# Patient Record
Sex: FEMALE | Race: BLACK OR AFRICAN AMERICAN | NOT HISPANIC OR LATINO | Employment: OTHER | URBAN - METROPOLITAN AREA
[De-identification: names, ages, dates, MRNs, and addresses within clinical notes are randomized per-mention and may not be internally consistent; named-entity substitution may affect disease eponyms.]

---

## 2017-03-25 ENCOUNTER — HOSPITAL ENCOUNTER (INPATIENT)
Facility: HOSPITAL | Age: 47
LOS: 3 days | Discharge: HOME | DRG: 191 | End: 2017-03-29
Attending: EMERGENCY MEDICINE | Admitting: INTERNAL MEDICINE
Payer: MEDICARE

## 2017-03-25 DIAGNOSIS — I10 ESSENTIAL (PRIMARY) HYPERTENSION: ICD-10-CM

## 2017-03-25 DIAGNOSIS — J45.901 ASTHMA EXACERBATION: ICD-10-CM

## 2017-03-25 DIAGNOSIS — R50.9 FEVER: Primary | ICD-10-CM

## 2017-03-25 DIAGNOSIS — J44.1 COPD EXACERBATION: ICD-10-CM

## 2017-03-25 DIAGNOSIS — J44.1 CHRONIC OBSTRUCTIVE PULMONARY DISEASE WITH ACUTE EXACERBATION: ICD-10-CM

## 2017-03-25 DIAGNOSIS — D86.9 SARCOIDOSIS: ICD-10-CM

## 2017-03-25 PROCEDURE — 96361 HYDRATE IV INFUSION ADD-ON: CPT

## 2017-03-25 PROCEDURE — 96374 THER/PROPH/DIAG INJ IV PUSH: CPT

## 2017-03-25 PROCEDURE — 99285 EMERGENCY DEPT VISIT HI MDM: CPT | Mod: 25

## 2017-03-25 NOTE — IP AVS SNAPSHOT
[facility]  [id number] [place] [address] Dr Deepak [place] [address]           Patient Discharge Instructions   Our goal is to set you up for success. This packet includes information on your condition, medications, and your home care.  It will help you care for yourself to prevent having to return to the hospital.     Please ask your nurse if you have any questions.      There are many details to remember when preparing to leave the hospital. Here is what you will need to do:    1. Take your medicine. If you are prescribed medications, review your Medication List on the following pages. You may have new medications to  at the pharmacy and others that you'll need to stop taking. Review the instructions for how and when to take your medications. Talk with your doctor or nurses if you are unsure of what to do.     2. Go to your follow-up appointments. Specific follow-up information is listed in the following pages. Your may be contacted by a nurse or clinical provider about future appointments. Be sure we have all of the phone numbers to reach you. Please contact your provider's office if you are unable to make an appointment.     3. Watch for warning signs. Your doctor or nurse will give you detailed warning signs to watch for and when to call for assistance. These instructions may also include educational information about your condition. If you experience any of warning signs to your health, call your doctor.               ** Verify the list of medication(s) below is accurate and up to date. Carry this with you in case of emergency. If your medications have changed, please notify your healthcare provider.             Medication List      START taking these medications        Additional Info                      albuterol-ipratropium 2.5mg-0.5mg/3mL 0.5 mg-3 mg(2.5 mg base)/3 mL nebulizer solution   Commonly known as:  DUO-NEB   Quantity:  24 vial   Refills:  1   Dose:  3 mL    Last time this was  given:  3 mLs on 3/29/2017  7:15 AM   Instructions:  Take 3 mLs by nebulization every 4 (four) hours as needed for Wheezing. Rescue     Begin Date    AM    Noon    PM    Bedtime       alprazolam 0.25 MG tablet   Commonly known as:  XANAX   Quantity:  90 tablet   Refills:  0   Dose:  0.25 mg    Last time this was given:  0.25 mg on 3/29/2017 10:46 AM   Instructions:  Take 1 tablet (0.25 mg total) by mouth daily as needed for Anxiety.     Begin Date    AM    Noon    PM    Bedtime       cetirizine 10 MG tablet   Commonly known as:  ZYRTEC   Quantity:  30 tablet   Refills:  1   Dose:  10 mg    Last time this was given:  10 mg on 3/29/2017 10:46 AM   Instructions:  Take 1 tablet (10 mg total) by mouth once daily.     Begin Date    AM    Noon    PM    Bedtime       predniSONE 10 MG tablet   Commonly known as:  DELTASONE   Quantity:  30 tablet   Refills:  0    Last time this was given:  40 mg on 3/29/2017  8:10 AM   Instructions:  40mg po daily x 4 days, then 30mg PO daily x 3 days, 20mg po daily x 2 daily, 10mg po daily x 1 days     Begin Date    AM    Noon    PM    Bedtime         CONTINUE taking these medications        Additional Info                      prednisoLONE acetate 1 % Drps   Commonly known as:  PRED FORTE   Refills:  0   Dose:  1 drop    Instructions:  1 drop 4 (four) times daily.     Begin Date    AM    Noon    PM    Bedtime            Where to Get Your Medications      You can get these medications from any pharmacy     Bring a paper prescription for each of these medications     albuterol-ipratropium 2.5mg-0.5mg/3mL 0.5 mg-3 mg(2.5 mg base)/3 mL nebulizer solution    alprazolam 0.25 MG tablet    cetirizine 10 MG tablet    predniSONE 10 MG tablet                  Please bring to all follow up appointments:    1. A copy of your discharge instructions.  2. All medicines you are currently taking in their original bottles.  3. Identification and insurance card.    Please arrive 15 minutes ahead of scheduled  "appointment time.    Please call 24 hours in advance if you must reschedule your appointment and/or time.        Your Scheduled Appointments     Apr 04, 2017  8:00 AM CDT   Consult with Roldan Kaplan MD   [facility]    [address] [facility]  [place] [address] [phone]              Follow-up Information     Follow up with Roldan Kaplan MD On 4/4/2017.    Specialty:  Pulmonary Disease    Contact information:    [address]  [facility] [address] [phone]          Discharge Instructions     Future Orders    Activity as tolerated     Diet general     Questions:    Total calories:      Fat restriction, if any:      Protein restriction, if any:      Na restriction, if any:      Fluid restriction:      Additional restrictions:      NEBULIZER FOR HOME USE     Questions:    Height:  5' 3" (1.6 m)    Weight:  114.8 kg (253 lb)    Does patient have medical equipment at home?:  cane, straight Comment - Cane designed for assistance of the visually impaired.     Length of need (1-99 months):  99      Discharge References/Attachments     ALPRAZOLAM TABLETS (ENGLISH)    PREDNISONE TABLETS (ENGLISH)    CETIRIZINE TABLETS (ENGLISH)        Primary Diagnosis     Your primary diagnosis was:  Asthma Flare      Admission Information     Date & Time Provider Department [facility]    3/25/2017 11:47 PM Curry Lewis MD [facility] [id number]      Care Providers     Provider Role Specialty Primary office phone    Curry Lewis MD Attending Provider Internal Medicine [phone]    Curry Lewis MD Team Attending  Internal Medicine [phone]      Your Vitals Were     BP Pulse Temp Resp Height Weight    152/85 (BP Location: Left arm, Patient Position: Lying, BP Method: Automatic) 75 98.7 °F (37.1 °C) (Oral) 18 5' 3" (1.6 m) 114.8 kg (253 lb)    SpO2 BMI             94% 44.82 kg/m2         Recent Lab Values     No lab values to display.      Allergies as of " 3/29/2017     No Known Allergies      [facility]     [facility] Nurse Care Line - 24/7 Assistance  Unless otherwise directed by your provider, please contact [facility], our nurse care line that is available for 24/7 assistance.     Registered nurses in the [facility] provide clinical advisement, health education, appointment booking, and other advisory services.  Call for this free service at [phone].        Advance Directives     An advance directive is a document which, in the event you are no longer able to make decisions for yourself, tells your healthcare team what kind of treatment you do or do not want to receive, or who you would like to make those decisions for you.  If you do not currently have an advance directive, [facility] encourages you to create one.  For more information call:  [phone]), [phone] ([phone]),  or log on to [email or web address].        Language Assistance Services     ATTENTION: Language assistance services are available, free of charge. Please call [phone].      ATENCIÓN: Si habla español, tiene a smith disposición servicios gratuitos de asistencia lingüística. Llame al [phone].     CHÚ Ý: N?u b?n nói Ti?ng Vi?t, có các d?ch v? h? tr? ngôn ng? mi?n phí dành cho b?n. G?i s? [phone].        [facility] Sign-Up     Activating your [facility] account is as easy as 1-2-3!     1) Visit [email or web address], select Sign Up Now, enter this activation code and your date of birth, then select Next.  [email or web address]  Expires: 5/13/2017  1:13 PM      2) Create a username and password to use when you visit [facility] in the future and select a security question in case you lose your password and select Next.    3) Enter your e-mail address and click Sign Up!    Additional Information  If you have questions, please e-mail [email or web address] or call [phone] to talk to our [facility] staff. Remember, [facility] is NOT to  be used for urgent needs. For medical emergencies, dial 911.          [facility] complies with applicable Federal civil rights laws and does not discriminate on the basis of race, color, national origin, age, disability, or sex.

## 2017-03-26 PROBLEM — J45.901 ASTHMA EXACERBATION: Status: ACTIVE | Noted: 2017-03-26

## 2017-03-26 PROBLEM — E66.01 MORBID OBESITY: Status: ACTIVE | Noted: 2017-03-26

## 2017-03-26 PROBLEM — J44.1 COPD EXACERBATION: Status: ACTIVE | Noted: 2017-03-26

## 2017-03-26 PROBLEM — D86.9 SARCOIDOSIS: Status: ACTIVE | Noted: 2017-03-26

## 2017-03-26 LAB
ALBUMIN SERPL BCP-MCNC: 3.6 G/DL
ALLENS TEST: ABNORMAL
ALP SERPL-CCNC: 64 U/L
ALT SERPL W/O P-5'-P-CCNC: 22 U/L
ANION GAP SERPL CALC-SCNC: 10 MMOL/L
AST SERPL-CCNC: 26 U/L
BASOPHILS # BLD AUTO: 0.02 K/UL
BASOPHILS NFR BLD: 0.4 %
BILIRUB SERPL-MCNC: 0.5 MG/DL
BUN SERPL-MCNC: 5 MG/DL
CALCIUM SERPL-MCNC: 9.3 MG/DL
CHLORIDE SERPL-SCNC: 105 MMOL/L
CO2 SERPL-SCNC: 26 MMOL/L
CREAT SERPL-MCNC: 0.9 MG/DL
DELSYS: ABNORMAL
DIASTOLIC DYSFUNCTION: YES
DIFFERENTIAL METHOD: ABNORMAL
EOSINOPHIL # BLD AUTO: 0.4 K/UL
EOSINOPHIL NFR BLD: 6.4 %
ERYTHROCYTE [DISTWIDTH] IN BLOOD BY AUTOMATED COUNT: 15.3 %
EST. GFR  (AFRICAN AMERICAN): >60 ML/MIN/1.73 M^2
EST. GFR  (NON AFRICAN AMERICAN): >60 ML/MIN/1.73 M^2
ESTIMATED PA SYSTOLIC PRESSURE: 24.34
FIO2: 21
FLUAV AG SPEC QL IA: NEGATIVE
FLUBV AG SPEC QL IA: NEGATIVE
GLUCOSE SERPL-MCNC: 99 MG/DL
HCO3 UR-SCNC: 24.5 MMOL/L (ref 24–28)
HCT VFR BLD AUTO: 33.9 %
HGB BLD-MCNC: 11.3 G/DL
LYMPHOCYTES # BLD AUTO: 1.1 K/UL
LYMPHOCYTES NFR BLD: 19.9 %
MCH RBC QN AUTO: 28.4 PG
MCHC RBC AUTO-ENTMCNC: 33.3 %
MCV RBC AUTO: 85 FL
MODE: ABNORMAL
MONOCYTES # BLD AUTO: 0.4 K/UL
MONOCYTES NFR BLD: 8.1 %
NEUTROPHILS # BLD AUTO: 3.6 K/UL
NEUTROPHILS NFR BLD: 65.4 %
PCO2 BLDA: 37.3 MMHG (ref 35–45)
PH SMN: 7.42 [PH] (ref 7.35–7.45)
PLATELET # BLD AUTO: 108 K/UL
PLATELET BLD QL SMEAR: ABNORMAL
PMV BLD AUTO: ABNORMAL FL
PO2 BLDA: 78 MMHG (ref 80–100)
POC BE: 0 MMOL/L
POC SATURATED O2: 96 % (ref 95–100)
POTASSIUM SERPL-SCNC: 4 MMOL/L
PROT SERPL-MCNC: 7.3 G/DL
RBC # BLD AUTO: 3.98 M/UL
RETIRED EF AND QEF - SEE NOTES: 65 (ref 55–65)
SAMPLE: ABNORMAL
SITE: ABNORMAL
SODIUM SERPL-SCNC: 141 MMOL/L
SPECIMEN SOURCE: NORMAL
WBC # BLD AUTO: 5.44 K/UL

## 2017-03-26 PROCEDURE — 25000242 PHARM REV CODE 250 ALT 637 W/ HCPCS: Performed by: INTERNAL MEDICINE

## 2017-03-26 PROCEDURE — 63600175 PHARM REV CODE 636 W HCPCS: Performed by: EMERGENCY MEDICINE

## 2017-03-26 PROCEDURE — 93306 TTE W/DOPPLER COMPLETE: CPT

## 2017-03-26 PROCEDURE — 25000003 PHARM REV CODE 250: Performed by: INTERNAL MEDICINE

## 2017-03-26 PROCEDURE — 80053 COMPREHEN METABOLIC PANEL: CPT

## 2017-03-26 PROCEDURE — 94640 AIRWAY INHALATION TREATMENT: CPT

## 2017-03-26 PROCEDURE — 85025 COMPLETE CBC W/AUTO DIFF WBC: CPT

## 2017-03-26 PROCEDURE — 93306 TTE W/DOPPLER COMPLETE: CPT | Mod: 26,,, | Performed by: INTERNAL MEDICINE

## 2017-03-26 PROCEDURE — 82803 BLOOD GASES ANY COMBINATION: CPT

## 2017-03-26 PROCEDURE — 99900035 HC TECH TIME PER 15 MIN (STAT)

## 2017-03-26 PROCEDURE — 36600 WITHDRAWAL OF ARTERIAL BLOOD: CPT

## 2017-03-26 PROCEDURE — 25000242 PHARM REV CODE 250 ALT 637 W/ HCPCS: Performed by: EMERGENCY MEDICINE

## 2017-03-26 PROCEDURE — 96372 THER/PROPH/DIAG INJ SC/IM: CPT

## 2017-03-26 PROCEDURE — 63600175 PHARM REV CODE 636 W HCPCS: Performed by: INTERNAL MEDICINE

## 2017-03-26 PROCEDURE — 21400001 HC TELEMETRY ROOM

## 2017-03-26 PROCEDURE — 87400 INFLUENZA A/B EACH AG IA: CPT

## 2017-03-26 PROCEDURE — 25000003 PHARM REV CODE 250: Performed by: EMERGENCY MEDICINE

## 2017-03-26 RX ORDER — IPRATROPIUM BROMIDE AND ALBUTEROL SULFATE 2.5; .5 MG/3ML; MG/3ML
3 SOLUTION RESPIRATORY (INHALATION) EVERY 4 HOURS PRN
Status: DISCONTINUED | OUTPATIENT
Start: 2017-03-26 | End: 2017-03-26

## 2017-03-26 RX ORDER — ENOXAPARIN SODIUM 100 MG/ML
40 INJECTION SUBCUTANEOUS EVERY 24 HOURS
Status: DISCONTINUED | OUTPATIENT
Start: 2017-03-26 | End: 2017-03-29 | Stop reason: HOSPADM

## 2017-03-26 RX ORDER — FAMOTIDINE 20 MG/1
20 TABLET, FILM COATED ORAL 2 TIMES DAILY
Status: DISCONTINUED | OUTPATIENT
Start: 2017-03-26 | End: 2017-03-29 | Stop reason: HOSPADM

## 2017-03-26 RX ORDER — OXYCODONE AND ACETAMINOPHEN 7.5; 325 MG/1; MG/1
1 TABLET ORAL EVERY 4 HOURS PRN
Status: DISCONTINUED | OUTPATIENT
Start: 2017-03-26 | End: 2017-03-29 | Stop reason: HOSPADM

## 2017-03-26 RX ORDER — IPRATROPIUM BROMIDE AND ALBUTEROL SULFATE 2.5; .5 MG/3ML; MG/3ML
3 SOLUTION RESPIRATORY (INHALATION) EVERY 6 HOURS
Status: COMPLETED | OUTPATIENT
Start: 2017-03-26 | End: 2017-03-28

## 2017-03-26 RX ORDER — DIPHENHYDRAMINE HCL 25 MG
25 CAPSULE ORAL EVERY 6 HOURS PRN
Status: DISCONTINUED | OUTPATIENT
Start: 2017-03-26 | End: 2017-03-29 | Stop reason: HOSPADM

## 2017-03-26 RX ORDER — HYDRALAZINE HYDROCHLORIDE 20 MG/ML
10 INJECTION INTRAMUSCULAR; INTRAVENOUS EVERY 6 HOURS PRN
Status: DISCONTINUED | OUTPATIENT
Start: 2017-03-26 | End: 2017-03-29 | Stop reason: HOSPADM

## 2017-03-26 RX ORDER — ACETAMINOPHEN 325 MG/1
650 TABLET ORAL EVERY 6 HOURS PRN
Status: DISCONTINUED | OUTPATIENT
Start: 2017-03-26 | End: 2017-03-29 | Stop reason: HOSPADM

## 2017-03-26 RX ORDER — GUAIFENESIN 100 MG/5ML
200 SOLUTION ORAL EVERY 4 HOURS PRN
Status: DISCONTINUED | OUTPATIENT
Start: 2017-03-26 | End: 2017-03-29 | Stop reason: HOSPADM

## 2017-03-26 RX ORDER — MOXIFLOXACIN HYDROCHLORIDE 400 MG/1
400 TABLET ORAL DAILY
Status: DISCONTINUED | OUTPATIENT
Start: 2017-03-26 | End: 2017-03-29 | Stop reason: HOSPADM

## 2017-03-26 RX ORDER — ONDANSETRON 2 MG/ML
4 INJECTION INTRAMUSCULAR; INTRAVENOUS EVERY 8 HOURS PRN
Status: DISCONTINUED | OUTPATIENT
Start: 2017-03-26 | End: 2017-03-29 | Stop reason: HOSPADM

## 2017-03-26 RX ORDER — PREDNISONE 20 MG/1
40 TABLET ORAL DAILY
Status: DISCONTINUED | OUTPATIENT
Start: 2017-03-26 | End: 2017-03-27

## 2017-03-26 RX ORDER — METHYLPREDNISOLONE SOD SUCC 125 MG
VIAL (EA) INJECTION
Status: DISCONTINUED
Start: 2017-03-26 | End: 2017-03-26 | Stop reason: WASHOUT

## 2017-03-26 RX ORDER — FUROSEMIDE 40 MG/1
40 TABLET ORAL ONCE
Status: COMPLETED | OUTPATIENT
Start: 2017-03-26 | End: 2017-03-26

## 2017-03-26 RX ORDER — IPRATROPIUM BROMIDE AND ALBUTEROL SULFATE 2.5; .5 MG/3ML; MG/3ML
3 SOLUTION RESPIRATORY (INHALATION)
Status: COMPLETED | OUTPATIENT
Start: 2017-03-26 | End: 2017-03-26

## 2017-03-26 RX ORDER — PREDNISOLONE ACETATE 10 MG/ML
1 SUSPENSION/ DROPS OPHTHALMIC 4 TIMES DAILY
COMMUNITY
End: 2017-07-03 | Stop reason: SDUPTHER

## 2017-03-26 RX ORDER — IPRATROPIUM BROMIDE AND ALBUTEROL SULFATE 2.5; .5 MG/3ML; MG/3ML
3 SOLUTION RESPIRATORY (INHALATION) EVERY 6 HOURS
Status: DISCONTINUED | OUTPATIENT
Start: 2017-03-26 | End: 2017-03-26

## 2017-03-26 RX ADMIN — IPRATROPIUM BROMIDE AND ALBUTEROL SULFATE 3 ML: .5; 3 SOLUTION RESPIRATORY (INHALATION) at 12:03

## 2017-03-26 RX ADMIN — FUROSEMIDE 40 MG: 40 TABLET ORAL at 05:03

## 2017-03-26 RX ADMIN — FAMOTIDINE 20 MG: 20 TABLET ORAL at 09:03

## 2017-03-26 RX ADMIN — SODIUM CHLORIDE 1000 ML: 0.9 INJECTION, SOLUTION INTRAVENOUS at 01:03

## 2017-03-26 RX ADMIN — METHYLPREDNISOLONE SODIUM SUCCINATE 125 MG: 125 INJECTION, POWDER, FOR SOLUTION INTRAMUSCULAR; INTRAVENOUS at 04:03

## 2017-03-26 RX ADMIN — IPRATROPIUM BROMIDE AND ALBUTEROL SULFATE 3 ML: .5; 3 SOLUTION RESPIRATORY (INHALATION) at 07:03

## 2017-03-26 RX ADMIN — PREDNISONE 40 MG: 20 TABLET ORAL at 09:03

## 2017-03-26 RX ADMIN — MOXIFLOXACIN HYDROCHLORIDE 400 MG: 400 TABLET, FILM COATED ORAL at 05:03

## 2017-03-26 RX ADMIN — IPRATROPIUM BROMIDE AND ALBUTEROL SULFATE 3 ML: .5; 3 SOLUTION RESPIRATORY (INHALATION) at 01:03

## 2017-03-26 RX ADMIN — ENOXAPARIN SODIUM 40 MG: 100 INJECTION SUBCUTANEOUS at 05:03

## 2017-03-26 RX ADMIN — IPRATROPIUM BROMIDE AND ALBUTEROL SULFATE 3 ML: .5; 3 SOLUTION RESPIRATORY (INHALATION) at 06:03

## 2017-03-26 NOTE — ED NOTES
Pt resting in bed. No acute distress, RR equal and non labored, VSS.  Breathing has improved. Bed in low, locked, position and call light is within reach. Side rails up X 2. Will continue to monitor.

## 2017-03-26 NOTE — PLAN OF CARE
Problem: Patient Care Overview  Goal: Plan of Care Review  Outcome: Ongoing (interventions implemented as appropriate)  Patient arrived early am from er.  Plan of care reviewed with patient, reviewed rounding and fall precautions with patient.  Patient verbalized understanding. Bed alarm activiated, call light given to patient. No further needs at this time

## 2017-03-26 NOTE — ED PROVIDER NOTES
SCRIBE #1 NOTE: I, Kim Rouse, am scribing for, and in the presence of, Otf Nguyen MD. I have scribed the entire note.      History      Chief Complaint   Patient presents with    Cough     URI ssx x 3 days    Fever       Review of patient's allergies indicates:  No Known Allergies     HPI   HPI    3/26/2017, 12:50 AM   History obtained from the patient      History of Present Illness: Jennifer Mcclain is a 46 y.o. female patient who presents to the Emergency Department for cough which onset gradually 3 days ago. Pt also c/o SOB noting a history of COPD and sarcoidosis.  Symptoms are constant and moderate in severity. No mitigating or exacerbating factors reported. Associated sxs include myalgias, chills, fever, and sinus prerssure. Patient denies any ear pressure, ear pain, CP, diaphoresis, weakness, leg swelling, palpitations, HA, jaw pain, recent travel, long car rides, nausea, vomiting, abdominal pain, and all other sxs at this time. No further complaints or concerns at this time.         Arrival mode: Personal vehicle    PCP: Primary Doctor No       Past Medical History:  Past medical history reviewed not relevant      Past Surgical History:  Past surgical history reviewed not relevant      Family History:  Family history reviewed not relevant      Social History:  Social History    Social History Main Topics    Social History Main Topics    Smoking status: Unknown if ever smoked    Smokeless tobacco: Unknown if ever used    Alcohol Use: Unknown drinking history    Drug Use: Unknown if ever used    Sexual Activity: Unknown       ROS   Review of Systems   Constitutional: Positive for chills and fever. Negative for diaphoresis.   HENT: Positive for congestion and sinus pressure. Negative for ear discharge, ear pain and sore throat.    Respiratory: Positive for cough, shortness of breath and wheezing. Negative for chest tightness.    Cardiovascular: Negative for chest pain, palpitations and leg  "swelling.   Gastrointestinal: Negative for abdominal pain, diarrhea, nausea and vomiting.   Genitourinary: Negative for dysuria.   Musculoskeletal: Positive for myalgias. Negative for back pain.   Skin: Negative for rash.   Neurological: Negative for dizziness, weakness, light-headedness and headaches.   Hematological: Does not bruise/bleed easily.       Physical Exam    Initial Vitals   BP Pulse Resp Temp SpO2   03/25/17 2346 03/25/17 2346 03/25/17 2346 03/25/17 2346 03/25/17 2346   153/67 111 20 100.3 °F (37.9 °C) 95 %      Physical Exam  Nursing Notes and Vital Signs Reviewed.  Constitutional: Patient is in mild distress. Awake and alert. Well-developed and well-nourished.  Head: Atraumatic. Normocephalic.  Eyes: PERRL. EOM intact. Conjunctivae are not pale. No scleral icterus.  ENT: Mucous membranes are moist. Oropharynx is clear and symmetric.    Neck: Supple. Full ROM. No lymphadenopathy.  Cardiovascular: Regular rate. Regular rhythm. No murmurs, rubs, or gallops. Distal pulses are 2+ and symmetric.  Pulmonary/Chest: Mild-moderate dyspnea. Tachypnea. Accessory muscle use noted. Diffuse wheezing bilaterally.  Abdominal: Soft and non-distended.  There is no tenderness.  No rebound, guarding, or rigidity. Good bowel sounds.  Genitourinary: No CVA tenderness  Musculoskeletal: Moves all extremities. No obvious deformities. No edema. No calf tenderness.  Skin: Warm and dry.  Neurological:  Alert, awake, and appropriate.  Normal speech.  No acute focal neurological deficits are appreciated.  Psychiatric: Normal affect. Good eye contact. Appropriate in content.    ED Course    Procedures  ED Vital Signs:  Vitals:    03/25/17 2346 03/26/17 0044 03/26/17 0049 03/26/17 0110   BP: (!) 153/67      Pulse: (!) 111 101 102 106   Resp: 20 (!) 22 20 20   Temp: 100.3 °F (37.9 °C)      TempSrc: Oral      SpO2: 95% 95% 96% 96%   Weight: 116.1 kg (256 lb)      Height: 5' 3" (1.6 m)       03/26/17 0202 03/26/17 0206   BP: (!) 118/50 "    Pulse: 107 107   Resp: (!) 21 19   Temp: 99.5 °F (37.5 °C)    TempSrc: Oral    SpO2: 100% 100%   Weight:     Height:         Abnormal Lab Results:  Labs Reviewed   CBC W/ AUTO DIFFERENTIAL - Abnormal; Notable for the following:        Result Value    RBC 3.98 (*)     Hemoglobin 11.3 (*)     Hematocrit 33.9 (*)     RDW 15.3 (*)     Platelets 108 (*)     Platelet Estimate Clumped (*)     All other components within normal limits   COMPREHENSIVE METABOLIC PANEL - Abnormal; Notable for the following:     BUN, Bld 5 (*)     All other components within normal limits   INFLUENZA A AND B ANTIGEN        All Lab Results:  Results for orders placed or performed during the hospital encounter of 03/25/17   CBC auto differential   Result Value Ref Range    WBC 5.44 3.90 - 12.70 K/uL    RBC 3.98 (L) 4.00 - 5.40 M/uL    Hemoglobin 11.3 (L) 12.0 - 16.0 g/dL    Hematocrit 33.9 (L) 37.0 - 48.5 %    MCV 85 82 - 98 fL    MCH 28.4 27.0 - 31.0 pg    MCHC 33.3 32.0 - 36.0 %    RDW 15.3 (H) 11.5 - 14.5 %    Platelets 108 (L) 150 - 350 K/uL    MPV SEE COMMENT 9.2 - 12.9 fL    Gran # 3.6 1.8 - 7.7 K/uL    Lymph # 1.1 1.0 - 4.8 K/uL    Mono # 0.4 0.3 - 1.0 K/uL    Eos # 0.4 0.0 - 0.5 K/uL    Baso # 0.02 0.00 - 0.20 K/uL    Gran% 65.4 38.0 - 73.0 %    Lymph% 19.9 18.0 - 48.0 %    Mono% 8.1 4.0 - 15.0 %    Eosinophil% 6.4 0.0 - 8.0 %    Basophil% 0.4 0.0 - 1.9 %    Platelet Estimate Clumped (A)     Differential Method Automated    Comprehensive metabolic panel   Result Value Ref Range    Sodium 141 136 - 145 mmol/L    Potassium 4.0 3.5 - 5.1 mmol/L    Chloride 105 95 - 110 mmol/L    CO2 26 23 - 29 mmol/L    Glucose 99 70 - 110 mg/dL    BUN, Bld 5 (L) 6 - 20 mg/dL    Creatinine 0.9 0.5 - 1.4 mg/dL    Calcium 9.3 8.7 - 10.5 mg/dL    Total Protein 7.3 6.0 - 8.4 g/dL    Albumin 3.6 3.5 - 5.2 g/dL    Total Bilirubin 0.5 0.1 - 1.0 mg/dL    Alkaline Phosphatase 64 55 - 135 U/L    AST 26 10 - 40 U/L    ALT 22 10 - 44 U/L    Anion Gap 10 8 - 16 mmol/L     eGFR if African American >60 >60 mL/min/1.73 m^2    eGFR if non African American >60 >60 mL/min/1.73 m^2   Influenza antigen Nasopharyngeal Swab   Result Value Ref Range    Influenza A Ag, EIA Negative Negative    Influenza B Ag, EIA Negative Negative    Flu A & B Source Nasopharyngeal Swab          Imaging Results:  Imaging Results         X-Ray Chest PA And Lateral (In process)                   The Emergency Provider reviewed the vital signs and test results, which are outlined above.    ED Discussion   All historical, clinical, radiographic, and laboratory findings were reviewed with the patient/family in detail along with the indications for transport to the facility in [place] in order to receive further bronchodilator treatments and steroids pending improvement.  All remaining questions and concerns were addressed at this time and the patient/family agrees to proceed accordingly.  Similarly, all pertinent details of the encounter were discussed with Dr. Vanegas who agrees to accept the patient at [facility] for further care as outlined above.    Otf Nguyen MD      3:43 AM: Discussed case with Dr. Vanegas ([facility]) who agrees with current care and management of pt and accepts admission.   Admitting Service: [facility] medicine   Admitting Physician: Dr. Vanegas   Admit to: OBS-Tele    3:43 AM: Re-evaluated pt. I have discussed test results, shared treatment plan, and the need for admission with patient and family at bedside. Pt and family express understanding at this time and agree with all information. All questions answered. Pt and family have no further questions or concerns at this time. Pt is ready for admit.      ED Medication(s):  Medications   methylPREDNISolone sod suc(PF) 125 mg/2 mL injection 125 mg (not administered)   albuterol-ipratropium 2.5mg-0.5mg/3mL nebulizer solution 3 mL (3 mLs Nebulization Given 3/26/17 0110)   albuterol-ipratropium 2.5mg-0.5mg/3mL nebulizer  solution 3 mL (3 mLs Nebulization Given 3/26/17 0049)   albuterol-ipratropium 2.5mg-0.5mg/3mL nebulizer solution 3 mL (3 mLs Nebulization Given 3/26/17 0044)   sodium chloride 0.9% bolus 1,000 mL (0 mLs Intravenous Stopped 3/26/17 0211)       New Prescriptions    No medications on file             Medical Decision Making    Medical Decision Making:   Clinical Tests:   Lab Tests: Ordered and Reviewed  Radiological Study: Ordered and Reviewed  Medical Tests: Ordered and Reviewed           Scribe Attestation:   Scribe #1: I performed the above scribed service and the documentation accurately describes the services I performed. I attest to the accuracy of the note.    Attending:   Physician Attestation Statement for Scribe #1: I, Otf Nguyen MD, personally performed the services described in this documentation, as scribed by Kim Rouse, in my presence, and it is both accurate and complete.          Clinical Impression       ICD-10-CM ICD-9-CM   1. Fever R50.9 780.60   2. Sarcoidosis D86.9 135   3. Chronic obstructive pulmonary disease with acute exacerbation J44.1 491.21   4. COPD exacerbation J44.1 491.21       Disposition:   Disposition: Placed in Observation  Condition: Fair         Otf Nguyen MD  03/26/17 5034

## 2017-03-26 NOTE — ED NOTES
Two unsuccessful attempts were made to place an IV.  JEREMIAH Rollins at bedside to attempt IV access at this time.

## 2017-03-26 NOTE — H&P
[facility] Hospital Medicine  History & Physical    Patient Name: Jennifer Mcclain  MRN: [id number]  Admission Date: 3/25/2017  Attending Physician: Mitchell Vanegas MD  Primary Care Provider: Primary Doctor No         Patient information was obtained from patient and ER records.     Subjective:     Principal Problem:COPD exacerbation    Chief Complaint:   Chief Complaint   Patient presents with    Cough     URI ssx x 3 days    Fever        HPI: Ms. Mcclain is a 47 y/o AAF with h/o sarcoidosis followed by  (pulmonalogist), was taken off steroids one year ago, was in her usual state of health until 2 days, presented to the ED c/o worsening cough, and DENNSI without fever/chills. Reports mild wheezing. In the ED, oxygen saturations are high 90's, she received 3 rounds of nebulized treatments, with still being dyspneic and tachypneic. Se is admitted for mild acute COPD exacerbation. Patient has been a non-smoker all her life. CXR shows no evidence of infiltrates, but shows interstitial scarring.    Past Medical History:   Diagnosis Date    Asthma     Blindness     Sarcoidosis        History reviewed. No pertinent surgical history.    Review of patient's allergies indicates:  No Known Allergies    No current facility-administered medications on file prior to encounter.      No current outpatient prescriptions on file prior to encounter.     Family History     Problem Relation (Age of Onset)    Heart disease Father        Social History Main Topics    Smoking status: Never Smoker    Smokeless tobacco: Not on file    Alcohol use No    Drug use: No    Sexual activity: Not on file     Review of Systems   Constitutional: Negative.  Negative for chills, diaphoresis, fatigue and fever.   HENT: Negative.  Negative for congestion, nosebleeds and sinus pressure.    Eyes: Negative.  Negative for photophobia, redness and visual disturbance.   Respiratory: Positive for cough, shortness of breath and  wheezing. Negative for chest tightness.    Cardiovascular: Positive for leg swelling (chronic). Negative for chest pain and palpitations.   Gastrointestinal: Negative.  Negative for abdominal pain, diarrhea, nausea and vomiting.   Endocrine: Negative.  Negative for polydipsia, polyphagia and polyuria.   Genitourinary: Negative.  Negative for dysuria, flank pain, frequency and urgency.   Musculoskeletal: Negative.  Negative for back pain, joint swelling and neck stiffness.   Skin: Negative.  Negative for color change, pallor and rash.   Allergic/Immunologic: Negative.  Negative for environmental allergies, food allergies and immunocompromised state.   Neurological: Negative.  Negative for dizziness, syncope, speech difficulty, numbness and headaches.   Hematological: Negative.  Negative for adenopathy. Does not bruise/bleed easily.   Psychiatric/Behavioral: Negative.  Negative for confusion, decreased concentration and hallucinations. The patient is not nervous/anxious.    All other systems reviewed and are negative.    Objective:     Vital Signs (Most Recent):  Temp: 99.6 °F (37.6 °C) (03/26/17 0434)  Pulse: 102 (03/26/17 0434)  Resp: (!) 22 (03/26/17 0434)  BP: (!) 117/54 (03/26/17 0434)  SpO2: 98 % (03/26/17 0434) Vital Signs (24h Range):  Temp:  [99.5 °F (37.5 °C)-100.3 °F (37.9 °C)] 99.6 °F (37.6 °C)  Pulse:  [101-111] 102  Resp:  [19-22] 22  SpO2:  [95 %-100 %] 98 %  BP: (117-153)/(50-67) 117/54     Weight: 116.1 kg (256 lb)  Body mass index is 45.35 kg/(m^2).    Physical Exam   Constitutional: She is oriented to person, place, and time. She appears well-developed and well-nourished. No distress.   HENT:   Head: Normocephalic and atraumatic.   Blind in both eyes   Eyes: Conjunctivae and EOM are normal. No scleral icterus.   Neck: Normal range of motion. Neck supple. No JVD present. No tracheal deviation present. No thyromegaly present.   Cardiovascular: Normal rate, regular rhythm, normal heart sounds and  intact distal pulses.    No murmur heard.  Pulmonary/Chest: Effort normal. No respiratory distress. She has wheezes (mild). She has no rales. She exhibits no tenderness.   Abdominal: Soft. Bowel sounds are normal. She exhibits no distension. There is no tenderness.   Musculoskeletal: Normal range of motion. She exhibits edema. She exhibits no tenderness.   Lymphadenopathy:     She has no cervical adenopathy.   Neurological: She is alert and oriented to person, place, and time. No cranial nerve deficit. She exhibits normal muscle tone. Coordination normal.   Skin: Skin is warm and dry. She is not diaphoretic. No erythema.   Psychiatric: She has a normal mood and affect. Her behavior is normal. Thought content normal.   Nursing note and vitals reviewed.       Significant Labs:   ABGs:   Recent Labs  Lab 03/26/17  0357   PH 7.425   PCO2 37.3   HCO3 24.5   POCSATURATED 96   BE 0     BMP:   Recent Labs  Lab 03/26/17  0102   GLU 99      K 4.0      CO2 26   BUN 5*   CREATININE 0.9   CALCIUM 9.3     CBC:   Recent Labs  Lab 03/26/17 0102   WBC 5.44   HGB 11.3*   HCT 33.9*   *     CMP:   Recent Labs  Lab 03/26/17  0102      K 4.0      CO2 26   GLU 99   BUN 5*   CREATININE 0.9   CALCIUM 9.3   PROT 7.3   ALBUMIN 3.6   BILITOT 0.5   ALKPHOS 64   AST 26   ALT 22   ANIONGAP 10   EGFRNONAA >60     Cardiac Markers: No results for input(s): CKMB, MYOGLOBIN, BNP, TROPISTAT in the last 48 hours.  Respiratory Culture: No results for input(s): GSRESP, RESPIRATORYC in the last 48 hours.  Troponin: No results for input(s): TROPONINI in the last 48 hours.  Urine Studies: No results for input(s): COLORU, APPEARANCEUA, PHUR, SPECGRAV, PROTEINUA, GLUCUA, KETONESU, BILIRUBINUA, OCCULTUA, NITRITE, UROBILINOGEN, LEUKOCYTESUR, RBCUA, WBCUA, BACTERIA, SQUAMEPITHEL, HYALINECASTS in the last 48 hours.    Invalid input(s): WRIGHTSUR  All pertinent labs within the past 24 hours have been reviewed.    Significant Imaging:    Imaging Results         X-Ray Chest PA And Lateral (In process)             Assessment/Plan:     * COPD exacerbation  - Improved with one dose of IV solumedrol  - Contiue oral prednisone  - Duonebs q6 scheduled  - Empirically avelox      Sarcoidosis  - Defer to her pulmonologist as out-patient      Morbid obesity  - BMI 45.3      VTE Risk Mitigation     Lovenox        Mitchell Vanegas MD  Department of Hospital Medicine   [facility]

## 2017-03-26 NOTE — ED NOTES
Charge nurse on observation states she will call when they are ready to receive the patient due to high patient acuity on the floor at this time.    Pt is asleep in bed. No acute distress is noted, RR equal and non labored, VSS. Bed in low, locked, position and call light is within reach. Side rails up X 2.  Patient denies any further needs. Will continue to monitor.

## 2017-03-26 NOTE — PROGRESS NOTES
Patient transferred via w/c per Elaine DANIELS to inpatient medsur room 571, report called to Sugey DANIELS

## 2017-03-26 NOTE — PLAN OF CARE
Problem: Patient Care Overview  Goal: Plan of Care Review  Outcome: Ongoing (interventions implemented as appropriate)  Plan of care reviewed, updated and ongoing

## 2017-03-26 NOTE — ASSESSMENT & PLAN NOTE
- Improved with one dose of IV solumedrol  - Contiue oral prednisone  - Duonebs q6 scheduled  - Empirically avelox

## 2017-03-26 NOTE — PROGRESS NOTES
"Jennifer Mcclain is a 46 year old female with history of Asthma and Sarcoidosis admitted with URI and Asthma Exacerbation. Patient seen and examined today. She admits to 3 days history of sinus/chest congestion, cough, and body aches. Unsure or fever. She has not used her "rescue inhaler" in 4 months. Expiratory Wheezing noted to ausculation. CT Chest negative. Asthma Exacerbation likely triggered from URI. Low grade temp, tachycardia, and tachypnea noted in ED. Given above, will transition to Inpatient. Continue bronchodilators and systemic glucocorticoids. Patient will need to establish PCP and Pulmonology for further staging of Asthma and maintenance. Patient was counseled on controlling and identifying comorbidities that may contribute to flare up such as GEN, Obesity, URI, and sinusitis. Patient verbalized understanding.   "

## 2017-03-26 NOTE — PLAN OF CARE
Problem: Patient Care Overview  Goal: Plan of Care Review  Outcome: Ongoing (interventions implemented as appropriate)  Pt remains free from fall this shift bed in lowest position call light in easy reach bed lock and bed alarm on. Pt instructed to call for assistance while ambulating due to pt being blind pt verbalized understanding. Denies pain this shift. Family at side. 12 hr chart check completed.

## 2017-03-26 NOTE — SUBJECTIVE & OBJECTIVE
Past Medical History:   Diagnosis Date    Asthma     Blindness     Sarcoidosis        History reviewed. No pertinent surgical history.    Review of patient's allergies indicates:  No Known Allergies    No current facility-administered medications on file prior to encounter.      No current outpatient prescriptions on file prior to encounter.     Family History     Problem Relation (Age of Onset)    Heart disease Father        Social History Main Topics    Smoking status: Never Smoker    Smokeless tobacco: Not on file    Alcohol use No    Drug use: No    Sexual activity: Not on file     Review of Systems   Constitutional: Negative.  Negative for chills, diaphoresis, fatigue and fever.   HENT: Negative.  Negative for congestion, nosebleeds and sinus pressure.    Eyes: Negative.  Negative for photophobia, redness and visual disturbance.   Respiratory: Positive for cough, shortness of breath and wheezing. Negative for chest tightness.    Cardiovascular: Positive for leg swelling (chronic). Negative for chest pain and palpitations.   Gastrointestinal: Negative.  Negative for abdominal pain, diarrhea, nausea and vomiting.   Endocrine: Negative.  Negative for polydipsia, polyphagia and polyuria.   Genitourinary: Negative.  Negative for dysuria, flank pain, frequency and urgency.   Musculoskeletal: Negative.  Negative for back pain, joint swelling and neck stiffness.   Skin: Negative.  Negative for color change, pallor and rash.   Allergic/Immunologic: Negative.  Negative for environmental allergies, food allergies and immunocompromised state.   Neurological: Negative.  Negative for dizziness, syncope, speech difficulty, numbness and headaches.   Hematological: Negative.  Negative for adenopathy. Does not bruise/bleed easily.   Psychiatric/Behavioral: Negative.  Negative for confusion, decreased concentration and hallucinations. The patient is not nervous/anxious.    All other systems reviewed and are  negative.    Objective:     Vital Signs (Most Recent):  Temp: 99.6 °F (37.6 °C) (03/26/17 0434)  Pulse: 102 (03/26/17 0434)  Resp: (!) 22 (03/26/17 0434)  BP: (!) 117/54 (03/26/17 0434)  SpO2: 98 % (03/26/17 0434) Vital Signs (24h Range):  Temp:  [99.5 °F (37.5 °C)-100.3 °F (37.9 °C)] 99.6 °F (37.6 °C)  Pulse:  [101-111] 102  Resp:  [19-22] 22  SpO2:  [95 %-100 %] 98 %  BP: (117-153)/(50-67) 117/54     Weight: 116.1 kg (256 lb)  Body mass index is 45.35 kg/(m^2).    Physical Exam   Constitutional: She is oriented to person, place, and time. She appears well-developed and well-nourished. No distress.   HENT:   Head: Normocephalic and atraumatic.   Blind in both eyes   Eyes: Conjunctivae and EOM are normal. No scleral icterus.   Neck: Normal range of motion. Neck supple. No JVD present. No tracheal deviation present. No thyromegaly present.   Cardiovascular: Normal rate, regular rhythm, normal heart sounds and intact distal pulses.    No murmur heard.  Pulmonary/Chest: Effort normal. No respiratory distress. She has wheezes (mild). She has no rales. She exhibits no tenderness.   Abdominal: Soft. Bowel sounds are normal. She exhibits no distension. There is no tenderness.   Musculoskeletal: Normal range of motion. She exhibits edema. She exhibits no tenderness.   Lymphadenopathy:     She has no cervical adenopathy.   Neurological: She is alert and oriented to person, place, and time. No cranial nerve deficit. She exhibits normal muscle tone. Coordination normal.   Skin: Skin is warm and dry. She is not diaphoretic. No erythema.   Psychiatric: She has a normal mood and affect. Her behavior is normal. Thought content normal.   Nursing note and vitals reviewed.       Significant Labs:   ABGs:   Recent Labs  Lab 03/26/17  0357   PH 7.425   PCO2 37.3   HCO3 24.5   POCSATURATED 96   BE 0     BMP:   Recent Labs  Lab 03/26/17  0102   GLU 99      K 4.0      CO2 26   BUN 5*   CREATININE 0.9   CALCIUM 9.3     CBC:    Recent Labs  Lab 03/26/17  0102   WBC 5.44   HGB 11.3*   HCT 33.9*   *     CMP:   Recent Labs  Lab 03/26/17  0102      K 4.0      CO2 26   GLU 99   BUN 5*   CREATININE 0.9   CALCIUM 9.3   PROT 7.3   ALBUMIN 3.6   BILITOT 0.5   ALKPHOS 64   AST 26   ALT 22   ANIONGAP 10   EGFRNONAA >60     Cardiac Markers: No results for input(s): CKMB, MYOGLOBIN, BNP, TROPISTAT in the last 48 hours.  Respiratory Culture: No results for input(s): GSRESP, RESPIRATORYC in the last 48 hours.  Troponin: No results for input(s): TROPONINI in the last 48 hours.  Urine Studies: No results for input(s): COLORU, APPEARANCEUA, PHUR, SPECGRAV, PROTEINUA, GLUCUA, KETONESU, BILIRUBINUA, OCCULTUA, NITRITE, UROBILINOGEN, LEUKOCYTESUR, RBCUA, WBCUA, BACTERIA, SQUAMEPITHEL, HYALINECASTS in the last 48 hours.    Invalid input(s): WRIGHTSUR  All pertinent labs within the past 24 hours have been reviewed.    Significant Imaging:   Imaging Results         X-Ray Chest PA And Lateral (In process)

## 2017-03-27 LAB
ANION GAP SERPL CALC-SCNC: 9 MMOL/L
BASOPHILS # BLD AUTO: 0 K/UL
BASOPHILS NFR BLD: 0 %
BUN SERPL-MCNC: 7 MG/DL
CALCIUM SERPL-MCNC: 9.2 MG/DL
CHLORIDE SERPL-SCNC: 109 MMOL/L
CO2 SERPL-SCNC: 23 MMOL/L
CREAT SERPL-MCNC: 0.8 MG/DL
DIFFERENTIAL METHOD: ABNORMAL
EOSINOPHIL # BLD AUTO: 0 K/UL
EOSINOPHIL NFR BLD: 0 %
ERYTHROCYTE [DISTWIDTH] IN BLOOD BY AUTOMATED COUNT: 16.3 %
EST. GFR  (AFRICAN AMERICAN): >60 ML/MIN/1.73 M^2
EST. GFR  (NON AFRICAN AMERICAN): >60 ML/MIN/1.73 M^2
GLUCOSE SERPL-MCNC: 108 MG/DL
HCT VFR BLD AUTO: 33 %
HGB BLD-MCNC: 10.7 G/DL
LYMPHOCYTES # BLD AUTO: 0.9 K/UL
LYMPHOCYTES NFR BLD: 12.9 %
MAGNESIUM SERPL-MCNC: 2 MG/DL
MCH RBC QN AUTO: 27.6 PG
MCHC RBC AUTO-ENTMCNC: 32.4 %
MCV RBC AUTO: 85 FL
MONOCYTES # BLD AUTO: 0.9 K/UL
MONOCYTES NFR BLD: 13.3 %
NEUTROPHILS # BLD AUTO: 4.9 K/UL
NEUTROPHILS NFR BLD: 74 %
PHOSPHATE SERPL-MCNC: 3.9 MG/DL
PLATELET # BLD AUTO: 166 K/UL
PLATELET BLD QL SMEAR: ABNORMAL
PMV BLD AUTO: ABNORMAL FL
POTASSIUM SERPL-SCNC: 4 MMOL/L
RBC # BLD AUTO: 3.88 M/UL
SODIUM SERPL-SCNC: 141 MMOL/L
WBC # BLD AUTO: 6.61 K/UL

## 2017-03-27 PROCEDURE — 83735 ASSAY OF MAGNESIUM: CPT

## 2017-03-27 PROCEDURE — 63600175 PHARM REV CODE 636 W HCPCS: Performed by: INTERNAL MEDICINE

## 2017-03-27 PROCEDURE — 80048 BASIC METABOLIC PNL TOTAL CA: CPT

## 2017-03-27 PROCEDURE — 21400001 HC TELEMETRY ROOM

## 2017-03-27 PROCEDURE — 25000003 PHARM REV CODE 250: Performed by: INTERNAL MEDICINE

## 2017-03-27 PROCEDURE — 99900035 HC TECH TIME PER 15 MIN (STAT)

## 2017-03-27 PROCEDURE — 25000242 PHARM REV CODE 250 ALT 637 W/ HCPCS: Performed by: INTERNAL MEDICINE

## 2017-03-27 PROCEDURE — 36415 COLL VENOUS BLD VENIPUNCTURE: CPT

## 2017-03-27 PROCEDURE — 84100 ASSAY OF PHOSPHORUS: CPT

## 2017-03-27 PROCEDURE — 63600175 PHARM REV CODE 636 W HCPCS: Performed by: NURSE PRACTITIONER

## 2017-03-27 PROCEDURE — 94640 AIRWAY INHALATION TREATMENT: CPT

## 2017-03-27 PROCEDURE — 96372 THER/PROPH/DIAG INJ SC/IM: CPT

## 2017-03-27 PROCEDURE — 85025 COMPLETE CBC W/AUTO DIFF WBC: CPT

## 2017-03-27 PROCEDURE — 63600175 PHARM REV CODE 636 W HCPCS: Performed by: EMERGENCY MEDICINE

## 2017-03-27 RX ORDER — PREDNISONE 20 MG/1
40 TABLET ORAL 2 TIMES DAILY
Status: DISCONTINUED | OUTPATIENT
Start: 2017-03-27 | End: 2017-03-27

## 2017-03-27 RX ORDER — PREDNISONE 20 MG/1
40 TABLET ORAL DAILY
Status: DISCONTINUED | OUTPATIENT
Start: 2017-03-27 | End: 2017-03-29 | Stop reason: HOSPADM

## 2017-03-27 RX ORDER — ARFORMOTEROL TARTRATE 15 UG/2ML
15 SOLUTION RESPIRATORY (INHALATION) EVERY 12 HOURS
Status: DISCONTINUED | OUTPATIENT
Start: 2017-03-27 | End: 2017-03-29 | Stop reason: HOSPADM

## 2017-03-27 RX ADMIN — PREDNISONE 40 MG: 20 TABLET ORAL at 03:03

## 2017-03-27 RX ADMIN — PREDNISONE 40 MG: 20 TABLET ORAL at 09:03

## 2017-03-27 RX ADMIN — IPRATROPIUM BROMIDE AND ALBUTEROL SULFATE 3 ML: .5; 3 SOLUTION RESPIRATORY (INHALATION) at 07:03

## 2017-03-27 RX ADMIN — IPRATROPIUM BROMIDE AND ALBUTEROL SULFATE 3 ML: .5; 3 SOLUTION RESPIRATORY (INHALATION) at 08:03

## 2017-03-27 RX ADMIN — IPRATROPIUM BROMIDE AND ALBUTEROL SULFATE 3 ML: .5; 3 SOLUTION RESPIRATORY (INHALATION) at 01:03

## 2017-03-27 RX ADMIN — FAMOTIDINE 20 MG: 20 TABLET ORAL at 08:03

## 2017-03-27 RX ADMIN — ARFORMOTEROL TARTRATE 15 MCG: 15 SOLUTION RESPIRATORY (INHALATION) at 08:03

## 2017-03-27 RX ADMIN — FAMOTIDINE 20 MG: 20 TABLET ORAL at 09:03

## 2017-03-27 RX ADMIN — ENOXAPARIN SODIUM 40 MG: 100 INJECTION SUBCUTANEOUS at 04:03

## 2017-03-27 RX ADMIN — MOXIFLOXACIN HYDROCHLORIDE 400 MG: 400 TABLET, FILM COATED ORAL at 09:03

## 2017-03-27 RX ADMIN — IPRATROPIUM BROMIDE AND ALBUTEROL SULFATE 3 ML: .5; 3 SOLUTION RESPIRATORY (INHALATION) at 12:03

## 2017-03-27 NOTE — ASSESSMENT & PLAN NOTE
-Likely triggered from recent URI  -duoneb, Steroids-will increase frequency. Add LABA  -continue empiric Avelox  -counseled on identifying and controlling comorbodities that may contribute to flare up such as GEN, obesity, and URI.

## 2017-03-27 NOTE — PLAN OF CARE
Problem: Patient Care Overview  Goal: Plan of Care Review  Outcome: Ongoing (interventions implemented as appropriate)  Room air, respirations even and unlabored, expiratory wheezing, no distress noted on assessment, no pain nausea, shortness of breath on exertion, bed alarm

## 2017-03-27 NOTE — SUBJECTIVE & OBJECTIVE
Interval History:Continues to have shortness of breath and increasing wheezing. Low grade temp noted.     Review of Systems   Constitutional: Negative for activity change, diaphoresis, fatigue and unexpected weight change.   HENT: Negative for congestion, ear pain and sore throat.    Eyes: Visual disturbance: blind.   Respiratory: Positive for cough, shortness of breath and wheezing.    Cardiovascular: Negative for chest pain and palpitations.   Gastrointestinal: Negative for abdominal pain, constipation, diarrhea and vomiting.   Endocrine: Negative.    Genitourinary: Negative for flank pain, hematuria and urgency.   Musculoskeletal: Negative for joint swelling and neck pain.   Skin: Negative for pallor.   Neurological: Negative for seizures, syncope and light-headedness.   Hematological: Negative.    Psychiatric/Behavioral: Negative.      Objective:     Vital Signs (Most Recent):  Temp: 99 °F (37.2 °C) (03/27/17 0751)  Pulse: 79 (03/27/17 0751)  Resp: 20 (03/27/17 0751)  BP: (!) 144/88 (03/27/17 0751)  SpO2: 95 % (03/27/17 0751) Vital Signs (24h Range):  Temp:  [98.2 °F (36.8 °C)-99.1 °F (37.3 °C)] 99 °F (37.2 °C)  Pulse:  [] 79  Resp:  [16-20] 20  SpO2:  [94 %-99 %] 95 %  BP: (118-144)/(56-88) 144/88     Weight: 114.8 kg (253 lb)  Body mass index is 44.82 kg/(m^2).    Intake/Output Summary (Last 24 hours) at 03/27/17 1024  Last data filed at 03/27/17 0449   Gross per 24 hour   Intake               60 ml   Output                0 ml   Net               60 ml      Physical Exam   Constitutional: She is oriented to person, place, and time. She appears well-developed and well-nourished. No distress.   HENT:   Head: Normocephalic and atraumatic.   Blind in both eyes   Eyes: Conjunctivae are normal. No scleral icterus.   Neck: Normal range of motion. Neck supple. No JVD present. No tracheal deviation present. No thyromegaly present.   Cardiovascular: Normal rate, regular rhythm, normal heart sounds and intact  distal pulses.    No murmur heard.  Pulmonary/Chest: Effort normal. No respiratory distress. She has wheezes (inspiratory/expiratory). She has no rales. She exhibits no tenderness.   Abdominal: Soft. Bowel sounds are normal. She exhibits no distension. There is no tenderness.   Musculoskeletal: Normal range of motion. She exhibits no edema or tenderness.   Lymphadenopathy:     She has no cervical adenopathy.   Neurological: She is alert and oriented to person, place, and time. No cranial nerve deficit. She exhibits normal muscle tone. Coordination normal.   Skin: Skin is warm and dry. She is not diaphoretic. No erythema.   Psychiatric: She has a normal mood and affect. Her behavior is normal. Thought content normal.   Nursing note and vitals reviewed.      Significant Labs:   CBC:   Recent Labs  Lab 03/26/17 0102 03/27/17  0450   WBC 5.44 6.61   HGB 11.3* 10.7*   HCT 33.9* 33.0*   * 166     CMP:   Recent Labs  Lab 03/26/17 0102 03/27/17  0450    141   K 4.0 4.0    109   CO2 26 23   GLU 99 108   BUN 5* 7   CREATININE 0.9 0.8   CALCIUM 9.3 9.2   PROT 7.3  --    ALBUMIN 3.6  --    BILITOT 0.5  --    ALKPHOS 64  --    AST 26  --    ALT 22  --    ANIONGAP 10 9   EGFRNONAA >60 >60       Significant Imaging:   Imaging Results         CT Chest Without Contrast (Final result) Result time:  03/26/17 12:05:17    Final result by Tree Diehl MD (03/26/17 12:05:17)    Impression:         No acute findings.    All CT scans at this facility use dose modulation, iterative reconstruction, and/or weight based dosing when appropriate to reduce radiation dose to as low as reasonably achievable.       Electronically signed by: TREE DIEHL MD  Date:     03/26/17  Time:    12:05     Narrative:    CT scan of the chest without contrast, 03/26/17 12:00:12    History:    SOB       Findings:     Lung fields are clear.    No pleural fluid or pneumothorax.    No adenopathy is identified.    No significant osseous  abnormality.            X-Ray Chest PA And Lateral (Edited Result - FINAL) Result time:  03/26/17 09:14:23    Addendum 1 of 1 by Poncho Blackmon MD (03/26/17 09:14:23)    These findings were discussed with Dr. Leodan Miller in the emergency room on 03/26/2017 at 9:01 AM.      Electronically signed by: PONCHO BLACKMON MD  Date:     03/26/17  Time:    09:14           Final result by Poncho Blackmon MD (03/26/17 08:32:30)    Impression:         Recommend repeat chest x-ray with the patient's hair no out of the field of view to assess the bilateral upper lobes.            Electronically signed by: PONCHO BLACKMON MD  Date:     03/26/17  Time:    08:32     Narrative:    Exam: Two-view chest radiograph    Clinical History: .  Fever     Comparison: None.    Findings:   The patient's hair no overlie the bilateral upper lobes giving a reticular nodular infiltrative appearance of the bilateral upper lobes. The cardiac silhouette is within normal limits. No pleural effusion or pneumothorax. The bones are intact.

## 2017-03-27 NOTE — PROGRESS NOTES
[facility] Hospital Medicine  Progress Note    Patient Name: Jennifer Mcclain  MRN: [id number]  Patient Class: IP- Inpatient   Admission Date: 3/25/2017  Length of Stay: 1 days  Attending Physician: Curry Lewis MD  Primary Care Provider: Primary Doctor No        Subjective:     Principal Problem:Asthma exacerbation    HPI:  Ms. Mcclain is a 47 y/o AAF with h/o sarcoidosis followed by  (pulmonalogist), was taken off steroids one year ago, was in her usual state of health until 2 days, presented to the ED c/o worsening cough, and DENNIS without fever/chills. Reports mild wheezing. In the ED, oxygen saturations are high 90's, she received 3 rounds of nebulized treatments, with still being dyspneic and tachypneic. Se is admitted for mild acute COPD exacerbation. Patient has been a non-smoker all her life. CXR shows no evidence of infiltrates, but shows interstitial scarring.    Hospital Course:  Admitted for Asthma Exacerbation. Admits to URI symptoms x 3 days. Low grade temperature noted. CT Chest was negative. Asthma Exacerbation likely triggered from URI. She was treated with duoneb, LABA, systemic steroids.     Interval History:Continues to have shortness of breath and increasing wheezing. Low grade temp noted.     Review of Systems   Constitutional: Negative for activity change, diaphoresis, fatigue and unexpected weight change.   HENT: Negative for congestion, ear pain and sore throat.    Eyes: Visual disturbance: blind.   Respiratory: Positive for cough, shortness of breath and wheezing.    Cardiovascular: Negative for chest pain and palpitations.   Gastrointestinal: Negative for abdominal pain, constipation, diarrhea and vomiting.   Endocrine: Negative.    Genitourinary: Negative for flank pain, hematuria and urgency.   Musculoskeletal: Negative for joint swelling and neck pain.   Skin: Negative for pallor.   Neurological: Negative for seizures, syncope and light-headedness.    Hematological: Negative.    Psychiatric/Behavioral: Negative.      Objective:     Vital Signs (Most Recent):  Temp: 99 °F (37.2 °C) (03/27/17 0751)  Pulse: 79 (03/27/17 0751)  Resp: 20 (03/27/17 0751)  BP: (!) 144/88 (03/27/17 0751)  SpO2: 95 % (03/27/17 0751) Vital Signs (24h Range):  Temp:  [98.2 °F (36.8 °C)-99.1 °F (37.3 °C)] 99 °F (37.2 °C)  Pulse:  [] 79  Resp:  [16-20] 20  SpO2:  [94 %-99 %] 95 %  BP: (118-144)/(56-88) 144/88     Weight: 114.8 kg (253 lb)  Body mass index is 44.82 kg/(m^2).    Intake/Output Summary (Last 24 hours) at 03/27/17 1024  Last data filed at 03/27/17 0449   Gross per 24 hour   Intake               60 ml   Output                0 ml   Net               60 ml      Physical Exam   Constitutional: She is oriented to person, place, and time. She appears well-developed and well-nourished. No distress.   HENT:   Head: Normocephalic and atraumatic.   Blind in both eyes   Eyes: Conjunctivae are normal. No scleral icterus.   Neck: Normal range of motion. Neck supple. No JVD present. No tracheal deviation present. No thyromegaly present.   Cardiovascular: Normal rate, regular rhythm, normal heart sounds and intact distal pulses.    No murmur heard.  Pulmonary/Chest: Effort normal. No respiratory distress. She has wheezes (inspiratory/expiratory). She has no rales. She exhibits no tenderness.   Abdominal: Soft. Bowel sounds are normal. She exhibits no distension. There is no tenderness.   Musculoskeletal: Normal range of motion. She exhibits no edema or tenderness.   Lymphadenopathy:     She has no cervical adenopathy.   Neurological: She is alert and oriented to person, place, and time. No cranial nerve deficit. She exhibits normal muscle tone. Coordination normal.   Skin: Skin is warm and dry. She is not diaphoretic. No erythema.   Psychiatric: She has a normal mood and affect. Her behavior is normal. Thought content normal.   Nursing note and vitals reviewed.      Significant Labs:    CBC:   Recent Labs  Lab 03/26/17  0102 03/27/17  0450   WBC 5.44 6.61   HGB 11.3* 10.7*   HCT 33.9* 33.0*   * 166     CMP:   Recent Labs  Lab 03/26/17  0102 03/27/17  0450    141   K 4.0 4.0    109   CO2 26 23   GLU 99 108   BUN 5* 7   CREATININE 0.9 0.8   CALCIUM 9.3 9.2   PROT 7.3  --    ALBUMIN 3.6  --    BILITOT 0.5  --    ALKPHOS 64  --    AST 26  --    ALT 22  --    ANIONGAP 10 9   EGFRNONAA >60 >60       Significant Imaging:   Imaging Results         CT Chest Without Contrast (Final result) Result time:  03/26/17 12:05:17    Final result by Jorge Luis Rose MD (03/26/17 12:05:17)    Impression:         No acute findings.    All CT scans at this facility use dose modulation, iterative reconstruction, and/or weight based dosing when appropriate to reduce radiation dose to as low as reasonably achievable.       Electronically signed by: JORGE LUIS ROSE MD  Date:     03/26/17  Time:    12:05     Narrative:    CT scan of the chest without contrast, 03/26/17 12:00:12    History:    SOB       Findings:     Lung fields are clear.    No pleural fluid or pneumothorax.    No adenopathy is identified.    No significant osseous abnormality.            X-Ray Chest PA And Lateral (Edited Result - FINAL) Result time:  03/26/17 09:14:23    Addendum 1 of 1 by Poncho Blackmon MD (03/26/17 09:14:23)    These findings were discussed with Dr. Leodan Miller in the emergency room on 03/26/2017 at 9:01 AM.      Electronically signed by: PONCHO BLACKMON MD  Date:     03/26/17  Time:    09:14           Final result by Poncho Blackmon MD (03/26/17 08:32:30)    Impression:         Recommend repeat chest x-ray with the patient's hair no out of the field of view to assess the bilateral upper lobes.            Electronically signed by: PONCHO BLACKMON MD  Date:     03/26/17  Time:    08:32     Narrative:    Exam: Two-view chest radiograph    Clinical History: .  Fever     Comparison: None.    Findings:   The patient's hair no  overlie the bilateral upper lobes giving a reticular nodular infiltrative appearance of the bilateral upper lobes. The cardiac silhouette is within normal limits. No pleural effusion or pneumothorax. The bones are intact.                Assessment/Plan:      * Asthma exacerbation  -Likely triggered from recent URI  -duoneb, Steroids-will increase frequency. Add LABA  -continue empiric Avelox  -counseled on identifying and controlling comorbodities that may contribute to flare up such as GEN, obesity, and URI.        Sarcoidosis  -Follows Dr. Deleon outpatient  -Echocardiogram does not show elevated PA pressures    Morbid obesity  - BMI 45.3      VTE Risk Mitigation         Ordered     enoxaparin injection 40 mg  Daily     Route:  Subcutaneous        03/26/17 0620     Medium Risk of VTE  Once      03/26/17 0620          Benito Vázquez NP  Department of Hospital Medicine   [facility]

## 2017-03-27 NOTE — PLAN OF CARE
Met with patient. Patient currently lives in [place]. She is here visiting her son. She has no PCP but sees a pulmonology in [place], [place].      03/27/17 1139   Discharge Assessment   Assessment Type Discharge Planning Assessment   Confirmed/corrected address and phone number on facesheet? Yes   Assessment information obtained from? Patient;Medical Record   Prior to hospitilization cognitive status: Alert/Oriented   Prior to hospitalization functional status: Assistive Equipment   Current cognitive status: Alert/Oriented   Current Functional Status: Assistive Equipment   Arrived From home or self-care   Lives With significant other   Able to Return to Prior Arrangements yes   Is patient able to care for self after discharge? Unable to determine at this time (comments)  (Patient lives in [place], her son would like for her to move to [place]. She stated that she might move in a month or so. Her fiance currently assists her. Her son in  is her oldest son of 3, age 30. )   Patient's perception of discharge disposition home or selfcare   Readmission Within The Last 30 Days no previous admission in last 30 days   Patient currently being followed by outpatient case management? No   Patient currently receives home health services? No   Does the patient currently use HME? Yes   Equipment Currently Used at Home cane, straight  (Cane designed for assistance of the visually impaired. )   Do you have any problems affording any of your prescribed medications? TBD  (She does not currently have a Part D supplement. She buys her prescriptions out of pocket. )   Do you have any financial concerns preventing you from receiving the healthcare you need? No   Does the patient have transportation to healthcare appointments? Yes   Transportation Available family or friend will provide   On Dialysis? No   Discharge Plan A Home   Discharge Plan B Home with family   Patient/Family In Agreement With Plan yes

## 2017-03-28 LAB
ANION GAP SERPL CALC-SCNC: 11 MMOL/L
BASOPHILS # BLD AUTO: 0.01 K/UL
BASOPHILS NFR BLD: 0.1 %
BUN SERPL-MCNC: 12 MG/DL
CALCIUM SERPL-MCNC: 9.3 MG/DL
CHLORIDE SERPL-SCNC: 108 MMOL/L
CO2 SERPL-SCNC: 21 MMOL/L
CREAT SERPL-MCNC: 0.8 MG/DL
DIFFERENTIAL METHOD: ABNORMAL
EOSINOPHIL # BLD AUTO: 0 K/UL
EOSINOPHIL NFR BLD: 0 %
ERYTHROCYTE [DISTWIDTH] IN BLOOD BY AUTOMATED COUNT: 15.8 %
EST. GFR  (AFRICAN AMERICAN): >60 ML/MIN/1.73 M^2
EST. GFR  (NON AFRICAN AMERICAN): >60 ML/MIN/1.73 M^2
GLUCOSE SERPL-MCNC: 102 MG/DL
HCT VFR BLD AUTO: 36.5 %
HGB BLD-MCNC: 11.8 G/DL
LYMPHOCYTES # BLD AUTO: 0.7 K/UL
LYMPHOCYTES NFR BLD: 9.6 %
MCH RBC QN AUTO: 27.1 PG
MCHC RBC AUTO-ENTMCNC: 32.3 %
MCV RBC AUTO: 84 FL
MONOCYTES # BLD AUTO: 0.6 K/UL
MONOCYTES NFR BLD: 8.2 %
NEUTROPHILS # BLD AUTO: 6.3 K/UL
NEUTROPHILS NFR BLD: 82.6 %
PLATELET # BLD AUTO: 173 K/UL
PLATELET BLD QL SMEAR: ABNORMAL
PMV BLD AUTO: 12.1 FL
POTASSIUM SERPL-SCNC: 5 MMOL/L
RBC # BLD AUTO: 4.36 M/UL
SODIUM SERPL-SCNC: 140 MMOL/L
WBC # BLD AUTO: 7.64 K/UL

## 2017-03-28 PROCEDURE — 96372 THER/PROPH/DIAG INJ SC/IM: CPT

## 2017-03-28 PROCEDURE — 94640 AIRWAY INHALATION TREATMENT: CPT

## 2017-03-28 PROCEDURE — 25000242 PHARM REV CODE 250 ALT 637 W/ HCPCS: Performed by: INTERNAL MEDICINE

## 2017-03-28 PROCEDURE — 25000242 PHARM REV CODE 250 ALT 637 W/ HCPCS: Performed by: NURSE PRACTITIONER

## 2017-03-28 PROCEDURE — 85025 COMPLETE CBC W/AUTO DIFF WBC: CPT

## 2017-03-28 PROCEDURE — 21400001 HC TELEMETRY ROOM

## 2017-03-28 PROCEDURE — 80048 BASIC METABOLIC PNL TOTAL CA: CPT

## 2017-03-28 PROCEDURE — 63600175 PHARM REV CODE 636 W HCPCS: Performed by: EMERGENCY MEDICINE

## 2017-03-28 PROCEDURE — 25000003 PHARM REV CODE 250: Performed by: INTERNAL MEDICINE

## 2017-03-28 PROCEDURE — 63600175 PHARM REV CODE 636 W HCPCS: Performed by: NURSE PRACTITIONER

## 2017-03-28 PROCEDURE — 36415 COLL VENOUS BLD VENIPUNCTURE: CPT

## 2017-03-28 RX ORDER — IPRATROPIUM BROMIDE AND ALBUTEROL SULFATE 2.5; .5 MG/3ML; MG/3ML
3 SOLUTION RESPIRATORY (INHALATION)
Status: DISCONTINUED | OUTPATIENT
Start: 2017-03-28 | End: 2017-03-29 | Stop reason: HOSPADM

## 2017-03-28 RX ORDER — IPRATROPIUM BROMIDE AND ALBUTEROL SULFATE 2.5; .5 MG/3ML; MG/3ML
3 SOLUTION RESPIRATORY (INHALATION) EVERY 4 HOURS PRN
Status: DISCONTINUED | OUTPATIENT
Start: 2017-03-28 | End: 2017-03-29 | Stop reason: HOSPADM

## 2017-03-28 RX ADMIN — IPRATROPIUM BROMIDE AND ALBUTEROL SULFATE 3 ML: .5; 3 SOLUTION RESPIRATORY (INHALATION) at 08:03

## 2017-03-28 RX ADMIN — MOXIFLOXACIN HYDROCHLORIDE 400 MG: 400 TABLET, FILM COATED ORAL at 09:03

## 2017-03-28 RX ADMIN — FAMOTIDINE 20 MG: 20 TABLET ORAL at 09:03

## 2017-03-28 RX ADMIN — ARFORMOTEROL TARTRATE 15 MCG: 15 SOLUTION RESPIRATORY (INHALATION) at 08:03

## 2017-03-28 RX ADMIN — FAMOTIDINE 20 MG: 20 TABLET ORAL at 08:03

## 2017-03-28 RX ADMIN — PREDNISONE 40 MG: 20 TABLET ORAL at 09:03

## 2017-03-28 RX ADMIN — IPRATROPIUM BROMIDE AND ALBUTEROL SULFATE 3 ML: .5; 3 SOLUTION RESPIRATORY (INHALATION) at 12:03

## 2017-03-28 RX ADMIN — ENOXAPARIN SODIUM 40 MG: 100 INJECTION SUBCUTANEOUS at 04:03

## 2017-03-28 NOTE — PROGRESS NOTES
[facility] Hospital Medicine  Progress Note    Patient Name: Jennifer Mcclain  MRN: [id number]  Patient Class: IP- Inpatient   Admission Date: 3/25/2017  Length of Stay: 2 days  Attending Physician: Curry Lewis MD  Primary Care Provider: Primary Doctor No        Subjective:     Principal Problem:Asthma exacerbation    HPI:  Ms. Mcclain is a 47 y/o AAF with h/o sarcoidosis followed by  (pulmonalogist), was taken off steroids one year ago, was in her usual state of health until 2 days, presented to the ED c/o worsening cough, and DENNIS without fever/chills. Reports mild wheezing. In the ED, oxygen saturations are high 90's, she received 3 rounds of nebulized treatments, with still being dyspneic and tachypneic. Se is admitted for mild acute COPD exacerbation. Patient has been a non-smoker all her life. CXR shows no evidence of infiltrates, but shows interstitial scarring.    Hospital Course:  Admitted for Asthma Exacerbation. Admits to URI symptoms x 3 days. Low grade temperature noted. CT Chest was negative. Asthma Exacerbation likely triggered from URI. She was treated with duoneb, LABA, systemic steroids.     Interval History: Still has profuse wheezing and dyspnea. Is requesting local pulmonologist.     Review of Systems   Constitutional: Negative for activity change, diaphoresis, fatigue and unexpected weight change.   HENT: Negative for congestion, ear pain and sore throat.    Eyes: Visual disturbance: blind.   Respiratory: Positive for cough, shortness of breath and wheezing.    Cardiovascular: Negative for chest pain and palpitations.   Gastrointestinal: Negative for abdominal pain, constipation, diarrhea and vomiting.   Endocrine: Negative.    Genitourinary: Negative for flank pain, hematuria and urgency.   Musculoskeletal: Negative for joint swelling and neck pain.   Skin: Negative for pallor.   Neurological: Negative for seizures, syncope and light-headedness.   Hematological:  Negative.    Psychiatric/Behavioral: Negative.      Objective:     Vital Signs (Most Recent):  Temp: 98.8 °F (37.1 °C) (03/28/17 0715)  Pulse: 71 (03/28/17 0802)  Resp: (!) 24 (03/28/17 0802)  BP: (!) 153/84 (03/28/17 0715)  SpO2: 96 % (03/28/17 0802) Vital Signs (24h Range):  Temp:  [98.3 °F (36.8 °C)-98.8 °F (37.1 °C)] 98.8 °F (37.1 °C)  Pulse:  [71-97] 71  Resp:  [18-25] 24  SpO2:  [94 %-98 %] 96 %  BP: (137-153)/(71-89) 153/84     Weight: 114.8 kg (253 lb)  Body mass index is 44.82 kg/(m^2).    Intake/Output Summary (Last 24 hours) at 03/28/17 1050  Last data filed at 03/28/17 0500   Gross per 24 hour   Intake              240 ml   Output                0 ml   Net              240 ml      Physical Exam   Constitutional: She is oriented to person, place, and time. She appears well-developed and well-nourished. No distress.   HENT:   Head: Normocephalic and atraumatic.   Blind in both eyes   Eyes: Conjunctivae are normal. No scleral icterus.   Neck: Normal range of motion. Neck supple. No JVD present. No tracheal deviation present. No thyromegaly present.   Cardiovascular: Normal rate, regular rhythm, normal heart sounds and intact distal pulses.    No murmur heard.  Pulmonary/Chest: Effort normal. No respiratory distress. She has wheezes (inspiratory/expiratory). She has no rales. She exhibits no tenderness.   Abdominal: Soft. Bowel sounds are normal. She exhibits no distension. There is no tenderness.   Musculoskeletal: Normal range of motion. She exhibits no edema or tenderness.   Lymphadenopathy:     She has no cervical adenopathy.   Neurological: She is alert and oriented to person, place, and time. No cranial nerve deficit. She exhibits normal muscle tone. Coordination normal.   Skin: Skin is warm and dry. She is not diaphoretic. No erythema.   Psychiatric: She has a normal mood and affect. Her behavior is normal. Thought content normal.   Nursing note and vitals reviewed.    Significant Labs:   CBC:    Recent Labs  Lab 03/27/17 0450 03/28/17 0527   WBC 6.61 7.64   HGB 10.7* 11.8*   HCT 33.0* 36.5*    173     CMP:   Recent Labs  Lab 03/27/17 0450 03/28/17 0527    140   K 4.0 5.0    108   CO2 23 21*    102   BUN 7 12   CREATININE 0.8 0.8   CALCIUM 9.2 9.3   ANIONGAP 9 11   EGFRNONAA >60 >60       Significant Imaging:   Imaging Results         CT Chest Without Contrast (Final result) Result time:  03/26/17 12:05:17    Final result by Jorge Luis Rose MD (03/26/17 12:05:17)    Impression:         No acute findings.    All CT scans at this facility use dose modulation, iterative reconstruction, and/or weight based dosing when appropriate to reduce radiation dose to as low as reasonably achievable.       Electronically signed by: JORGE LUIS ROSE MD  Date:     03/26/17  Time:    12:05     Narrative:    CT scan of the chest without contrast, 03/26/17 12:00:12    History:    SOB       Findings:     Lung fields are clear.    No pleural fluid or pneumothorax.    No adenopathy is identified.    No significant osseous abnormality.            X-Ray Chest PA And Lateral (Edited Result - FINAL) Result time:  03/26/17 09:14:23    Addendum 1 of 1 by Poncho Blackmon MD (03/26/17 09:14:23)    These findings were discussed with Dr. Leodan Miller in the emergency room on 03/26/2017 at 9:01 AM.      Electronically signed by: PONCHO BLACKMON MD  Date:     03/26/17  Time:    09:14           Final result by Poncho Blackmon MD (03/26/17 08:32:30)    Impression:         Recommend repeat chest x-ray with the patient's hair no out of the field of view to assess the bilateral upper lobes.            Electronically signed by: PONCHO BLACKMON MD  Date:     03/26/17  Time:    08:32     Narrative:    Exam: Two-view chest radiograph    Clinical History: .  Fever     Comparison: None.    Findings:   The patient's hair no overlie the bilateral upper lobes giving a reticular nodular infiltrative appearance of the bilateral upper  lobes. The cardiac silhouette is within normal limits. No pleural effusion or pneumothorax. The bones are intact.            Assessment/Plan:      * Asthma exacerbation  -Likely triggered from recent URI  -scheduled duoneb,Labs, Steroids-oral Prednisone daily.   -continue empiric Avelox  -counseled on identifying and controlling comorbodities that may contribute to flare up such as GEN, obesity, and URI.  -does not have local pulmonologist and wants to establish care here. Will arrange for Dr. Kaplan follow up.         Sarcoidosis  -Follows Dr. Deleon outpatient  -Echocardiogram does not show elevated PA pressures  -will establish new pulmonologist at discharge.   -continue Prednisone    Morbid obesity  - BMI 45.3      VTE Risk Mitigation         Ordered     enoxaparin injection 40 mg  Daily     Route:  Subcutaneous        03/26/17 0620     Medium Risk of VTE  Once      03/26/17 0620          Benito Vázquez NP  Department of Hospital Medicine   [facility]

## 2017-03-28 NOTE — ASSESSMENT & PLAN NOTE
-Follows Dr. Deleon outpatient  -Echocardiogram does not show elevated PA pressures  -will establish new pulmonologist at discharge.   -continue Prednisone

## 2017-03-28 NOTE — SUBJECTIVE & OBJECTIVE
Interval History: Still has profuse wheezing and dyspnea. Is requesting local pulmonologist.     Review of Systems   Constitutional: Negative for activity change, diaphoresis, fatigue and unexpected weight change.   HENT: Negative for congestion, ear pain and sore throat.    Eyes: Visual disturbance: blind.   Respiratory: Positive for cough, shortness of breath and wheezing.    Cardiovascular: Negative for chest pain and palpitations.   Gastrointestinal: Negative for abdominal pain, constipation, diarrhea and vomiting.   Endocrine: Negative.    Genitourinary: Negative for flank pain, hematuria and urgency.   Musculoskeletal: Negative for joint swelling and neck pain.   Skin: Negative for pallor.   Neurological: Negative for seizures, syncope and light-headedness.   Hematological: Negative.    Psychiatric/Behavioral: Negative.      Objective:     Vital Signs (Most Recent):  Temp: 98.8 °F (37.1 °C) (03/28/17 0715)  Pulse: 71 (03/28/17 0802)  Resp: (!) 24 (03/28/17 0802)  BP: (!) 153/84 (03/28/17 0715)  SpO2: 96 % (03/28/17 0802) Vital Signs (24h Range):  Temp:  [98.3 °F (36.8 °C)-98.8 °F (37.1 °C)] 98.8 °F (37.1 °C)  Pulse:  [71-97] 71  Resp:  [18-25] 24  SpO2:  [94 %-98 %] 96 %  BP: (137-153)/(71-89) 153/84     Weight: 114.8 kg (253 lb)  Body mass index is 44.82 kg/(m^2).    Intake/Output Summary (Last 24 hours) at 03/28/17 1050  Last data filed at 03/28/17 0500   Gross per 24 hour   Intake              240 ml   Output                0 ml   Net              240 ml      Physical Exam   Constitutional: She is oriented to person, place, and time. She appears well-developed and well-nourished. No distress.   HENT:   Head: Normocephalic and atraumatic.   Blind in both eyes   Eyes: Conjunctivae are normal. No scleral icterus.   Neck: Normal range of motion. Neck supple. No JVD present. No tracheal deviation present. No thyromegaly present.   Cardiovascular: Normal rate, regular rhythm, normal heart sounds and intact distal  pulses.    No murmur heard.  Pulmonary/Chest: Effort normal. No respiratory distress. She has wheezes (inspiratory/expiratory). She has no rales. She exhibits no tenderness.   Abdominal: Soft. Bowel sounds are normal. She exhibits no distension. There is no tenderness.   Musculoskeletal: Normal range of motion. She exhibits no edema or tenderness.   Lymphadenopathy:     She has no cervical adenopathy.   Neurological: She is alert and oriented to person, place, and time. No cranial nerve deficit. She exhibits normal muscle tone. Coordination normal.   Skin: Skin is warm and dry. She is not diaphoretic. No erythema.   Psychiatric: She has a normal mood and affect. Her behavior is normal. Thought content normal.   Nursing note and vitals reviewed.    Significant Labs:   CBC:   Recent Labs  Lab 03/27/17 0450 03/28/17 0527   WBC 6.61 7.64   HGB 10.7* 11.8*   HCT 33.0* 36.5*    173     CMP:   Recent Labs  Lab 03/27/17 0450 03/28/17 0527    140   K 4.0 5.0    108   CO2 23 21*    102   BUN 7 12   CREATININE 0.8 0.8   CALCIUM 9.2 9.3   ANIONGAP 9 11   EGFRNONAA >60 >60       Significant Imaging:   Imaging Results         CT Chest Without Contrast (Final result) Result time:  03/26/17 12:05:17    Final result by Tree Diehl MD (03/26/17 12:05:17)    Impression:         No acute findings.    All CT scans at this facility use dose modulation, iterative reconstruction, and/or weight based dosing when appropriate to reduce radiation dose to as low as reasonably achievable.       Electronically signed by: TREE DIEHL MD  Date:     03/26/17  Time:    12:05     Narrative:    CT scan of the chest without contrast, 03/26/17 12:00:12    History:    SOB       Findings:     Lung fields are clear.    No pleural fluid or pneumothorax.    No adenopathy is identified.    No significant osseous abnormality.            X-Ray Chest PA And Lateral (Edited Result - FINAL) Result time:  03/26/17 09:14:23    Addendum  1 of 1 by Poncho Blackmon MD (03/26/17 09:14:23)    These findings were discussed with Dr. Leodan Miller in the emergency room on 03/26/2017 at 9:01 AM.      Electronically signed by: PONCHO BLACKMON MD  Date:     03/26/17  Time:    09:14           Final result by Poncho Blackmon MD (03/26/17 08:32:30)    Impression:         Recommend repeat chest x-ray with the patient's hair no out of the field of view to assess the bilateral upper lobes.            Electronically signed by: PONCHO BLACKMON MD  Date:     03/26/17  Time:    08:32     Narrative:    Exam: Two-view chest radiograph    Clinical History: .  Fever     Comparison: None.    Findings:   The patient's hair no overlie the bilateral upper lobes giving a reticular nodular infiltrative appearance of the bilateral upper lobes. The cardiac silhouette is within normal limits. No pleural effusion or pneumothorax. The bones are intact.

## 2017-03-28 NOTE — PLAN OF CARE
Problem: Patient Care Overview  Goal: Plan of Care Review  Outcome: Ongoing (interventions implemented as appropriate)  Room air, respirations even and unlabored, no distress noted on assessment, no pain or nausea, shortness of breath on exertion, bed alarm

## 2017-03-28 NOTE — ASSESSMENT & PLAN NOTE
-Likely triggered from recent URI  -scheduled duoneb,Labs, Steroids-oral Prednisone daily.   -continue empiric Avelox  -counseled on identifying and controlling comorbodities that may contribute to flare up such as GEN, obesity, and URI.  -does not have local pulmonologist and wants to establish care here. Will arrange for Dr. Kaplan follow up.

## 2017-03-29 VITALS
BODY MASS INDEX: 44.83 KG/M2 | SYSTOLIC BLOOD PRESSURE: 145 MMHG | WEIGHT: 253 LBS | HEIGHT: 63 IN | OXYGEN SATURATION: 96 % | RESPIRATION RATE: 17 BRPM | HEART RATE: 81 BPM | DIASTOLIC BLOOD PRESSURE: 70 MMHG | TEMPERATURE: 98 F

## 2017-03-29 LAB
ANION GAP SERPL CALC-SCNC: 10 MMOL/L
BASOPHILS # BLD AUTO: 0.01 K/UL
BASOPHILS NFR BLD: 0.1 %
BUN SERPL-MCNC: 12 MG/DL
CALCIUM SERPL-MCNC: 8.8 MG/DL
CHLORIDE SERPL-SCNC: 106 MMOL/L
CO2 SERPL-SCNC: 24 MMOL/L
CREAT SERPL-MCNC: 0.8 MG/DL
DIFFERENTIAL METHOD: ABNORMAL
EOSINOPHIL # BLD AUTO: 0 K/UL
EOSINOPHIL NFR BLD: 0.1 %
ERYTHROCYTE [DISTWIDTH] IN BLOOD BY AUTOMATED COUNT: 16 %
EST. GFR  (AFRICAN AMERICAN): >60 ML/MIN/1.73 M^2
EST. GFR  (NON AFRICAN AMERICAN): >60 ML/MIN/1.73 M^2
GLUCOSE SERPL-MCNC: 85 MG/DL
HCT VFR BLD AUTO: 36.4 %
HGB BLD-MCNC: 11.9 G/DL
LYMPHOCYTES # BLD AUTO: 1.9 K/UL
LYMPHOCYTES NFR BLD: 26.2 %
MCH RBC QN AUTO: 28.1 PG
MCHC RBC AUTO-ENTMCNC: 32.7 %
MCV RBC AUTO: 86 FL
MONOCYTES # BLD AUTO: 0.9 K/UL
MONOCYTES NFR BLD: 11.5 %
NEUTROPHILS # BLD AUTO: 4.6 K/UL
NEUTROPHILS NFR BLD: 62.4 %
PLATELET # BLD AUTO: 247 K/UL
PLATELET BLD QL SMEAR: ABNORMAL
PMV BLD AUTO: 11.7 FL
POTASSIUM SERPL-SCNC: 3.6 MMOL/L
RBC # BLD AUTO: 4.23 M/UL
SODIUM SERPL-SCNC: 140 MMOL/L
WBC # BLD AUTO: 7.41 K/UL

## 2017-03-29 PROCEDURE — 36415 COLL VENOUS BLD VENIPUNCTURE: CPT

## 2017-03-29 PROCEDURE — 96372 THER/PROPH/DIAG INJ SC/IM: CPT

## 2017-03-29 PROCEDURE — 25000242 PHARM REV CODE 250 ALT 637 W/ HCPCS: Performed by: NURSE PRACTITIONER

## 2017-03-29 PROCEDURE — 25000003 PHARM REV CODE 250: Performed by: NURSE PRACTITIONER

## 2017-03-29 PROCEDURE — 80048 BASIC METABOLIC PNL TOTAL CA: CPT

## 2017-03-29 PROCEDURE — 63600175 PHARM REV CODE 636 W HCPCS: Performed by: NURSE PRACTITIONER

## 2017-03-29 PROCEDURE — 25000003 PHARM REV CODE 250: Performed by: INTERNAL MEDICINE

## 2017-03-29 PROCEDURE — 85025 COMPLETE CBC W/AUTO DIFF WBC: CPT

## 2017-03-29 PROCEDURE — 94640 AIRWAY INHALATION TREATMENT: CPT

## 2017-03-29 RX ORDER — CETIRIZINE HYDROCHLORIDE 10 MG/1
10 TABLET ORAL DAILY
Status: DISCONTINUED | OUTPATIENT
Start: 2017-03-29 | End: 2017-03-29 | Stop reason: HOSPADM

## 2017-03-29 RX ORDER — ALPRAZOLAM 0.25 MG/1
0.25 TABLET ORAL ONCE
Status: COMPLETED | OUTPATIENT
Start: 2017-03-29 | End: 2017-03-29

## 2017-03-29 RX ORDER — IPRATROPIUM BROMIDE AND ALBUTEROL SULFATE 2.5; .5 MG/3ML; MG/3ML
3 SOLUTION RESPIRATORY (INHALATION) EVERY 4 HOURS PRN
Qty: 24 VIAL | Refills: 1 | Status: SHIPPED | OUTPATIENT
Start: 2017-03-29 | End: 2017-09-11 | Stop reason: SDUPTHER

## 2017-03-29 RX ORDER — ALPRAZOLAM 0.25 MG/1
0.25 TABLET ORAL DAILY PRN
Qty: 90 TABLET | Refills: 0 | Status: SHIPPED | OUTPATIENT
Start: 2017-03-29 | End: 2017-03-29

## 2017-03-29 RX ORDER — ALPRAZOLAM 0.25 MG/1
0.25 TABLET ORAL DAILY PRN
Qty: 90 TABLET | Refills: 0 | Status: SHIPPED | OUTPATIENT
Start: 2017-03-29 | End: 2017-08-29

## 2017-03-29 RX ORDER — PREDNISONE 10 MG/1
TABLET ORAL
Qty: 30 TABLET | Refills: 0 | Status: SHIPPED | OUTPATIENT
Start: 2017-03-29 | End: 2017-08-29 | Stop reason: ALTCHOICE

## 2017-03-29 RX ORDER — CETIRIZINE HYDROCHLORIDE 10 MG/1
10 TABLET ORAL DAILY
Qty: 30 TABLET | Refills: 1 | Status: SHIPPED | OUTPATIENT
Start: 2017-03-29 | End: 2021-07-26 | Stop reason: SDUPTHER

## 2017-03-29 RX ADMIN — ARFORMOTEROL TARTRATE 15 MCG: 15 SOLUTION RESPIRATORY (INHALATION) at 07:03

## 2017-03-29 RX ADMIN — CETIRIZINE HYDROCHLORIDE 10 MG: 10 TABLET, FILM COATED ORAL at 10:03

## 2017-03-29 RX ADMIN — FAMOTIDINE 20 MG: 20 TABLET ORAL at 08:03

## 2017-03-29 RX ADMIN — MOXIFLOXACIN HYDROCHLORIDE 400 MG: 400 TABLET, FILM COATED ORAL at 08:03

## 2017-03-29 RX ADMIN — IPRATROPIUM BROMIDE AND ALBUTEROL SULFATE 3 ML: .5; 3 SOLUTION RESPIRATORY (INHALATION) at 07:03

## 2017-03-29 RX ADMIN — PREDNISONE 40 MG: 20 TABLET ORAL at 08:03

## 2017-03-29 RX ADMIN — IPRATROPIUM BROMIDE AND ALBUTEROL SULFATE 3 ML: .5; 3 SOLUTION RESPIRATORY (INHALATION) at 01:03

## 2017-03-29 RX ADMIN — ALPRAZOLAM 0.25 MG: 0.25 TABLET ORAL at 10:03

## 2017-03-29 NOTE — DISCHARGE SUMMARY
[facility] Hospital Medicine  Discharge Summary      Patient Name: Jennifer Mcclain  MRN: [id number]  Admission Date: 3/25/2017  Hospital Length of Stay: 3 days  Discharge Date and Time:  03/29/2017 4:42 PM  Attending Physician: Curry Lewis MD   Discharging Provider: Benito Vázquez NP  Primary Care Provider: Primary Doctor No      HPI:   Ms. Mcclain is a 47 y/o AAF with h/o sarcoidosis followed by  (pulmonalogist), was taken off steroids one year ago, was in her usual state of health until 2 days, presented to the ED c/o worsening cough, and DENNIS without fever/chills. Reports mild wheezing. In the ED, oxygen saturations are high 90's, she received 3 rounds of nebulized treatments, with still being dyspneic and tachypneic. Se is admitted for mild acute COPD exacerbation. Patient has been a non-smoker all her life. CXR shows no evidence of infiltrates, but shows interstitial scarring.    * No surgery found *      Indwelling Lines/Drains at time of discharge:   Lines/Drains/Airways          No matching active lines, drains, or airways        Hospital Course:   Admitted for Asthma Exacerbation. Admits to URI symptoms x 3 days. Low grade temperature noted. CT Chest was negative. Asthma Exacerbation likely triggered from URI. She was treated with duoneb, LABA, systemic steroids. Slowly she improved clinically. She was counseled on identifying/controlling contributing factors such as obesity, GEN, and URI that may contribute to exacerbations. She will be discharged on steroid taper, duoneb, and cetirizine. Patient already has rescue inhaler. She is undecided if she wants to remain in [place] or go back to [place] in the next 2-3 weeks. Appt has been arranged with Dr. Kaplan on 4/4/17. Patient seen and examined on date of discharge and deemed suitable.     Consults:     Significant Diagnostic Studies: Labs:   CMP   Recent Labs  Lab 03/28/17  0527 03/29/17  0530    140   K 5.0 3.6  "   106   CO2 21* 24    85   BUN 12 12   CREATININE 0.8 0.8   CALCIUM 9.3 8.8   ANIONGAP 11 10   ESTGFRAFRICA >60 >60   EGFRNONAA >60 >60    and CBC   Recent Labs  Lab 03/28/17  0527 03/29/17  0530   WBC 7.64 7.41   HGB 11.8* 11.9*   HCT 36.5* 36.4*    247       Pending Diagnostic Studies:     None        Final Active Diagnoses:    Diagnosis Date Noted POA    PRINCIPAL PROBLEM:  Asthma exacerbation [J45.901] 03/26/2017 Yes    Sarcoidosis [D86.9] 03/26/2017 Yes    Morbid obesity [E66.01] 03/26/2017 Yes      Problems Resolved During this Admission:    Diagnosis Date Noted Date Resolved POA      * Asthma exacerbation  -Likely triggered from recent URI  -scheduled duoneb,Labs, Steroids-oral Prednisone daily.   -continue empiric Avelox  -counseled on identifying and controlling comorbodities that may contribute to flare up such as GEN, obesity, and URI.  -does not have local pulmonologist and wants to establish care here. Will arrange for Dr. Kaplan follow up.         Sarcoidosis  -Echocardiogram does not show elevated PA pressures  -will establish new pulmonologist at discharge.   -continue Prednisone    Morbid obesity  - BMI 45.3        Discharged Condition: stable    Disposition: Home or Self Care    Follow Up:  Follow-up Information     Follow up with Roldan Kaplan MD On 4/4/2017.    Specialty:  Pulmonary Disease    Contact information:    [address]  [facility] [id number] [phone]          Patient Instructions:     NEBULIZER FOR HOME USE   Order Specific Question Answer Comments   Height: 5' 3" (1.6 m)    Weight: 114.8 kg (253 lb)    Does patient have medical equipment at home? cane, straight Cane designed for assistance of the visually impaired.    Length of need (1-99 months): 99      Diet general     Activity as tolerated       Medications:  Reconciled Home Medications:   Current Discharge Medication List      START taking these medications    Details   albuterol-ipratropium " 2.5mg-0.5mg/3mL (DUO-NEB) 0.5 mg-3 mg(2.5 mg base)/3 mL nebulizer solution Take 3 mLs by nebulization every 4 (four) hours as needed for Wheezing. Rescue  Qty: 24 vial, Refills: 1      alprazolam (XANAX) 0.25 MG tablet Take 1 tablet (0.25 mg total) by mouth daily as needed for Anxiety.  Qty: 90 tablet, Refills: 0      cetirizine (ZYRTEC) 10 MG tablet Take 1 tablet (10 mg total) by mouth once daily.  Qty: 30 tablet, Refills: 1      predniSONE (DELTASONE) 10 MG tablet 40mg po daily x 4 days, then 30mg PO daily x 3 days, 20mg po daily x 2 daily, 10mg po daily x 1 days  Qty: 30 tablet, Refills: 0         CONTINUE these medications which have NOT CHANGED    Details   prednisoLONE acetate (PRED FORTE) 1 % DrpS 1 drop 4 (four) times daily.           Time spent on the discharge of patient: >35 minutes    Benito Vázquez NP  Department of Hospital Medicine  [facility]

## 2017-03-29 NOTE — ASSESSMENT & PLAN NOTE
-Echocardiogram does not show elevated PA pressures  -will establish new pulmonologist at discharge.   -continue Prednisone

## 2017-03-29 NOTE — PLAN OF CARE
Problem: Patient Care Overview  Goal: Plan of Care Review  Outcome: Ongoing (interventions implemented as appropriate)  Reviewed POC, including indications and possible side effects of administered medications. Pt verbalized understanding and teach back.     12 hour chart check complete.

## 2017-03-29 NOTE — PLAN OF CARE
Problem: Patient Care Overview  Goal: Plan of Care Review  Outcome: Ongoing (interventions implemented as appropriate)  Pt is maintaining adequate SpO2 levels on room air.Pt is receiving nebulized medications as ordered with NARN.

## 2017-03-29 NOTE — PROGRESS NOTES
Awakened patient for VS and am meds. She began coughing and was very SOB with auditory expiratory wheezing. O2 sat 95% on RA. Placed in souza position. Recently had a breathing treatment at 712 am. Will continue to monitor.

## 2017-03-29 NOTE — PLAN OF CARE
03/29/17 1346   Final Note   Assessment Type Final Discharge Note   Discharge Disposition Home   What phone number can be called within the next 1-3 days to see how you are doing after discharge? ([phone])   Hospital Follow Up  Appt(s) scheduled? Yes   Offered [facility] -- Bedside Delivery? n/a   Referral to Outpatient Case Management complete? n/a   Referral to / orders for Home Health Complete? n/a   30 day supply of medicines given at discharge, if documented non-compliance / non-adherence? n/a   Any social issues identified prior to discharge? No   Phoned [facility], [phone]. Patient accepted, nebulizer to be delivered to the home, [address] [place] [id number], today. Faxed demographics, order, H&P, MD progress note to [facility], [phone].

## 2017-03-29 NOTE — PLAN OF CARE
Problem: Patient Care Overview  Goal: Plan of Care Review  Outcome: Ongoing (interventions implemented as appropriate)  Pt remains free of injury, pain managed adequately, no s/s of distress. 24 hour chart check completed. Will continue to monitor.

## 2017-04-04 ENCOUNTER — TELEPHONE (OUTPATIENT)
Dept: PULMONOLOGY | Facility: CLINIC | Age: 47
End: 2017-04-04

## 2017-04-05 ENCOUNTER — OFFICE VISIT (OUTPATIENT)
Dept: PULMONOLOGY | Facility: CLINIC | Age: 47
End: 2017-04-05
Payer: MEDICARE

## 2017-04-05 VITALS
WEIGHT: 246.69 LBS | HEART RATE: 88 BPM | DIASTOLIC BLOOD PRESSURE: 78 MMHG | BODY MASS INDEX: 45.4 KG/M2 | HEIGHT: 62 IN | SYSTOLIC BLOOD PRESSURE: 148 MMHG

## 2017-04-05 DIAGNOSIS — E66.01 MORBID OBESITY DUE TO EXCESS CALORIES: ICD-10-CM

## 2017-04-05 DIAGNOSIS — D86.9 SARCOIDOSIS: ICD-10-CM

## 2017-04-05 DIAGNOSIS — H20.9 UVEITIC GLAUCOMA OF BOTH EYES, INDETERMINATE STAGE: Chronic | ICD-10-CM

## 2017-04-05 DIAGNOSIS — I51.89 DIASTOLIC DYSFUNCTION: Chronic | ICD-10-CM

## 2017-04-05 DIAGNOSIS — H40.43X4 UVEITIC GLAUCOMA OF BOTH EYES, INDETERMINATE STAGE: Chronic | ICD-10-CM

## 2017-04-05 DIAGNOSIS — J45.909 MODERATE ASTHMA: Primary | Chronic | ICD-10-CM

## 2017-04-05 DIAGNOSIS — G47.33 OSA (OBSTRUCTIVE SLEEP APNEA): Chronic | ICD-10-CM

## 2017-04-05 PROBLEM — J44.1 COPD EXACERBATION: Status: RESOLVED | Noted: 2017-03-26 | Resolved: 2017-04-05

## 2017-04-05 PROBLEM — H40.43X0: Chronic | Status: ACTIVE | Noted: 2017-04-05

## 2017-04-05 PROCEDURE — 99999 PR PBB SHADOW E&M-EST. PATIENT-LVL IV: CPT | Mod: PBBFAC,,, | Performed by: INTERNAL MEDICINE

## 2017-04-05 PROCEDURE — 99214 OFFICE O/P EST MOD 30 MIN: CPT | Mod: PBBFAC | Performed by: INTERNAL MEDICINE

## 2017-04-05 PROCEDURE — 99205 OFFICE O/P NEW HI 60 MIN: CPT | Mod: S$PBB,,, | Performed by: INTERNAL MEDICINE

## 2017-04-05 RX ORDER — BUDESONIDE AND FORMOTEROL FUMARATE DIHYDRATE 80; 4.5 UG/1; UG/1
2 AEROSOL RESPIRATORY (INHALATION) 2 TIMES DAILY
Qty: 1 INHALER | Refills: 6 | Status: SHIPPED | OUTPATIENT
Start: 2017-04-05 | End: 2017-04-10

## 2017-04-05 RX ORDER — DULOXETIN HYDROCHLORIDE 20 MG/1
30 CAPSULE, DELAYED RELEASE ORAL DAILY
COMMUNITY
End: 2018-06-20

## 2017-04-05 NOTE — PATIENT INSTRUCTIONS
Asthma Action Plan     Your name:  _________________________  Emergency contact:  _________________________  Healthcare provider:  _________________________ Today's Date:  _________________________  Phone:  _________________________  Signature:  _________________________ Next appt (date/time):  _________________________  Phone:  _________________________  Phone:  _________________________      Green zone   My symptoms What I should do My medicine   · No wheezing, coughing, or chest tightness  · Asthma is not bothering your sleep, work, or school  · You rarely or never use your quick-relief medicine  Peak flow is:     _____________________  80%-100% of personal best · Keep taking your long-term  controller medicines  · Take your quick-relief   medicines as needed  Avoid your asthma triggers (list):  __________________________     __________________________     __________________________     __________________________     __________________________ Long-term controllers:  __________________________  Name:  __________________________  Dose:  __________________________  How often:  __________________________  Special instructions:  __________________________  Quick-relief:  __________________________  __________________________  Before exercise:  __________________________      Yellow zone   My symptoms What I should do My medicine   · Some wheezing, coughing, or chest tightness  · When at rest, your breathing is a little faster than normal  · Asthma symptoms wake you up at night  Peak flow is:     ___________________________  50%-80% of personal best, or   has lessened by at least 15%     You begin to have symptoms of a respiratory infection, if infections trigger your symptoms · Keep taking your long-term controller medicines  · Use your quick-relief medicine  · If you do not feel better within an hour after using your quick-relief medicine, make sure you know what to do! You might use more medicine or use another  medicine.  · Call your healthcare provider if you are unsure Continue to take long-term controllers:  _________________________  Name:  _________________________  Dose:  _________________________  How often:  _________________________  Special instructions  _________________________     Name:  _________________________  Dose:  _________________________  How often:  _________________________  Special instructions:  _________________________  Quick-relief:  _________________________  _________________________     If your symptoms don't go away after 1 hour, take:  _________________________      Red zone   My symptoms What I should do My medicine   · Continuous wheezing, coughing, or trouble breathing  · Trouble walking or talking  · Asthma symptoms make it hard for you to sleep     Peak flow is:     __________________________  Less than 50% of personal best · Use your quick-relief medicines  · Call your healthcare provider     Call 911 if:  · It is getting harder to breathe  · You can't walk or talk  · Your lips or fingers look gray or blue Quick-relief:  __________________________  __________________________     Quick-relief:  __________________________  __________________________     Quick-relief:  __________________________  __________________________      Date Last Reviewed: 10/1/2016  © 8778-4576 [employer]. [address] [place] [address], [place] [address]. All rights reserved. This information is not intended as a substitute for professional medical care. Always follow your healthcare professional's instructions.        Low-Salt Diet  This diet removes foods that are high in salt. It also limits the amount of salt you use when cooking. It is most often used for people with high blood pressure, edema (fluid retention), and kidney, liver, or heart disease.  Table salt contains the mineral sodium. Your body needs sodium to work normally. But too much sodium can make your health problems worse. Your  healthcare provider is recommending a low-salt (also called low-sodium) diet for you. Your total daily allowance of salt is 1,500 to 2,300 milligrams (mg). It is less than 1 teaspoon of table salt. This means you can have only about 500 to 700 mg of sodium at each meal. People with certain health problems should limit salt intake to the lower end of the recommended range.    When you cook, dont add much salt. If you can cook without using salt, even better. Dont add salt to your food at the table.  When shopping, read food labels. Salt is often called sodium on the label. Choose foods that are salt-free, low salt, or very low salt. Note that foods with reduced salt may not lower your salt intake enough.    Beans, potatoes, and pasta  Ok: Dry beans, split peas, lentils, potatoes, rice, macaroni, pasta, spaghetti without added salt  Avoid: Potato chips, tortilla chips, and similar products  Breads and cereals  Ok: Low-sodium breads, rolls, cereals, and cakes; low-salt crackers, matzo crackers  Avoid: Salted crackers, pretzels, popcorn, Burkinan toast, pancakes, muffins  Dairy  Ok: Milk, chocolate milk, hot chocolate mix, low-salt cheeses, and yogurt  Avoid: Processed cheese and cheese spreads; Roquefort, Camembert, and cottage cheese; buttermilk, instant breakfast drink  Desserts  Ok: Ice cream, frozen yogurt, juice bars, gelatin, cookies and pies, sugar, honey, jelly, hard candy  Avoid: Most pies, cakes and cookies prepared or processed with salt; instant pudding  Drinks  Ok: Tea, coffee, fizzy (carbonated) drinks, juices  Avoid: Flavored coffees, electrolyte replacement drinks, sports drinks  Meats  Ok: All fresh meat, fish, poultry, low-salt tuna, eggs, egg substitute  Avoid: Smoked, pickled, brine-cured, or salted meats and fish. This includes null, chipped beef, corned beef, hot dogs, deli meats, ham, kosher meats, salt pork, sausage, canned tuna, salted codfish, smoked salmon, herring, sardines, or  anchovies.  Seasonings and spices  Ok: Most seasonings are okay. Good substitutes for salt include: fresh herb blends, hot sauce, lemon, garlic, bal, vinegar, dry mustard, parsley, cilantro, horseradish, tomato paste, regular margarine, mayonnaise, unsalted butter, cream cheese, vegetable oil, cream, low-salt salad dressing and gravy.  Avoid: Regular ketchup, relishes, pickles, soy sauce, teriyaki sauce, Worcestershire sauce, BBQ sauce, tartar sauce, meat tenderizer, chili sauce, regular gravy, regular salad dressing, salted butter  Soups  Ok: Low-salt soups and broths made with allowed foods  Avoid: Bouillon cubes, soups with smoked or salted meats, regular soup and broth  Vegetables  Ok: Most vegetables are okay; also low-salt tomato and vegetable juices  Avoid: Sauerkraut and other brine-soaked vegetables; pickles and other pickled vegetables; tomato juice, olives  Date Last Reviewed: 8/1/2016 © 2000-2016 [employer]. [address] [place] [address]. All rights reserved. This information is not intended as a substitute for professional medical care. Always follow your healthcare professional's instructions.      Your provider has scheduled you for a sleep study.   You should be receiving a phone call from the sleep lab shortly after your study has been approved by your insurance. Please make sure you have your current phone numbers in the [facility] system. If you do not hear from anyone in the next 10 business days, please call the sleep lab at [phone] to schedule your sleep study. The sleep studies are performed at [facility] seven nights a week.  When you are scheduling your sleep study, they will also make you a follow up appointment with your provider. This follow up appointment will be 10-14 days after your sleep study to review the results. If it is noted that you do have sleep apnea on your initial sleep study, you may receive a call back for a second  night study with the CPAP before you come back to the office.       Inhaler Use  The inhaler that you were prescribed contains a potent medicine. It should only be used as directed. The medicine in your inhaler must be breathed deeply into your lungs for it to work. It will not work at all if it only reaches your mouth and throat. Follow the instructions below for best results. And remember to follow your asthma action plan as given to you by your doctor.  1. Keep your inhaler at room temperature.  2. Hold the inhaler so that the part that goes into your mouth is at the bottom.  3. Shake the inhaler well and remove the cap.  4. Breathe out through your mouth to fully empty your lungs.  5. Place the inhaler in your mouth and close your lips tightly around it. (Or hold the inhaler 1 to 2 inches from your open mouth if told to do so by your healthcare provider.)  6. Squeeze the inhaler as you breathe in slowly through your mouth until your lungs are full of air, drawing the medicine deep into your lungs.  7. Hold your breath for 10 seconds, or as long as you can comfortably hold your breath. Then breathe out slowly.  8. If you have been advised to take 2 puffs, wait 5 minutes, then repeat steps 3-7 above. Waiting 5 minutes between puffs will alow the medicine to open up your lungs so the second puff can get deeper into the lungs. Replace the cap when done.  9. If you were prescribed both a steroid inhaler and a bronchodilator inhaler, use the bronchodilator first to open the air passages. Wait 5 minutes, then use the steroid inhaler.  10. Rinse your mouth with water and spit it out (especially after using a steroid inhaler). This is very important if you are using a steroid inhaler to prevent thrush, a mild yeast infection of the mouth and back of the throat.  11. A special chamber (spacer) may be prescribed that attaches to your inhaler. This increases the amount of medicine that goes to your lungs. It also improves  how well each treatment works. Ask your doctor about this if you did not receive one.    Keep it clean  Remove the metal canister and do not immerse it in water. Then clean the plastic mouthpiece, cap, and spacer if you have one, by rinsing them well in warm running water for 30 to 60 seconds. Shake off excess water and allow the mouthpiece to dry completely (overnight is recommended). This should be done once a week. If you need the inhaler before the mouthpiece is dry, shake off excess water, replace canister, and test spray 2 times (away from the face).  Warning  A steroid inhaler is used to prevent an asthma attack. Do not use this to treat an acute wheezing episode. Use only bronchodilator inhalers (quick relief) to treat an acute asthma attack.  If you find that your medicine is not working and you need to use it more often than prescribed, this could be a sign that your asthma is getting worse. Go to the emergency room or urgent care right away. An asthma attack is easiest to treat in the early stages before it becomes severe.  When to seek medical advice  Get prompt medical attention if any of the following occur:  · Increased wheezing or shortness of breath  · Need to use your inhalers more often than usual without relief  · Fever of 100.4°F (38°C) or higher, or as directed by your healthcare provider  · Coughing up lots of dark-colored or bloody sputum (mucus)  · Chest pain with each breath  · Blue lips or fingernails  · Peak flow reading less than 50% of your normal best  Date Last Reviewed: 12/2/2015  © 6034-9975 [employer]. [address] [place] [address]. All rights reserved. This information is not intended as a substitute for professional medical care. Always follow your healthcare professional's instructions.        Step-by-Step  Using an Inhaler (in Mouth) Without a Spacer    Date Last Reviewed: 5/29/2015  © 1761-5808 [employer]. [address] [place] [address],  [place] Patel [address]. All rights reserved. This information is not intended as a substitute for professional medical care. Always follow your healthcare professional's instructions.        Step-by-Step  Using an Inhaler with a Spacer    Date Last Reviewed: 5/29/2015  © 8781-1235 [employer]. [address] [place] [address], [place] [address]. All rights reserved. This information is not intended as a substitute for professional medical care. Always follow your healthcare professional's instructions.

## 2017-04-05 NOTE — LETTER
April 5, 2017      Benito Vázquez NP  [address] [place] [address]           [facility]  [address]  [place] [address]  Phone: [phone]  Fax: [phone]          Patient: Jennifer Mcclain   MR Number: [id number]   YOB: 1970   Date of Visit: 4/5/2017       Dear Benito Vázquez:    Thank you for referring Jennifer Mcclain to me for evaluation. Attached you will find relevant portions of my assessment and plan of care.    If you have questions, please do not hesitate to call me. I look forward to following Jennifer Mcclain along with you.    Sincerely,    Mike Mota MD    Enclosure  CC:  No Recipients    If you would like to receive this communication electronically, please contact [email or web address] or [phone] to request more information on [facility] access.    For providers and/or their staff who would like to refer a patient to [facility], please contact us through our one-stop-shop provider referral line, [facility], at [phone].    If you feel you have received this communication in error or would no longer like to receive these types of communications, please e-mail [email or web address]

## 2017-04-05 NOTE — MR AVS SNAPSHOT
[facility]  [address] [facility]  [place] [address]  Phone: [phone]  Fax: [phone]                  Jennifer Mcclain   2017 10:00 AM   Office Visit    Description:  Female : 1970   Provider:  Mike Mota MD   Department:  [facility]           Reason for Visit     Asthma           Diagnoses this Visit        Comments    Morbid obesity due to excess calories    -  Primary     Moderate asthma         Diastolic dysfunction         GEN (obstructive sleep apnea)         Uveitic glaucoma of both eyes, indeterminate stage         Sarcoidosis                To Do List           Future Appointments        Provider Department Dept Phone    4/10/2017 2:00 PM Ananth Espana MD [facility] - Cardiology [phone]    2017 2:30 PM LABORATORY, [address] [facility] [phone]    2017 2:40 PM PULMONARY LAB, [facility] - Pulm Function [facility] [phone]    2017 3:20 PM PULMONARY LAB, [facility] - Pulm Function [facility] [phone]    2017 3:40 PM Mike Mota MD [facility] [phone]      Goals (5 Years of Data)     None      Follow-Up and Disposition     Return in about 8 weeks (around 2017) for release medical records,ref cards and eye, PFT, PSG, 6mwd, Ace,.       These Medications        Disp Refills Start End    budesonide-formoterol 80-4.5 mcg (SYMBICORT) 80-4.5 mcg/actuation HFAA 1 Inhaler 6 2017    Inhale 2 puffs into the lungs 2 (two) times daily. - Inhalation    Pharmacy: [facility]/pharmacy #[id number] - [place] - [address] [facility] #: [phone]         [facility]     [facility] Care [facility] - 24/7 Assistance  Unless otherwise directed by your provider, please contact [facility], our nurse care line that is available for 24/7 assistance.     Registered nurses in the [facility] provide:  "appointment scheduling, clinical advisement, health education, and other advisory services.  Call: [phone] (toll free)               Medications           Message regarding Medications     Verify the changes and/or additions to your medication regime listed below are the same as discussed with your clinician today.  If any of these changes or additions are incorrect, please notify your healthcare provider.        START taking these NEW medications        Refills    budesonide-formoterol 80-4.5 mcg (SYMBICORT) 80-4.5 mcg/actuation HFAA 6    Sig: Inhale 2 puffs into the lungs 2 (two) times daily.    Class: Normal    Route: Inhalation           Verify that the below list of medications is an accurate representation of the medications you are currently taking.  If none reported, the list may be blank. If incorrect, please contact your healthcare provider. Carry this list with you in case of emergency.           Current Medications     albuterol-ipratropium 2.5mg-0.5mg/3mL (DUO-NEB) 0.5 mg-3 mg(2.5 mg base)/3 mL nebulizer solution Take 3 mLs by nebulization every 4 (four) hours as needed for Wheezing. Rescue    alprazolam (XANAX) 0.25 MG tablet Take 1 tablet (0.25 mg total) by mouth daily as needed for Anxiety.    cetirizine (ZYRTEC) 10 MG tablet Take 1 tablet (10 mg total) by mouth once daily.    duloxetine (CYMBALTA) 20 MG capsule Take 30 mg by mouth once daily.    prednisoLONE acetate (PRED FORTE) 1 % DrpS 1 drop 4 (four) times daily.    predniSONE (DELTASONE) 10 MG tablet 40mg po daily x 4 days, then 30mg PO daily x 3 days, 20mg po daily x 2 daily, 10mg po daily x 1 days    budesonide-formoterol 80-4.5 mcg (SYMBICORT) 80-4.5 mcg/actuation HFAA Inhale 2 puffs into the lungs 2 (two) times daily.           Clinical Reference Information           Your Vitals Were     BP Pulse Height Weight BMI    148/78 88 5' 2" (1.575 m) 111.9 kg (246 lb 11.1 oz) 45.12 kg/m2      Blood Pressure          Most Recent Value    BP  " (!)  148/78      Allergies as of 4/5/2017     No Known Allergies      Immunizations Administered on Date of Encounter - 4/5/2017     None      Orders Placed During Today's Visit      Normal Orders This Visit    Ambulatory Referral to Cardiology     Ambulatory Referral to Ophthalmology     Future Labs/Procedures Expected by Expires    ANGIOTENSIN CONVERTING ENZYME  4/5/2017 6/4/2018    Complete PFT without bronchodilator - Clinic  As directed 4/5/2018    Polysomnogram (CPAP will be added if patient meets diagnostic criteria.)  As directed 4/5/2018    Stress test, pulmonary  As directed 4/5/2018      [facility] Sign-Up     Activating your [facility] account is as easy as 1-2-3!     1) Visit [email or web address], select Sign Up Now, enter this activation code and your date of birth, then select Next.  [email or web address]  Expires: 5/13/2017  1:13 PM      2) Create a username and password to use when you visit [facility] in the future and select a security question in case you lose your password and select Next.    3) Enter your e-mail address and click Sign Up!    Additional Information  If you have questions, please e-mail [email or web address] or call [phone] to talk to our [facility] staff. Remember, [facility] is NOT to be used for urgent needs. For medical emergencies, dial 911.         Instructions      Asthma Action Plan     Your name:  _________________________  Emergency contact:  _________________________  Healthcare provider:  _________________________ Today's Date:  _________________________  Phone:  _________________________  Signature:  _________________________ Next appt (date/time):  _________________________  Phone:  _________________________  Phone:  _________________________      Green zone   My symptoms What I should do My medicine   · No wheezing, coughing, or chest tightness  · Asthma is not bothering your sleep, work, or school  · You rarely or never use your quick-relief medicine  Peak flow  is:     _____________________  80%-100% of personal best · Keep taking your long-term  controller medicines  · Take your quick-relief   medicines as needed  Avoid your asthma triggers (list):  __________________________     __________________________     __________________________     __________________________     __________________________ Long-term controllers:  __________________________  Name:  __________________________  Dose:  __________________________  How often:  __________________________  Special instructions:  __________________________  Quick-relief:  __________________________  __________________________  Before exercise:  __________________________      Yellow zone   My symptoms What I should do My medicine   · Some wheezing, coughing, or chest tightness  · When at rest, your breathing is a little faster than normal  · Asthma symptoms wake you up at night  Peak flow is:     ___________________________  50%-80% of personal best, or   has lessened by at least 15%     You begin to have symptoms of a respiratory infection, if infections trigger your symptoms · Keep taking your long-term controller medicines  · Use your quick-relief medicine  · If you do not feel better within an hour after using your quick-relief medicine, make sure you know what to do! You might use more medicine or use another medicine.  · Call your healthcare provider if you are unsure Continue to take long-term controllers:  _________________________  Name:  _________________________  Dose:  _________________________  How often:  _________________________  Special instructions  _________________________     Name:  _________________________  Dose:  _________________________  How often:  _________________________  Special instructions:  _________________________  Quick-relief:  _________________________  _________________________     If your symptoms don't go away after 1 hour, take:  _________________________      Red zone   My  symptoms What I should do My medicine   · Continuous wheezing, coughing, or trouble breathing  · Trouble walking or talking  · Asthma symptoms make it hard for you to sleep     Peak flow is:     __________________________  Less than 50% of personal best · Use your quick-relief medicines  · Call your healthcare provider     Call 911 if:  · It is getting harder to breathe  · You can't walk or talk  · Your lips or fingers look gray or blue Quick-relief:  __________________________  __________________________     Quick-relief:  __________________________  __________________________     Quick-relief:  __________________________  __________________________      Date Last Reviewed: 10/1/2016  © 4750-2641 [employer]. [address] [place] [address]. All rights reserved. This information is not intended as a substitute for professional medical care. Always follow your healthcare professional's instructions.        Low-Salt Diet  This diet removes foods that are high in salt. It also limits the amount of salt you use when cooking. It is most often used for people with high blood pressure, edema (fluid retention), and kidney, liver, or heart disease.  Table salt contains the mineral sodium. Your body needs sodium to work normally. But too much sodium can make your health problems worse. Your healthcare provider is recommending a low-salt (also called low-sodium) diet for you. Your total daily allowance of salt is 1,500 to 2,300 milligrams (mg). It is less than 1 teaspoon of table salt. This means you can have only about 500 to 700 mg of sodium at each meal. People with certain health problems should limit salt intake to the lower end of the recommended range.    When you cook, dont add much salt. If you can cook without using salt, even better. Dont add salt to your food at the table.  When shopping, read food labels. Salt is often called sodium on the label. Choose foods that are salt-free, low  salt, or very low salt. Note that foods with reduced salt may not lower your salt intake enough.    Beans, potatoes, and pasta  Ok: Dry beans, split peas, lentils, potatoes, rice, macaroni, pasta, spaghetti without added salt  Avoid: Potato chips, tortilla chips, and similar products  Breads and cereals  Ok: Low-sodium breads, rolls, cereals, and cakes; low-salt crackers, matzo crackers  Avoid: Salted crackers, pretzels, popcorn, Georgian toast, pancakes, muffins  Dairy  Ok: Milk, chocolate milk, hot chocolate mix, low-salt cheeses, and yogurt  Avoid: Processed cheese and cheese spreads; Roquefort, Camembert, and cottage cheese; buttermilk, instant breakfast drink  Desserts  Ok: Ice cream, frozen yogurt, juice bars, gelatin, cookies and pies, sugar, honey, jelly, hard candy  Avoid: Most pies, cakes and cookies prepared or processed with salt; instant pudding  Drinks  Ok: Tea, coffee, fizzy (carbonated) drinks, juices  Avoid: Flavored coffees, electrolyte replacement drinks, sports drinks  Meats  Ok: All fresh meat, fish, poultry, low-salt tuna, eggs, egg substitute  Avoid: Smoked, pickled, brine-cured, or salted meats and fish. This includes null, chipped beef, corned beef, hot dogs, deli meats, ham, kosher meats, salt pork, sausage, canned tuna, salted codfish, smoked salmon, herring, sardines, or anchovies.  Seasonings and spices  Ok: Most seasonings are okay. Good substitutes for salt include: fresh herb blends, hot sauce, lemon, garlic, bal, vinegar, dry mustard, parsley, cilantro, horseradish, tomato paste, regular margarine, mayonnaise, unsalted butter, cream cheese, vegetable oil, cream, low-salt salad dressing and gravy.  Avoid: Regular ketchup, relishes, pickles, soy sauce, teriyaki sauce, Worcestershire sauce, BBQ sauce, tartar sauce, meat tenderizer, chili sauce, regular gravy, regular salad dressing, salted butter  Soups  Ok: Low-salt soups and broths made with allowed foods  Avoid: Bouillon cubes,  soups with smoked or salted meats, regular soup and broth  Vegetables  Ok: Most vegetables are okay; also low-salt tomato and vegetable juices  Avoid: Sauerkraut and other brine-soaked vegetables; pickles and other pickled vegetables; tomato juice, olives  Date Last Reviewed: 8/1/2016 © 2000-2016 [employer]. [address] [place] [address]. All rights reserved. This information is not intended as a substitute for professional medical care. Always follow your healthcare professional's instructions.      Your provider has scheduled you for a sleep study.   You should be receiving a phone call from the sleep lab shortly after your study has been approved by your insurance. Please make sure you have your current phone numbers in the [facility] system. If you do not hear from anyone in the next 10 business days, please call the sleep lab at [phone] to schedule your sleep study. The sleep studies are performed at [facility] seven nights a week.  When you are scheduling your sleep study, they will also make you a follow up appointment with your provider. This follow up appointment will be 10-14 days after your sleep study to review the results. If it is noted that you do have sleep apnea on your initial sleep study, you may receive a call back for a second night study with the CPAP before you come back to the office.       Inhaler Use  The inhaler that you were prescribed contains a potent medicine. It should only be used as directed. The medicine in your inhaler must be breathed deeply into your lungs for it to work. It will not work at all if it only reaches your mouth and throat. Follow the instructions below for best results. And remember to follow your asthma action plan as given to you by your doctor.  1. Keep your inhaler at room temperature.  2. Hold the inhaler so that the part that goes into your mouth is at the bottom.  3. Shake the inhaler well and remove the  cap.  4. Breathe out through your mouth to fully empty your lungs.  5. Place the inhaler in your mouth and close your lips tightly around it. (Or hold the inhaler 1 to 2 inches from your open mouth if told to do so by your healthcare provider.)  6. Squeeze the inhaler as you breathe in slowly through your mouth until your lungs are full of air, drawing the medicine deep into your lungs.  7. Hold your breath for 10 seconds, or as long as you can comfortably hold your breath. Then breathe out slowly.  8. If you have been advised to take 2 puffs, wait 5 minutes, then repeat steps 3-7 above. Waiting 5 minutes between puffs will alow the medicine to open up your lungs so the second puff can get deeper into the lungs. Replace the cap when done.  9. If you were prescribed both a steroid inhaler and a bronchodilator inhaler, use the bronchodilator first to open the air passages. Wait 5 minutes, then use the steroid inhaler.  10. Rinse your mouth with water and spit it out (especially after using a steroid inhaler). This is very important if you are using a steroid inhaler to prevent thrush, a mild yeast infection of the mouth and back of the throat.  11. A special chamber (spacer) may be prescribed that attaches to your inhaler. This increases the amount of medicine that goes to your lungs. It also improves how well each treatment works. Ask your doctor about this if you did not receive one.    Keep it clean  Remove the metal canister and do not immerse it in water. Then clean the plastic mouthpiece, cap, and spacer if you have one, by rinsing them well in warm running water for 30 to 60 seconds. Shake off excess water and allow the mouthpiece to dry completely (overnight is recommended). This should be done once a week. If you need the inhaler before the mouthpiece is dry, shake off excess water, replace canister, and test spray 2 times (away from the face).  Warning  A steroid inhaler is used to prevent an asthma  attack. Do not use this to treat an acute wheezing episode. Use only bronchodilator inhalers (quick relief) to treat an acute asthma attack.  If you find that your medicine is not working and you need to use it more often than prescribed, this could be a sign that your asthma is getting worse. Go to the emergency room or urgent care right away. An asthma attack is easiest to treat in the early stages before it becomes severe.  When to seek medical advice  Get prompt medical attention if any of the following occur:  · Increased wheezing or shortness of breath  · Need to use your inhalers more often than usual without relief  · Fever of 100.4°F (38°C) or higher, or as directed by your healthcare provider  · Coughing up lots of dark-colored or bloody sputum (mucus)  · Chest pain with each breath  · Blue lips or fingernails  · Peak flow reading less than 50% of your normal best  Date Last Reviewed: 12/2/2015  © 0719-3483 [employer]. [address] [place] [address]. All rights reserved. This information is not intended as a substitute for professional medical care. Always follow your healthcare professional's instructions.        Step-by-Step  Using an Inhaler (in Mouth) Without a Spacer    Date Last Reviewed: 5/29/2015  © 0688-3997 [employer]. [address] [place] [address]. All rights reserved. This information is not intended as a substitute for professional medical care. Always follow your healthcare professional's instructions.        Step-by-Step  Using an Inhaler with a Spacer    Date Last Reviewed: 5/29/2015  © 2498-5891 [employer]. [address] [place] [address]. All rights reserved. This information is not intended as a substitute for professional medical care. Always follow your healthcare professional's instructions.             Language Assistance Services     ATTENTION: Language assistance services are available, free of charge.  Please call [phone].      ATENCIÓN: Si habla español, tiene a smith disposición servicios gratuitos de asistencia lingüística. Llame al [phone].     CHÚ Ý: N?u b?n nói Ti?ng Vi?t, có các d?ch v? h? tr? ngôn ng? mi?n phí dành cho b?n. G?i s? [phone].         [facility] complies with applicable Federal civil rights laws and does not discriminate on the basis of race, color, national origin, age, disability, or sex.

## 2017-04-05 NOTE — PROGRESS NOTES
History & Physical    SUBJECTIVE:     History of Present Illness:  Patient is a 46 y.o. female presents with  see me after hospital discharge when she was admitted with shortness of breath and asthma exacerbation.Asked to see me by Benito Vázquez NP  Accompanied by her daughter and son-in-law. No cough or wheezing, No hemoptysis. No petrochemical exposure.  she has no oxygen at home  Her echocardiogram during hospital stay was abnormal diastolic dysfunction will establish care with cardiology  Previously seen Dr. Deleon in [place]  Multiple medical problems.    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  Will last with all results  Multiple family members have asthma in the family  She has a nebulizer with albuterol.  She is currently taking prednisone  I'll start her on Symbicort  Will be given vortex spacer  She also has symptoms of obstructive sleep apnea snoring witnessed apnea  The sleep study will be obtained      Sully score 16    STOP - BANG Questionnaire:     1. Snoring : Do you snore loudly ?    Yes    2. Tired : Do you often feel tired, fatigued, or sleepy during daytime? Yes    3. Observed: Has anyone observed you stop breathing during your sleep?   Yes     4. Blood pressure : Do you have or are you being treated for high blood pressure?   Yes    5. BMI :BMI more than 35 kg/m2?   Yes    6. Age : Age over 50 yr old?   No    7. Neck circumference: Neck circumference greater than 40 cm?   Yes    8. Gender: Gender male?   No    High risk of GEN: Yes 5 - 8    References:   STOP Questionnaire   A Tool to Screen Patients for Obstructive Sleep Apnea: TINY Hicks., ANNE Smith.B.B.S., Jr Guo M.D.,Leanne Spangler, Ph.D., ANNE Mulligan.B.B.S.,_ ANNE Carbone.Sc.,_  Jhonny Cohn M.D., LUPE JohnstonC.P.C.; Anesthesiology 2008; 108:812-21 Copyright © 2008, the American Society of Anesthesiologists, Inc. Breanne Alessandro & Ban[employer], Inc.      Chief Complaint   Patient presents with    Asthma     rev ct       Review of patient's allergies indicates:  No Known Allergies    Current Outpatient Prescriptions   Medication Sig Dispense Refill    albuterol-ipratropium 2.5mg-0.5mg/3mL (DUO-NEB) 0.5 mg-3 mg(2.5 mg base)/3 mL nebulizer solution Take 3 mLs by nebulization every 4 (four) hours as needed for Wheezing. Rescue 24 vial 1    alprazolam (XANAX) 0.25 MG tablet Take 1 tablet (0.25 mg total) by mouth daily as needed for Anxiety. 90 tablet 0    cetirizine (ZYRTEC) 10 MG tablet Take 1 tablet (10 mg total) by mouth once daily. 30 tablet 1    duloxetine (CYMBALTA) 20 MG capsule Take 30 mg by mouth once daily.      prednisoLONE acetate (PRED FORTE) 1 % DrpS 1 drop 4 (four) times daily.      predniSONE (DELTASONE) 10 MG tablet 40mg po daily x 4 days, then 30mg PO daily x 3 days, 20mg po daily x 2 daily, 10mg po daily x 1 days 30 tablet 0    budesonide-formoterol 80-4.5 mcg (SYMBICORT) 80-4.5 mcg/actuation HFAA Inhale 2 puffs into the lungs 2 (two) times daily. 1 Inhaler 6     No current facility-administered medications for this visit.        Past Medical History:   Diagnosis Date    Asthma     Blindness     Sarcoidosis      History reviewed. No pertinent surgical history.  Family History   Problem Relation Age of Onset    Heart disease Father      Social History   Substance Use Topics    Smoking status: Never Smoker    Smokeless tobacco: None    Alcohol use No        Review of Systems:  Review of Systems   Constitutional: Positive for fatigue.   HENT: Negative.    Eyes: Negative.         Blind   Respiratory: Positive for apnea and shortness of breath.    Cardiovascular: Positive for leg swelling.   Gastrointestinal: Negative.    Endocrine: Negative.   "  Musculoskeletal: Negative.    Skin: Negative.    Allergic/Immunologic: Negative.    Neurological: Negative.    Hematological: Negative.    Psychiatric/Behavioral: Positive for sleep disturbance.       OBJECTIVE:     Vital Signs (Most Recent)  Pulse: 88 (04/05/17 1018)  BP: (!) 148/78 (04/05/17 1018)  5' 2" (1.575 m)  111.9 kg (246 lb 11.1 oz)     Physical Exam:  Physical Exam   Constitutional: She is oriented to person, place, and time. She appears well-developed and well-nourished. No distress.   HENT:   Head: Normocephalic and atraumatic.   Nose: Nose normal.   Mouth/Throat: Oropharynx is clear and moist. No oropharyngeal exudate.   Eyes: Conjunctivae and EOM are normal. Pupils are equal, round, and reactive to light. Left eye exhibits no discharge. No scleral icterus.   Neck: Normal range of motion. Neck supple. No JVD present. No tracheal deviation present. No thyromegaly present.   Cardiovascular: Normal rate, regular rhythm and normal heart sounds.    No murmur heard.  Pulmonary/Chest: Effort normal and breath sounds normal. No respiratory distress. She has no wheezes. She has no rales.   Abdominal: Soft. Bowel sounds are normal. She exhibits no distension and no mass. There is no tenderness.   Musculoskeletal: Normal range of motion. She exhibits edema.   Neurological: She is alert and oriented to person, place, and time. She has normal reflexes. No cranial nerve deficit. Coordination normal.   Skin: Skin is warm and dry. She is not diaphoretic. No erythema. No pallor.   Psychiatric: She has a normal mood and affect. Her behavior is normal.   Nursing note and vitals reviewed.      Laboratory  CBC: Reviewed  BMP: Reviewed  done 03/29/2017: anemia    Diagnostic Results:  CXR done in ER reviewed  Chest CT was reviewed  Parenchyma was clear.  No hilar adenopathy  No effusions    ASSESSMENT/PLAN:     Problem List Items Addressed This Visit     Moderate asthma - Primary (Chronic)     Oral prednisone  Added " Symbicort with vortex spacer  Rescue albuterol    Asthma score 12  +ve family Hx  No old spirometry results         Relevant Medications    budesonide-formoterol 80-4.5 mcg (SYMBICORT) 80-4.5 mcg/actuation HFAA    Other Relevant Orders    ANGIOTENSIN CONVERTING ENZYME    Complete PFT without bronchodilator - Clinic    Stress test, pulmonary    Polysomnogram (CPAP will be added if patient meets diagnostic criteria.)    Diastolic dysfunction (Chronic)     Fluid management and salt management  Appt for cardiology           Relevant Orders    Ambulatory Referral to Cardiology    Polysomnogram (CPAP will be added if patient meets diagnostic criteria.)    GEN (obstructive sleep apnea) (Chronic)     Daytime naps  WASO; X 6  Apnea  Snoring  Bed time 10 pm, wake time 10 am         Relevant Orders    Polysomnogram (CPAP will be added if patient meets diagnostic criteria.)    Uveitic glaucoma of both eyes (Chronic)     Blind for 3 years  Unclear if this is sarcoid, was told visual impairment related to sarcoid         Relevant Orders    Ambulatory Referral to Ophthalmology    Sarcoidosis     Per ophthalmology in [place]  No biopsy results         Morbid obesity     Discussed weight loss and exercise             PLAN:Plan      Return in about 8 weeks (around 5/31/2017) for release medical records,ref cards and eye, PFT, PSG, 6mwd, Ace,.    This note was prepared using voice recognition system and is likely to have sound alike errors that may have been overlooked even after proof reading.  Please call me with any questions    Discussed diagnosis, its evaluation, treatment and usual course. All questions answered.    Thank you for the courtesy of participating in the care of this patient:  Benito Mota MD

## 2017-04-05 NOTE — ASSESSMENT & PLAN NOTE
Oral prednisone  Added Symbicort with vortex spacer  Rescue albuterol    Asthma score 12  +ve family Hx  No old spirometry results

## 2017-04-07 ENCOUNTER — HOSPITAL ENCOUNTER (OUTPATIENT)
Dept: SLEEP MEDICINE | Facility: HOSPITAL | Age: 47
Discharge: HOME | End: 2017-04-07
Attending: INTERNAL MEDICINE
Payer: MEDICARE

## 2017-04-07 DIAGNOSIS — J45.909 MODERATE ASTHMA: Chronic | ICD-10-CM

## 2017-04-07 DIAGNOSIS — G47.33 OSA (OBSTRUCTIVE SLEEP APNEA): Chronic | ICD-10-CM

## 2017-04-07 DIAGNOSIS — I51.89 DIASTOLIC DYSFUNCTION: Chronic | ICD-10-CM

## 2017-04-07 PROCEDURE — 95810 POLYSOM 6/> YRS 4/> PARAM: CPT | Mod: 26,,, | Performed by: INTERNAL MEDICINE

## 2017-04-07 PROCEDURE — 95810 POLYSOM 6/> YRS 4/> PARAM: CPT

## 2017-04-07 NOTE — Clinical Note
SUMMARY STATEMENTS: 1. Findings related to sleep diagnoses: h Moderate snoring recorded. h Apnea-hypopnea index was 23.3 events per hour total events 168. h Oxygen saturation was below 88% for 12 minutes 6 seconds. Lowest oxygen saturation was 81%. h REM sleep AHI was 66.6 events per hour. 2. EEG abnormalities: h Sleep latency was normal. Sleep efficiency was high. REM latency was normal. Sleep stage distribution slow-wave sleep and REM stage sleep was normal. Arousal index was mild 17.9 events per hour. 3. ECG abnormalities: h Average heart rate was 80 bpm with a maximal heart rate of 120 bpm. INTERPRETATION: 1. Moderate to severe obstructive sleep apnea. 2. Significant hypoxemia associated with sleep disorder breathing RECOMMENDATIONS: 1. Treatment with CPAP/BiPAP is indicated and warranted.

## 2017-04-10 ENCOUNTER — OFFICE VISIT (OUTPATIENT)
Dept: CARDIOLOGY | Facility: CLINIC | Age: 47
End: 2017-04-10
Payer: MEDICARE

## 2017-04-10 VITALS
HEIGHT: 62 IN | SYSTOLIC BLOOD PRESSURE: 112 MMHG | HEART RATE: 85 BPM | DIASTOLIC BLOOD PRESSURE: 80 MMHG | BODY MASS INDEX: 45.1 KG/M2 | WEIGHT: 245.06 LBS

## 2017-04-10 DIAGNOSIS — E66.01 MORBID OBESITY DUE TO EXCESS CALORIES: ICD-10-CM

## 2017-04-10 DIAGNOSIS — R06.02 SOB (SHORTNESS OF BREATH): ICD-10-CM

## 2017-04-10 DIAGNOSIS — G47.33 OSA (OBSTRUCTIVE SLEEP APNEA): Primary | Chronic | ICD-10-CM

## 2017-04-10 DIAGNOSIS — R00.2 PALPITATION: ICD-10-CM

## 2017-04-10 DIAGNOSIS — H54.8 LEGAL BLINDNESS: ICD-10-CM

## 2017-04-10 DIAGNOSIS — R06.09 DOE (DYSPNEA ON EXERTION): Primary | ICD-10-CM

## 2017-04-10 DIAGNOSIS — D86.9 SARCOIDOSIS: ICD-10-CM

## 2017-04-10 PROCEDURE — 93005 ELECTROCARDIOGRAM TRACING: CPT | Mod: PBBFAC | Performed by: NUCLEAR MEDICINE

## 2017-04-10 PROCEDURE — 99212 OFFICE O/P EST SF 10 MIN: CPT | Mod: PBBFAC | Performed by: INTERNAL MEDICINE

## 2017-04-10 PROCEDURE — 99204 OFFICE O/P NEW MOD 45 MIN: CPT | Mod: S$PBB,,, | Performed by: INTERNAL MEDICINE

## 2017-04-10 PROCEDURE — 99999 PR PBB SHADOW E&M-EST. PATIENT-LVL II: CPT | Mod: PBBFAC,,, | Performed by: INTERNAL MEDICINE

## 2017-04-10 PROCEDURE — 93010 ELECTROCARDIOGRAM REPORT: CPT | Mod: S$PBB,,, | Performed by: NUCLEAR MEDICINE

## 2017-04-10 NOTE — PROGRESS NOTES
Subjective:   Patient ID:  Jennifer Mcclain is a 46 y.o. female who presents for evaluation of Shortness of Breath and Chest Pain      HPI Comments: 47 yo female, for [facility] [place].  PMH Uveittis sarcodosis, legal blind, asthma and pulm sarcodosis, f/ uat [facility] before, moved to  recently.  DENNIS for a yr, stable, and can walk about 25 yards before stop to catch the breathing,  Recently wad admitted to [facility] for asthma.  No chest pain, dizziness, and orthopnea.  Some fluttering sensation.  EKG NSR  03/2017, ECHO normal EF, LVH and DD.      Past Medical History:   Diagnosis Date    Asthma     Blindness     Sarcoidosis        History reviewed. No pertinent surgical history.    Social History   Substance Use Topics    Smoking status: Never Smoker    Smokeless tobacco: None    Alcohol use No       Family History   Problem Relation Age of Onset    Heart disease Father        Review of Systems   Constitution: Negative for decreased appetite, diaphoresis, fever, weakness, malaise/fatigue and night sweats.   HENT: Negative for headaches and nosebleeds.    Eyes: Negative for blurred vision and double vision.   Cardiovascular: Positive for dyspnea on exertion. Negative for chest pain, claudication, irregular heartbeat, leg swelling, near-syncope, orthopnea, palpitations, paroxysmal nocturnal dyspnea and syncope.   Respiratory: Positive for shortness of breath. Negative for cough, sleep disturbances due to breathing, snoring, sputum production and wheezing.    Endocrine: Negative for cold intolerance and polyuria.   Hematologic/Lymphatic: Does not bruise/bleed easily.   Skin: Negative for rash.   Musculoskeletal: Negative for back pain, falls, joint pain, joint swelling and neck pain.   Gastrointestinal: Negative for abdominal pain, heartburn, nausea and vomiting.   Genitourinary: Negative for dysuria, frequency and hematuria.   Neurological: Negative for difficulty with concentration, dizziness, focal weakness,  light-headedness, numbness and seizures.   Psychiatric/Behavioral: Negative for depression, memory loss and substance abuse. The patient does not have insomnia.    Allergic/Immunologic: Negative for HIV exposure and hives.       Objective:   Physical Exam   Constitutional: She is oriented to person, place, and time. She appears well-nourished.   HENT:   Head: Normocephalic.   Eyes: Pupils are equal, round, and reactive to light.   Neck: Normal carotid pulses and no JVD present. Carotid bruit is not present. No thyromegaly present.   Cardiovascular: Normal rate, regular rhythm, normal heart sounds and normal pulses.   No extrasystoles are present. PMI is not displaced.  Exam reveals no gallop and no S3.    No murmur heard.  Pulmonary/Chest: Breath sounds normal. No stridor. No respiratory distress.   Abdominal: Soft. Bowel sounds are normal. There is no tenderness. There is no rebound.   Musculoskeletal: Normal range of motion.   Neurological: She is alert and oriented to person, place, and time.   Skin: Skin is intact. No rash noted.   Psychiatric: Her behavior is normal.       No results found for: CHOL  No results found for: HDL  No results found for: LDLCALC  No results found for: TRIG  No results found for: CHOLHDL    Chemistry        Component Value Date/Time     03/29/2017 0530    K 3.6 03/29/2017 0530     03/29/2017 0530    CO2 24 03/29/2017 0530    BUN 12 03/29/2017 0530    CREATININE 0.8 03/29/2017 0530    GLU 85 03/29/2017 0530        Component Value Date/Time    CALCIUM 8.8 03/29/2017 0530    ALKPHOS 64 03/26/2017 0102    AST 26 03/26/2017 0102    ALT 22 03/26/2017 0102    BILITOT 0.5 03/26/2017 0102          No results found for: TSH  No results found for: INR, PROTIME  Lab Results   Component Value Date    WBC 7.41 03/29/2017    HGB 11.9 (L) 03/29/2017    HCT 36.4 (L) 03/29/2017    MCV 86 03/29/2017     03/29/2017     BNP  @LABRCNTIP(BNP,BNPTRIAGEBLO)@  Estimated Creatinine Clearance:  101.1 mL/min (based on Cr of 0.8).     Assessment:      1. DENNIS (dyspnea on exertion)    2. Palpitation    3. 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 Morbid obesity due to excess calories    6. SOB (shortness of breath)      SOB multifactorial,. Asthma, GEN and pulm. Sarcoidosi. DD grade I, mild.  Plan:   Lipid penal and BNP  HOlter  Continue current meds.  Recommend heart-healthy diet, weight control and regular exercise.  Terry. Risk modification.   RTC in 6 months    I have reviewed all pertinent labs and cardiac studies. Plans and recommendations have been formulated under my direct supervision. All questions answered and patient voiced understanding. Patient to continue current medications.

## 2017-04-10 NOTE — LETTER
April 10, 2017      Mike Mota MD  [address]  [facility] [address]           [facility]  [id number] [place] [address]  Phone: [phone]  Fax: [phone]          Patient: Jennifer Mcclain   MR Number: [id number]   YOB: 1970   Date of Visit: 4/10/2017       Dear Dr. Mike Mota:    Thank you for referring Jennifer Mcclain to me for evaluation. Attached you will find relevant portions of my assessment and plan of care.    If you have questions, please do not hesitate to call me. I look forward to following Jennifer Mcclain along with you.    Sincerely,    Ananth Espana MD    Enclosure  CC:  No Recipients    If you would like to receive this communication electronically, please contact [email or web address] or [phone] to request more information on [email or web address] Link access.    For providers and/or their staff who would like to refer a patient to [facility], please contact us through our one-stop-shop provider referral line, [facility] , at [phone].    If you feel you have received this communication in error or would no longer like to receive these types of communications, please e-mail [email or web address]

## 2017-04-10 NOTE — MR AVS SNAPSHOT
[facility]  [address] [address]  Phone: [phone]  Fax: [phone]                  Jennifer Mcclain   4/10/2017 2:00 PM   Office Visit    Description:  Female : 1970   Provider:  Ananth Espana MD   Department:  [facility]           Reason for Visit     Shortness of Breath     Chest Pain           Diagnoses this Visit        Comments    DENNIS (dyspnea on exertion)    -  Primary     Palpitation         Sarcoidosis         Legal blindness         Morbid obesity due to excess calories         SOB (shortness of breath)                To Do List           Future Appointments        Provider Department Dept Phone    2017 9:20 AM Rochelle Faustin NP [facility] - Pulmonary Services [phone]    2017 1:30 PM Bran You OD [facility] - Ophthalmology [phone]    2017 2:30 PM LABORATORY, [address] [facility] [phone]    2017 2:40 PM PULMONARY LAB, [facility] - Pulm Function [facility] [phone]    2017 3:20 PM [facility] [phone]      Goals (5 Years of Data)     None      Follow-Up and Disposition     Return in about 6 months (around 10/10/2017).      [facility]     [facility] Nurse Care Line - 24/ Assistance  Unless otherwise directed by your provider, please contact [facility], our nurse care line that is available for / assistance.     Registered nurses in the [facility] provide: appointment scheduling, clinical advisement, health education, and other advisory services.  Call: [phone] (toll free)               Medications           Message regarding Medications     Verify the changes and/or additions to your medication regime listed below are the same as discussed with your clinician today.  If any of these changes or additions are incorrect, please notify your healthcare provider.        STOP taking these medications      "budesonide-formoterol 80-4.5 mcg (SYMBICORT) 80-4.5 mcg/actuation HFAA Inhale 2 puffs into the lungs 2 (two) times daily.           Verify that the below list of medications is an accurate representation of the medications you are currently taking.  If none reported, the list may be blank. If incorrect, please contact your healthcare provider. Carry this list with you in case of emergency.           Current Medications     albuterol-ipratropium 2.5mg-0.5mg/3mL (DUO-NEB) 0.5 mg-3 mg(2.5 mg base)/3 mL nebulizer solution Take 3 mLs by nebulization every 4 (four) hours as needed for Wheezing. Rescue    alprazolam (XANAX) 0.25 MG tablet Take 1 tablet (0.25 mg total) by mouth daily as needed for Anxiety.    cetirizine (ZYRTEC) 10 MG tablet Take 1 tablet (10 mg total) by mouth once daily.    duloxetine (CYMBALTA) 20 MG capsule Take 30 mg by mouth once daily.    prednisoLONE acetate (PRED FORTE) 1 % DrpS 1 drop 4 (four) times daily.    predniSONE (DELTASONE) 10 MG tablet 40mg po daily x 4 days, then 30mg PO daily x 3 days, 20mg po daily x 2 daily, 10mg po daily x 1 days           Clinical Reference Information           Your Vitals Were     BP Pulse Height Weight BMI    112/80 (BP Location: Left arm) 85 5' 2" (1.575 m) 111.1 kg (245 lb 0.7 oz) 44.82 kg/m2      Blood Pressure          Most Recent Value    BP  112/80      Allergies as of 4/10/2017     No Known Allergies      Immunizations Administered on Date of Encounter - 4/10/2017     None      Orders Placed During Today's Visit      Normal Orders This Visit    IN OFFICE EKG 12-LEAD (to Babb)     Future Labs/Procedures Expected by Expires    Brain natriuretic peptide  4/10/2017 (Approximate) 4/10/2018    Lipid panel  4/10/2017 6/9/2018    Holter monitor - 48 hour  As directed 4/10/2018      [facility] Sign-Up     Activating your [facility] account is as easy as 1-2-3!     1) Visit [email or web address], select Sign Up Now, enter this activation code and your date of birth, then " select Next.  [email or web address]  Expires: 5/13/2017  1:13 PM      2) Create a username and password to use when you visit [facility] in the future and select a security question in case you lose your password and select Next.    3) Enter your e-mail address and click Sign Up!    Additional Information  If you have questions, please e-mail [email or web address] or call [phone] to talk to our [email or web address] staff. Remember, [facility] is NOT to be used for urgent needs. For medical emergencies, dial 911.         Language Assistance Services     ATTENTION: Language assistance services are available, free of charge. Please call [phone].      ATENCIÓN: Si adrila isabella, tiene a smith disposición servicios gratuitos de asistencia lingüística. Llame al [phone].     CHÚ Ý: N?u b?n nói Ti?ng Vi?t, có các d?ch v? h? tr? ngôn ng? mi?n phí dành cho b?n. G?i s? [phone].         [facility] complies with applicable Federal civil rights laws and does not discriminate on the basis of race, color, national origin, age, disability, or sex.

## 2017-04-11 NOTE — PROGRESS NOTES
SUMMARY STATEMENTS:  1. Findings related to sleep diagnoses:   Moderate snoring recorded.   Apnea-hypopnea index was 23.3 events per hour total events 168.   Oxygen saturation was below 88% for 12 minutes 6 seconds. Lowest oxygen saturation was 81%.   REM sleep AHI was 66.6 events per hour.  2. EEG abnormalities:   Sleep latency was normal. Sleep efficiency was high. REM latency was normal. Sleep stage distribution  slow-wave sleep and REM stage sleep was normal. Arousal index was mild 17.9 events per hour.  3. ECG abnormalities:   Average heart rate was 80 bpm with a maximal heart rate of 120 bpm.  INTERPRETATION:  1. Moderate to severe obstructive sleep apnea.  2. Significant hypoxemia associated with sleep disorder breathing  RECOMMENDATIONS:  1. Treatment with CPAP/BiPAP is indicated and warranted.

## 2017-04-11 NOTE — PROCEDURES
"SUMMARY STATEMENTS:  1. Findings related to sleep diagnoses:   Moderate snoring recorded.   Apnea-hypopnea index was 23.3 events per hour total events 168.   Oxygen saturation was below 88% for 12 minutes 6 seconds. Lowest oxygen saturation was 81%.   REM sleep AHI was 66.6 events per hour.  2. EEG abnormalities:   Sleep latency was normal. Sleep efficiency was high. REM latency was normal. Sleep stage distribution  slow-wave sleep and REM stage sleep was normal. Arousal index was mild 17.9 events per hour.  3. ECG abnormalities:   Average heart rate was 80 bpm with a maximal heart rate of 120 bpm.  INTERPRETATION:  1. Moderate to severe obstructive sleep apnea.  2. Significant hypoxemia associated with sleep disorder breathing  RECOMMENDATIONS:  1. Treatment with CPAP/BiPAP is indicated and warrant    See imported Sleep Study result in "Chart Review" under the   "Media tab".      (This Sleep Study was interpreted by a Board Certified Sleep   Specialist who conducted an epoch-by-epoch review of the entire   raw data recording.)     (The indication for this sleep study was reviewed and deemed   appropriate by AASM Practice Parameters or other reasons by a   Board Certified Sleep Specialist.)    Mike Mota MD    "

## 2017-04-17 ENCOUNTER — HOSPITAL ENCOUNTER (OUTPATIENT)
Dept: SLEEP MEDICINE | Facility: HOSPITAL | Age: 47
Discharge: HOME | End: 2017-04-17
Attending: INTERNAL MEDICINE
Payer: MEDICARE

## 2017-04-17 DIAGNOSIS — G47.33 OSA (OBSTRUCTIVE SLEEP APNEA): Chronic | ICD-10-CM

## 2017-04-17 PROCEDURE — 95811 POLYSOM 6/>YRS CPAP 4/> PARM: CPT | Mod: 26,,, | Performed by: INTERNAL MEDICINE

## 2017-04-17 PROCEDURE — 95811 POLYSOM 6/>YRS CPAP 4/> PARM: CPT

## 2017-04-17 NOTE — Clinical Note
"STUDY PARAMETERS: The study was performed with a sleep technologist in attendance for the entire test period.  Video monitoring was carried out throughout the study, and the following clinical parameters were recorded:  SUMMARY STATEMENTS: 1. Findings related to sleep diagnoses: " Adequate CPAP titration snoring eliminated. " This was a full night CPAP titration study.  CPAP was initiated at 4.0cm.  C-flex (set to 3) and heated humidification were used throughout.  Maximal pressure tested with CPAP 12 cm.  CPAP 10 cm was tested well with REM supine sleep. " Mask leak was reviewed. 2. EEG abnormalities: " Sleep efficiency was poor due to highest wake after sleep onset.  Sleep latency was delayed 53.3 minutes. " Sleep architecture was normal.  23.6% slow-wave sleep and 19.8% REM stage sleep.  Arousal index was mild. " No periodic leg movements. 3. ECG abnormalities: " Average heart rate was 92 bpm and a maximal heart rate of 110 bpm.  INTERPRETATION:   1. Adequate CPAP titration 2. CPAP 10 cm w"

## 2017-04-18 ENCOUNTER — LAB VISIT (OUTPATIENT)
Dept: LAB | Facility: HOSPITAL | Age: 47
End: 2017-04-18
Attending: INTERNAL MEDICINE
Payer: MEDICARE

## 2017-04-18 ENCOUNTER — OFFICE VISIT (OUTPATIENT)
Dept: OPHTHALMOLOGY | Facility: CLINIC | Age: 47
End: 2017-04-18
Payer: MEDICARE

## 2017-04-18 ENCOUNTER — CLINICAL SUPPORT (OUTPATIENT)
Dept: CARDIOLOGY | Facility: CLINIC | Age: 47
End: 2017-04-18
Payer: MEDICARE

## 2017-04-18 DIAGNOSIS — R06.09 DOE (DYSPNEA ON EXERTION): ICD-10-CM

## 2017-04-18 DIAGNOSIS — H54.8 LEGAL BLINDNESS: ICD-10-CM

## 2017-04-18 DIAGNOSIS — H40.43X3 UVEITIC GLAUCOMA OF BOTH EYES, SEVERE STAGE: Chronic | ICD-10-CM

## 2017-04-18 DIAGNOSIS — R00.2 PALPITATION: ICD-10-CM

## 2017-04-18 DIAGNOSIS — H20.9 UVEITIC GLAUCOMA OF BOTH EYES, SEVERE STAGE: Chronic | ICD-10-CM

## 2017-04-18 DIAGNOSIS — D86.83 SARCOID UVEITIS OF BOTH EYES: Primary | ICD-10-CM

## 2017-04-18 DIAGNOSIS — E66.01 MORBID OBESITY DUE TO EXCESS CALORIES: ICD-10-CM

## 2017-04-18 LAB
BNP SERPL-MCNC: <10 PG/ML
CHOLEST/HDLC SERPL: 3.5 {RATIO}
HDL/CHOLESTEROL RATIO: 28.2 %
HDLC SERPL-MCNC: 209 MG/DL
HDLC SERPL-MCNC: 59 MG/DL
LDLC SERPL CALC-MCNC: 135.8 MG/DL
NONHDLC SERPL-MCNC: 150 MG/DL
TRIGL SERPL-MCNC: 71 MG/DL

## 2017-04-18 PROCEDURE — 92002 INTRM OPH EXAM NEW PATIENT: CPT | Mod: S$PBB,,, | Performed by: OPTOMETRIST

## 2017-04-18 PROCEDURE — 99211 OFF/OP EST MAY X REQ PHY/QHP: CPT | Mod: PBBFAC | Performed by: OPTOMETRIST

## 2017-04-18 PROCEDURE — 99999 PR PBB SHADOW E&M-EST. PATIENT-LVL I: CPT | Mod: PBBFAC,,, | Performed by: OPTOMETRIST

## 2017-04-18 PROCEDURE — 93227 XTRNL ECG REC<48 HR R&I: CPT | Mod: S$PBB,,, | Performed by: NUCLEAR MEDICINE

## 2017-04-18 RX ORDER — LATANOPROST 50 UG/ML
1 SOLUTION/ DROPS OPHTHALMIC NIGHTLY
Qty: 2.5 ML | Refills: 11 | Status: SHIPPED | OUTPATIENT
Start: 2017-04-18 | End: 2017-10-09

## 2017-04-18 RX ORDER — ATROPINE SULFATE 10 MG/ML
1 SOLUTION/ DROPS OPHTHALMIC 2 TIMES DAILY
Qty: 1 BOTTLE | Refills: 3 | Status: SHIPPED | OUTPATIENT
Start: 2017-04-18 | End: 2017-05-08 | Stop reason: SDUPTHER

## 2017-04-18 NOTE — PROGRESS NOTES
HPI     Referral to [facility] from Dr. Mota for glaucoma check. Patient states she   is unable to see x 3 years.  Patient states IOP has been as high as 60 in   both eyes. New patient last eye exam 1 year in [place]. Left eye has mucus   in it for a while.  Simbrinza bid OU  Pred Forte OS qid       Last edited by Bran You, OD on 4/18/2017  2:24 PM.         Assessment /Plan     For exam results, see Encounter Report.    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  Dispense: 1 Bottle; Refill: 3  -     latanoprost (XALATAN) 0.005 % ophthalmic solution; Place 1 drop into both eyes every evening.  Dispense: 2.5 mL; Refill: 11      Start Atropine bid OU  Start latanoprost hs OU  Continue Simbrinza bid OU  Increased Pred Forte to qid OU    Consult [facility]GM for flare up of sacroid uveitis.

## 2017-04-18 NOTE — LETTER
April 18, 2017      Mike Mota MD  [address]  [facility] [address]           [facility]  [address] [place] [address]  Phone: [phone]  Fax: [phone]          Patient: Jennifer Mcclain   MR Number: [id number]   YOB: 1970   Date of Visit: 4/18/2017       Dear Dr. Mike Mota:    Thank you for referring Jennifer Mcclain to me for evaluation. Attached you will find relevant portions of my assessment and plan of care.    If you have questions, please do not hesitate to call me. I look forward to following Jennifer Mcclain along with you.    Sincerely,    Bran You, OD    Enclosure  CC:  No Recipients    If you would like to receive this communication electronically, please contact [email or web address] or [phone] to request more information on [facility] access.    For providers and/or their staff who would like to refer a patient to [facility], please contact us through our one-stop-shop provider referral line, Dahlia Tai, at [phone].    If you feel you have received this communication in error or would no longer like to receive these types of communications, please e-mail [email or web address]

## 2017-04-19 ENCOUNTER — TELEPHONE (OUTPATIENT)
Dept: OPHTHALMOLOGY | Facility: CLINIC | Age: 47
End: 2017-04-19

## 2017-04-19 ENCOUNTER — TELEPHONE (OUTPATIENT)
Dept: CARDIOLOGY | Facility: CLINIC | Age: 47
End: 2017-04-19

## 2017-04-19 NOTE — TELEPHONE ENCOUNTER
----- Message from Deja Mcclain sent at 4/19/2017  3:31 PM CDT -----  [facility]  Call [facility] at [phone]//regarding a rx that we been trying order but the manifactories doesn't supplies it need another rx//thks ht

## 2017-04-19 NOTE — TELEPHONE ENCOUNTER
----- Message from Ananth Espana MD sent at 4/18/2017  7:27 PM CDT -----  Pls. call the pt that  Blood work showed lipid level WNL and no CHF.  Dyspnea is related to sarcoidosis.    F/u as scheduled.    Karla.    Ananth

## 2017-04-21 NOTE — PROCEDURES
"STUDY PARAMETERS: The study was performed with a sleep technologist in attendance for the entire test period.  Video monitoring was carried out throughout the study, and the following clinical parameters were recorded:    SUMMARY STATEMENTS:  1. Findings related to sleep diagnoses:   Adequate CPAP titration snoring eliminated.   This was a full night CPAP titration study.  CPAP was initiated at 4.0cm.  C-flex (set to 3) and heated humidification were used throughout.  Maximal pressure tested with CPAP 12 cm.  CPAP 10 cm was tested well with REM supine sleep.   Mask leak was reviewed.  2. EEG abnormalities:   Sleep efficiency was poor due to highest wake after sleep onset.  Sleep latency was delayed 53.3 minutes.   Sleep architecture was normal.  23.6% slow-wave sleep and 19.8% REM stage sleep.  Arousal index was mild.   No periodic leg movements.  3. ECG abnormalities:   Average heart rate was 92 bpm and a maximal heart rate of 110 bpm.    INTERPRETATION:     1. Adequate CPAP titration  2. CPAP 10 cm water pressure was appropriate  3. Morbid obesity based on BMI    RECOMMENDATIONS:     1. CPAP 10 cm water pressure with a ResMed wisp nasal large mask.  Chin strap may be considered.  2. Weight loss exercise          See imported Sleep Study result in "Chart Review" under the   "Media tab".      (This Sleep Study was interpreted by a Board Certified Sleep   Specialist who conducted an epoch-by-epoch review of the entire   raw data recording.)     (The indication for this sleep study was reviewed and deemed   appropriate by AASM Practice Parameters or other reasons by a   Board Certified Sleep Specialist.)    Mike Mota MD      "

## 2017-04-28 ENCOUNTER — OFFICE VISIT (OUTPATIENT)
Dept: PULMONOLOGY | Facility: CLINIC | Age: 47
End: 2017-04-28
Payer: MEDICARE

## 2017-04-28 VITALS
SYSTOLIC BLOOD PRESSURE: 120 MMHG | RESPIRATION RATE: 18 BRPM | HEART RATE: 93 BPM | OXYGEN SATURATION: 98 % | WEIGHT: 245.81 LBS | DIASTOLIC BLOOD PRESSURE: 83 MMHG | HEIGHT: 62 IN | BODY MASS INDEX: 45.24 KG/M2

## 2017-04-28 DIAGNOSIS — G47.33 MODERATE OBSTRUCTIVE SLEEP APNEA: Primary | Chronic | ICD-10-CM

## 2017-04-28 DIAGNOSIS — J45.909 MODERATE ASTHMA: ICD-10-CM

## 2017-04-28 PROCEDURE — 99214 OFFICE O/P EST MOD 30 MIN: CPT | Mod: PBBFAC | Performed by: NURSE PRACTITIONER

## 2017-04-28 PROCEDURE — 99213 OFFICE O/P EST LOW 20 MIN: CPT | Mod: S$PBB,,, | Performed by: NURSE PRACTITIONER

## 2017-04-28 PROCEDURE — 99999 PR PBB SHADOW E&M-EST. PATIENT-LVL IV: CPT | Mod: PBBFAC,,, | Performed by: NURSE PRACTITIONER

## 2017-04-28 NOTE — PROGRESS NOTES
Subjective:      Patient ID: Jennifer Mcclain is a 46 y.o. female.    I have reviewed the patient's medical history in detail and updated the computerized patient record.    Problem list has been reviewed.    she has been referred by No ref. provider found for evaluation and management for   Chief Complaint   Patient presents with    Sleep Apnea       Chief Complaint: Sleep Apnea      HPI:  Patient reports to pulmonary clinic for review of sleep study done 4/7/2017 that revealed AHI 23.3.   CPAP titration 4/17/2017  revealed CPAP 10 cm optimal.  Pt reports she tolerated the nasal CPAP mask well for titration study.     Answers for HPI/ROS submitted by the patient on 4/28/2017   Asthma  In the past 4 weeks, how much of the time did your asthma keep you from getting as much done at work, school, or at home?: some of the time  During the past 4 weeks, how often have you had shortness of breath?: more than once a day  During the past 4 weeks, how often did your asthma symptoms (Wheezing, coughing, shortness of breath, chest tightness or pain) wake you up at night or earlier that usual in the morning?: 2 or 3 nights a week  During the past 4 weeks, how often have you used your rescue inhaler or nebulizer medication (such as albuterol)?: 3 or more times per day  How would you rate your asthma control during the past 4 weeks?: well controlled   : 11  Patient reports at this visit she is not having wheezing, but continues to use her neb 2-3 times a day, some days none.  Her main complaint is fatigue during day and lack of energy and sometimes shortness of breath with making her bed.  Reports she sits all day long with no exercise. She is up every now and then to walk around house.  Encouraged up every hour to move around.  Keep appointment with dr moy in June 2017 for reevaluation.    Previous Report Reviewed: office notes     Past Medical History: The following portions of the patient's history were reviewed and  updated as appropriate:   She  has a past surgical history that includes Laser SX (Right).  Her family history includes Heart disease in her father; Hypertension in her maternal grandmother.  She  reports that she has never smoked. She does not have any smokeless tobacco history on file. She reports that she does not drink alcohol or use illicit drugs.  She has a current medication list which includes the following prescription(s): albuterol-ipratropium 2.5mg-0.5mg/3ml, atropine 1%, cetirizine, duloxetine, latanoprost, prednisolone acetate, prednisone, and alprazolam.  She has No Known Allergies..    Review of Systems   Constitutional: Negative for fever, chills, weight loss, weight gain, activity change, appetite change, fatigue and night sweats.   HENT: Negative for postnasal drip, rhinorrhea, sinus pressure, voice change and congestion.    Eyes: Negative for redness and itching.   Respiratory: Negative for snoring, cough, sputum production, chest tightness, shortness of breath, wheezing, orthopnea, asthma nighttime symptoms, dyspnea on extertion, use of rescue inhaler and somnolence.    Cardiovascular: Negative for chest pain, palpitations and leg swelling.   Genitourinary: Negative for difficulty urinating and hematuria.   Endocrine: Negative for polydipsia, polyphagia, cold intolerance, heat intolerance and polyuria.    Musculoskeletal: Negative for arthralgias, back pain, gait problem, joint swelling and myalgias.   Skin: Negative.    Gastrointestinal: Negative for nausea, vomiting, abdominal pain and acid reflux.   Neurological: Negative for dizziness, weakness, light-headedness and headaches.   Hematological: Negative for adenopathy. No excessive bruising.   Psychiatric/Behavioral: Negative for sleep disturbance. The patient is not nervous/anxious.         Objective:     Physical Exam   Constitutional: She is oriented to person, place, and time. She appears well-developed and well-nourished.   HENT:   Head:  "Normocephalic.   Right Ear: External ear normal.   Left Ear: External ear normal.   Nose: Nose normal.   Mouth/Throat: Oropharynx is clear and moist. No oropharyngeal exudate.   Eyes: Conjunctivae are normal.   Cardiovascular: Normal rate, regular rhythm, normal heart sounds and intact distal pulses.    Pulmonary/Chest: Effort normal and breath sounds normal. No stridor. She has no wheezes. She has no rales.   Abdominal: Soft.   Musculoskeletal: Normal range of motion. She exhibits edema ( 1+ right lower leg. trace left lower leg).   Neurological: She is alert and oriented to person, place, and time.   Skin: Skin is warm and dry.   Psychiatric: She has a normal mood and affect. Her behavior is normal. Judgment and thought content normal.   Vitals reviewed.    Vitals:    04/28/17 0857   BP: 120/83   Pulse: 93   Resp: 18   SpO2: 98%   Weight: 111.5 kg (245 lb 13 oz)   Height: 5' 2" (1.575 m)   PainSc: 0-No pain     Estimated body mass index is 44.96 kg/(m^2) as calculated from the following:    Height as of this encounter: 5' 2" (1.575 m).    Weight as of this encounter: 111.5 kg (245 lb 13 oz).    Personal Diagnostic Review  Sleep study done 4/7/2017 that revealed AHI 23.3.     CPAP titration 4/17/2017  revealed CPAP 10 cm optimal.    Assessment:     1. Moderate obstructive sleep apnea    2. Moderate asthma      Orders Placed This Encounter   Procedures    CPAP FOR HOME USE     Initial order for CPAP     Order Specific Question:   Type:     Answer:   CPAP     Order Specific Question:   CPAP setting (cmH20):     Answer:   10     Order Specific Question:   Length of need (1-99 months):     Answer:   99     Order Specific Question:   Humidification:     Answer:   Heated     Order Specific Question:   Type of mask:     Answer:   Nasal     Comments:   or pt preference     Order Specific Question:   Headgear?     Answer:   Yes     Order Specific Question:   Tubing?     Answer:   Yes     Order Specific Question:   " "Humidifier chamber?     Answer:   Yes     Order Specific Question:   Chin strap?     Answer:   Yes     Order Specific Question:   Filters?     Answer:   Yes    CPAP/BIPAP SUPPLIES     90 day supply. 4 refills.     Order Specific Question:   Height:     Answer:   5' 2" (1.575 m)     Order Specific Question:   Weight:     Answer:   111.5 kg (245 lb 13 oz)     Order Specific Question:   Does patient have medical equipment at home?     Answer:   CPAP     Order Specific Question:   Type of mask:     Answer:   FFM     Comments:   Patient preference     Order Specific Question:   Tubing?     Answer:   Yes     Order Specific Question:   Humidifier chamber?     Answer:   Yes     Order Specific Question:   Chin strap?     Answer:   Yes     Order Specific Question:   Filters?     Answer:   Yes     Order Specific Question:   Length of need (1-99 months):     Answer:   99     Plan:     Discussed diagnosis, its evaluation, treatment and usual course. All questions answered.    Moderate obstructive sleep apnea  Comments:  original order to begin treatment CPAP 10 cm   Orders:  -     CPAP FOR HOME USE  -     CPAP/BIPAP SUPPLIES    Moderate asthma  Comments:  stable keep fu appt scheduled for june 2017.       Reviewed therapeutic goals for positive airway pressure therapy CPAP  Ideal is usage 100% of nights for 6 - 8 hours per night. Minimum usage is 70% of night for at least 4 hours per night used. Pateint expressed understanding.     Return in about 6 weeks (around 6/9/2017) for CPAP compliance follow up.     "

## 2017-04-28 NOTE — MR AVS SNAPSHOT
[facility]  [address] [facility] [address]  Phone: [phone]  Fax: [phone]                  Jennifer Mcclain   2017 8:40 AM   Office Visit    Description:  Female : 1970   Provider:  Rochelle Faustin NP   Department:  [facility]           Reason for Visit     Sleep Apnea           Diagnoses this Visit        Comments    Moderate obstructive sleep apnea    -  Primary original order to begin treatment CPAP 10 cm            To Do List           Future Appointments        Provider Department Dept Phone    2017 9:00 AM Ulisses Omer MD [facility] [phone]    2017 9:00 AM Rochelle Faustin NP [facility] [phone]    2017 2:30 PM LABORATORY, [address] [facility] [phone]    2017 2:40 PM PULMONARY LAB, [facility] [phone]    2017 3:20 PM PULMONARY LAB, [facility] [phone]      Goals (5 Years of Data)     None      Follow-Up and Disposition     Return in about 6 weeks (around 2017) for CPAP compliance follow up.      [facility]     [facility] Nurse Care Line - 24/7 Assistance  Unless otherwise directed by your provider, please contact [facility], our nurse care line that is available for 24/7 assistance.     Registered nurses in the [facility] provide: appointment scheduling, clinical advisement, health education, and other advisory services.  Call: [phone] (toll free)               Medications           Message regarding Medications     Verify the changes and/or additions to your medication regime listed below are the same as discussed with your clinician today.  If any of these changes or additions are incorrect, please notify your healthcare provider.             Verify that the below list of medications is an accurate representation of the medications you are  "currently taking.  If none reported, the list may be blank. If incorrect, please contact your healthcare provider. Carry this list with you in case of emergency.           Current Medications     albuterol-ipratropium 2.5mg-0.5mg/3mL (DUO-NEB) 0.5 mg-3 mg(2.5 mg base)/3 mL nebulizer solution Take 3 mLs by nebulization every 4 (four) hours as needed for Wheezing. Rescue    atropine 1% (ISOPTO ATROPINE) 1 % Drop Place 1 drop into both eyes 2 (two) times daily.    cetirizine (ZYRTEC) 10 MG tablet Take 1 tablet (10 mg total) by mouth once daily.    duloxetine (CYMBALTA) 20 MG capsule Take 30 mg by mouth once daily.    latanoprost (XALATAN) 0.005 % ophthalmic solution Place 1 drop into both eyes every evening.    prednisoLONE acetate (PRED FORTE) 1 % DrpS 1 drop 4 (four) times daily.    predniSONE (DELTASONE) 10 MG tablet 40mg po daily x 4 days, then 30mg PO daily x 3 days, 20mg po daily x 2 daily, 10mg po daily x 1 days    alprazolam (XANAX) 0.25 MG tablet Take 1 tablet (0.25 mg total) by mouth daily as needed for Anxiety.           Clinical Reference Information           Your Vitals Were     BP Pulse Resp Height Weight SpO2    120/83 93 18 5' 2" (1.575 m) 111.5 kg (245 lb 13 oz) 98%    BMI                44.96 kg/m2          Blood Pressure          Most Recent Value    BP  120/83      Allergies as of 4/28/2017     No Known Allergies      Immunizations Administered on Date of Encounter - 4/28/2017     None      Orders Placed During Today's Visit      Normal Orders This Visit    CPAP FOR HOME USE     CPAP/BIPAP SUPPLIES       [facility] Sign-Up     Activating your [facility] account is as easy as 1-2-3!     1) Visit [email or web address], select Sign Up Now, enter this activation code and your date of birth, then select Next.  [email or web address]  Expires: 5/13/2017  1:13 PM      2) Create a username and password to use when you visit [facility] in the future and select a security question in case you lose your password and select " Next.    3) Enter your e-mail address and click Sign Up!    Additional Information  If you have questions, please e-mail [email or web address] or call [phone] to talk to our [facility] staff. Remember, [facility] is NOT to be used for urgent needs. For medical emergencies, dial 911.         Language Assistance Services     ATTENTION: Language assistance services are available, free of charge. Please call [phone].      ATENCIÓN: Si habla isabella, tiene a smith disposición servicios gratuitos de asistencia lingüística. Llame al [phone].     CHÚ Ý: N?u b?n nói Ti?ng Vi?t, có các d?ch v? h? tr? ngôn ng? mi?n phí dành cho b?n. G?i s? [phone].         [facility] complies with applicable Federal civil rights laws and does not discriminate on the basis of race, color, national origin, age, disability, or sex.

## 2017-04-28 NOTE — PATIENT INSTRUCTIONS
Obstructive Sleep Apnea  Obstructive sleep apnea is a condition that causes your air passages to become narrowed or blocked during sleep. As a result, breathing stops for short periods. Your body wakes up enough for breathing to begin again, though you don't remember it. The cycle of stopped breathing and brief awakenings can repeat dozens of times a night. This prevents the body from getting to the deeper stages of sleep that are needed for good rest.  Signs of sleep apnea include loud snoring, noisy breathing, and gasping sounds during sleep. Daytime symptoms include waking up tired after a full night's sleep, waking up with headaches, feeling very sleepy or falling asleep during the day, and having problems with memory or concentration.  Risk factors for sleep apnea include:  · Being overweight  · Being a man, or a woman in menopause  · Smoking  · Using alcohol or sedating medications or herbs  · Having enlarged structures in the nose or throat  Home care  Lifestyle changes that can help treat snoring and sleep apnea include the following:  · If you are overweight, lose weight. Talk to your healthcare provider about a weight-loss plan for you.  · Avoid alcohol for 3 to 4 hours before bedtime. Avoid sedating medications. Ask your healthcare provider about the medications you take.  · If you smoke, talk to your healthcare provider about ways to quit.  · Sleep on your side. This can help prevent gravity from pulling relaxed throat tissues into your breathing passages.  · If you have allergies or sinus problems that block your nose, ask your healthcare provider for help.  Follow up  Follow up with your healthcare provider as advised. A diagnosis of sleep apnea is made with a sleep study. Your healthcare provider can tell you more about this test.  When to seek medical care  Sleep apnea can make you more likely to have certain health problems. These include high blood pressure, heart attack, stroke, and sexual  dysfunction. If you have sleep apnea, talk to your healthcare provider about the best treatments for you.  Date Last Reviewed: 5/3/2015  © 7668-7536 [employer]. [address] [place] [address]. All rights reserved. This information is not intended as a substitute for professional medical care. Always follow your healthcare professional's instructions.        Continuous Positive Air Pressure (CPAP)  Continuous positive air pressure (CPAP) uses gentle air pressure to hold the airway open. CPAP is often the most effective treatment for sleep apnea and severe snoring. It works very well for many people. But keep in mind that it can take several adjustments before the setup is right for you.    How CPAP works  The CPAPmachine  is a small portable pump beside the bed. The pump sends air through a hose, which is held over your nose and mouth by a mask. Mild air pressure is gently pushed through your airway. The air pressure nudges sagging tissues aside. This widens the airway so you can breathe better. CPAP may be combined with other kinds of therapy for sleep apnea.     A mask over the nose gently directs air into the throat to keep the airway open.   Types of air pressure treatments  There are different types of CPAP. Your doctor or CPAP technician will help you decide which type is best for you:  · Basic CPAP keeps the pressure constant all night long.  · A bilevel device (BiPAP) provides more pressure when you breathe in and less when you breathe out. A BiPAP machine also may be set to provide automatic breaths to maintain breathing if you stop breathing while sleeping.  · An autoCPAP device automatically adjusts pressure throughout the night and in response to changes such as body position, sleep stage, and snoring.  Date Last Reviewed: 8/10/2015  © 3088-8468 [employer]. [address] [place] [address]. All rights reserved. This information is not intended as a  substitute for professional medical care. Always follow your healthcare professional's instructions.

## 2017-05-08 ENCOUNTER — OFFICE VISIT (OUTPATIENT)
Dept: OPHTHALMOLOGY | Facility: CLINIC | Age: 47
End: 2017-05-08
Payer: MEDICARE

## 2017-05-08 DIAGNOSIS — H20.9 UVEITIC GLAUCOMA OF BOTH EYES, SEVERE STAGE: Primary | Chronic | ICD-10-CM

## 2017-05-08 DIAGNOSIS — Z91.148 NON COMPLIANCE W MEDICATION REGIMEN: ICD-10-CM

## 2017-05-08 DIAGNOSIS — D86.9 SARCOIDOSIS: ICD-10-CM

## 2017-05-08 DIAGNOSIS — H40.43X3 UVEITIC GLAUCOMA OF BOTH EYES, SEVERE STAGE: Primary | Chronic | ICD-10-CM

## 2017-05-08 PROCEDURE — 99211 OFF/OP EST MAY X REQ PHY/QHP: CPT | Mod: PBBFAC | Performed by: OPHTHALMOLOGY

## 2017-05-08 PROCEDURE — 92012 INTRM OPH EXAM EST PATIENT: CPT | Mod: S$PBB,,, | Performed by: OPHTHALMOLOGY

## 2017-05-08 PROCEDURE — 99999 PR PBB SHADOW E&M-EST. PATIENT-LVL I: CPT | Mod: PBBFAC,,, | Performed by: OPHTHALMOLOGY

## 2017-05-08 RX ORDER — ATROPINE SULFATE 10 MG/ML
1 SOLUTION/ DROPS OPHTHALMIC 2 TIMES DAILY
Qty: 1 BOTTLE | Refills: 3 | Status: SHIPPED | OUTPATIENT
Start: 2017-05-08 | End: 2017-07-03 | Stop reason: SDUPTHER

## 2017-05-08 RX ORDER — DORZOLAMIDE HYDROCHLORIDE AND TIMOLOL MALEATE 20; 5 MG/ML; MG/ML
1 SOLUTION/ DROPS OPHTHALMIC EVERY 12 HOURS
Qty: 1 BOTTLE | Refills: 6 | Status: SHIPPED | OUTPATIENT
Start: 2017-05-08 | End: 2017-07-03 | Stop reason: SDUPTHER

## 2017-05-08 NOTE — PROGRESS NOTES
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      REFERRED BY TRF  LAST SAW TRF 04/18/17  SARCOID UVEITIS      OU PRED QID, SIMBRINZA BID OU (PATIENT NEVER FILLED ATROPINE OR   LATANOPROST)      Pt brought in previous bottles of Timolol, Combigan, Simbrinza,Cosopt -   says they were given to her in [place]            Last edited by Ulisses Omer MD on 5/8/2017  9:58 AM.         Assessment /Plan     For exam results, see Encounter Report.      ICD-10-CM ICD-9-CM    1. Uveitic glaucoma of both eyes, severe stage H40.43X3 365.62 atropine 1% (ISOPTO ATROPINE) 1 % Drop    H20.9 365.73 dorzolamide-timolol 2-0.5% (COSOPT) 22.3-6.8 mg/mL ophthalmic solution   2. Sarcoidosis D86.9 135    3. Non compliance w medication regimen Z91.14 V15.81 Pt understands the importance of meds and will increase compliance   Pt Son has written instructions on all medication and that they were called into her Pharmacy of her request      Stoop Simbrinza and change to cosopt due to cost   Taper Pred to BID OU  Start Atropine BID OU   Start cosopt BID OU     RETURN TO CLINIC 3 months at [facility]

## 2017-05-24 ENCOUNTER — TELEPHONE (OUTPATIENT)
Dept: PULMONOLOGY | Facility: CLINIC | Age: 47
End: 2017-05-24

## 2017-05-24 NOTE — TELEPHONE ENCOUNTER
----- Message from Farrah Mullins sent at 5/24/2017 10:50 AM CDT -----  Contact: Patient   Patient needs to know if she still needs to make her appointment even tho she does not have a C pap machine, Please call her at [phone] or [phone].    Thanks  td

## 2017-06-28 ENCOUNTER — PROCEDURE VISIT (OUTPATIENT)
Dept: PULMONOLOGY | Facility: CLINIC | Age: 47
End: 2017-06-28
Payer: MEDICARE

## 2017-06-28 ENCOUNTER — OFFICE VISIT (OUTPATIENT)
Dept: PULMONOLOGY | Facility: CLINIC | Age: 47
End: 2017-06-28
Payer: MEDICARE

## 2017-06-28 ENCOUNTER — LAB VISIT (OUTPATIENT)
Dept: LAB | Facility: HOSPITAL | Age: 47
End: 2017-06-28
Attending: INTERNAL MEDICINE
Payer: MEDICARE

## 2017-06-28 VITALS
HEIGHT: 63 IN | RESPIRATION RATE: 18 BRPM | DIASTOLIC BLOOD PRESSURE: 90 MMHG | WEIGHT: 253.06 LBS | BODY MASS INDEX: 44.83 KG/M2 | OXYGEN SATURATION: 99 % | HEIGHT: 63 IN | SYSTOLIC BLOOD PRESSURE: 149 MMHG | BODY MASS INDEX: 44.84 KG/M2 | WEIGHT: 253 LBS | HEART RATE: 83 BPM

## 2017-06-28 DIAGNOSIS — J45.909 MODERATE ASTHMA: Chronic | ICD-10-CM

## 2017-06-28 DIAGNOSIS — D86.9 SARCOIDOSIS: ICD-10-CM

## 2017-06-28 DIAGNOSIS — H54.8 LEGAL BLINDNESS: ICD-10-CM

## 2017-06-28 DIAGNOSIS — H20.9 UVEITIC GLAUCOMA OF BOTH EYES, SEVERE STAGE: Chronic | ICD-10-CM

## 2017-06-28 DIAGNOSIS — G47.33 OSA (OBSTRUCTIVE SLEEP APNEA): Primary | Chronic | ICD-10-CM

## 2017-06-28 DIAGNOSIS — H40.43X3 UVEITIC GLAUCOMA OF BOTH EYES, SEVERE STAGE: Chronic | ICD-10-CM

## 2017-06-28 PROBLEM — E66.01 MORBID OBESITY: Status: RESOLVED | Noted: 2017-03-26 | Resolved: 2017-06-28

## 2017-06-28 PROBLEM — R06.09 DOE (DYSPNEA ON EXERTION): Status: RESOLVED | Noted: 2017-04-10 | Resolved: 2017-06-28

## 2017-06-28 LAB
PRE FEF 25 75: 1.96 L/S (ref 1.79–3.12)
PRE FET 100: 1.21 S
PRE FEV1 FVC: 99 %
PRE FEV1: 1.37 L (ref 1.99–2.57)
PRE FIF 50: 1.08 L/S
PRE FVC: 1.39 L (ref 2.48–3.17)
PRE PEF: 3.29 L/S (ref 5.15–7.09)
PREDICTED FEV1 FVC: 80.61 % (ref 75.71–85.51)
PREDICTED FEV1: 2.28 L (ref 1.99–2.57)
PREDICTED FVC: 2.83 L (ref 2.48–3.17)
PROVOCATION PROTOCOL: ABNORMAL

## 2017-06-28 PROCEDURE — 94620 PR PULMONARY STRESS TESTING,SIMPLE: CPT | Mod: 26,S$PBB,, | Performed by: INTERNAL MEDICINE

## 2017-06-28 PROCEDURE — 99999 PR PBB SHADOW E&M-EST. PATIENT-LVL III: CPT | Mod: PBBFAC,,, | Performed by: INTERNAL MEDICINE

## 2017-06-28 PROCEDURE — 99214 OFFICE O/P EST MOD 30 MIN: CPT | Mod: 25,S$PBB,, | Performed by: INTERNAL MEDICINE

## 2017-06-28 PROCEDURE — 94010 BREATHING CAPACITY TEST: CPT | Mod: 26,59,S$PBB, | Performed by: INTERNAL MEDICINE

## 2017-06-28 PROCEDURE — 99213 OFFICE O/P EST LOW 20 MIN: CPT | Mod: PBBFAC | Performed by: INTERNAL MEDICINE

## 2017-06-28 RX ORDER — FLUTICASONE FUROATE AND VILANTEROL 200; 25 UG/1; UG/1
1 POWDER RESPIRATORY (INHALATION) DAILY
Qty: 1 EACH | Refills: 5 | Status: SHIPPED | OUTPATIENT
Start: 2017-06-28 | End: 2017-09-11 | Stop reason: SDUPTHER

## 2017-06-28 NOTE — PROGRESS NOTES
"Spirometry attempted.  Pt could not complete flow volume loop six second criteria. 5 trials attempted. Patient felt she had to "catch her breath" before six second maneuver was completed.    "

## 2017-06-28 NOTE — PROCEDURES
"[facility]  Six Minute Walk     SUMMARY     Ordering Provider: farzaneh Ramirez nurse/tech/RT: nilsa  Diagnosis: Shortness of Breath  Height: 5' 3" (160 cm)  Weight: 114.8 kg (253 lb)  BMI (Calculated): 44.9   Patient Race:             Phase Oxygen Assessment Supplemental O2 Heart   Rate Blood Pressure Alexei Dyspnea Scale Rating   Resting 100 % Room Air 99 bpm 146/83 2   Exercise        Minute        1 99 % Room Air 99 bpm     2 99 % Room Air 98 bpm     3 100 % Room Air 98 bpm     4 100 % 4 L/M 101 bpm     5 100 % Room Air 98 bpm     6  100 % Room Air 99 bpm 147/81 5-6   Recovery        Minute        1 100 % Room Air 87 bpm     2 100 % Room Air 83 bpm     3 99 % Room Air 83 bpm     4 99 % Room Air 83 bpm 149/90 3     Six Minute Walk Summary     Patient Reported: Dyspnea (chest tightness)     Interpretation:  Did the patient stop or pause?: Yes  How many times did the patient stop or pause?: 2  Stop Time 1: 132  Restart Time 1: 142.8     Stop Time 2: 262.8  Restart Time 2: 273         Total Time Walked (Calculated): 360 seconds  Final Partial Lap Distance (feet): 0 feet  Total Distance Meters (Calculated): 121.92 meters  Predicted Distance Meters (Calculated): 464.36 meters  Percentage of Predicted (Calculated): 26.26  Peak VO2 (Calculated): 7.64  Mets: 2.18        REPORT  Protocol was completed with 2 stops periods  Exercise capacity was significantly below predicted.  26%  Dyspnea was moderate  Exercise desaturation  Impaired exercise capacity  Patient is visually impaired    Mike Mota MD         "

## 2017-06-28 NOTE — PATIENT INSTRUCTIONS
What Are CPAP and Other Air Pressure Treatments?   Continuous positive air pressure (CPAP) uses gentle air pressure to hold the airway open. CPAP is often the most effective treatment for sleep apnea and severe snoring. It works very well for many people. But keep in mind that it can take several adjustments before the setup is right for you.   How CPAP Works   A small portable pump beside the bed sends air through a hose, which is held over your nose by a mask. Air is gently pushed through your airway. The air pressure nudges sagging tissues aside. This widens the airway so you can breathe better. CPAP may be combined with other kinds of therapy for sleep apnea.      A mask over the nose gently directs air into the throat to keep the airway open.      Types of Air Pressure Treatments   There are different types of CPAP. Your doctor or CPAP technician will help you decide which type is best for you:   Basic CPAP keeps the pressure constant all night long.   A bilevel device gives out more pressure when you breathe in and less when you breathe out.   An autoCPAP device automatically adjusts pressure throughout the night and in response to changes such as body position, sleep stage, and snoring.      Please call office [phone] for any questions

## 2017-06-28 NOTE — PROGRESS NOTES
"Subjective:       Patient ID: Jennifer Mcclain is a 46 y.o. female.    Chief Complaint: Asthma (rev walk pft) and Sleep Apnea    Ms. Jennifer Mcclain is 46 years old  She has advance ocular sarcoidosis with blindness  She's been seen by ophthalmology for her uveitis  Progress sarcoidosis is stable  Her PFTs are consistent with a restrictive ventilatory defect  She has asthma which is stable  When I saw her last time I ordered Symbicort patient tells me co-pay was too high  We'll change this to Breo Ellipta  I'll sleep study was reviewed she has obstructive sleep apnea that requires treatment  I CPAP machine had been ordered which she has not received  It appears patient is commuting between [place] and [place]   I have encouraged patient to consolidate her care to single location  She is currently living with her son-in-law here in [place]   On the 6 minute walk test she did not require supplementary oxygen.         Review of Systems   Constitutional: Negative.    HENT: Negative.    Eyes:        Legally blind   Respiratory: Positive for use of rescue inhaler. Negative for cough and dyspnea on extertion.    Cardiovascular: Negative.    Genitourinary: Negative.    Endocrine: endocrine negative   Musculoskeletal: Negative.    Skin: Negative.    Gastrointestinal: Negative.    Neurological: Negative.    Psychiatric/Behavioral: Negative.        Objective:       Vitals:    06/28/17 1641   BP: (!) 149/90   Pulse: 83   Resp: 18   SpO2: 99%   Weight: 114.8 kg (253 lb 1.4 oz)   Height: 5' 3" (1.6 m)     Physical Exam   Constitutional: She is oriented to person, place, and time. She appears well-developed and well-nourished. She appears not cachectic. She is obese.   HENT:   Mouth/Throat: Oropharynx is clear and moist.   Neck: Normal range of motion. Neck supple.   Cardiovascular: Normal rate and regular rhythm.    No murmur heard.  Pulmonary/Chest: Normal expansion, symmetric chest wall expansion, effort normal and breath " "sounds normal. She has no wheezes. She has no rhonchi.   Abdominal: Soft. Bowel sounds are normal.   Musculoskeletal: Normal range of motion.   Neurological: She is alert and oriented to person, place, and time. Gait normal.   Skin: Skin is warm and dry.   Psychiatric: She has a normal mood and affect. Her behavior is normal.   Nursing note and vitals reviewed.    Personal Diagnostic Review  Pulmonary function tests: FEV1: 1.37  (60 % predicted), FVC:  1.39 (49 % predicted), FEV1/FVC:  98    CPAP titration  INTERPRETATION:      1. Adequate CPAP titration  2. CPAP 10 cm water pressure was appropriate  3. Morbid obesity based on BMI     RECOMMENDATIONS:      1. CPAP 10 cm water pressure with a ResMed wisp nasal large mask.  Chin strap may be considered.  2. Weight loss exercise      6MW Test  Height: 5' 3" (160 cm)  Weight: 114.8 kg (253 lb 1.4 oz)  BMI (Calculated): 44.9  Predicted Distance: 317.98  Interpretation  Predicted Distance Meters (Calculated): 464.27 meters  Exercise capacity was suboptimal.  No flowsheet data found.      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       Plan:       Breo Coupon given    Return in about 8 weeks (around 8/23/2017) for Spirometry and cxr, Download, CPAP supplies, Rochelle Faustin NP.    This note was prepared using voice recognition system and is likely to have sound alike " errors that may have been overlooked even after proof reading.  Please call me with any questions    Discussed diagnosis, its evaluation, treatment and usual course. All questions answered.    Thank you for the courtesy of participating in the care of this patient    Mike Mota MD

## 2017-06-30 LAB — ACE SERPL-CCNC: 20 U/L

## 2017-07-03 ENCOUNTER — TELEPHONE (OUTPATIENT)
Dept: OPHTHALMOLOGY | Facility: CLINIC | Age: 47
End: 2017-07-03

## 2017-07-03 DIAGNOSIS — H40.43X3 UVEITIC GLAUCOMA OF BOTH EYES, SEVERE STAGE: Chronic | ICD-10-CM

## 2017-07-03 DIAGNOSIS — H20.9 UVEITIC GLAUCOMA OF BOTH EYES, SEVERE STAGE: Chronic | ICD-10-CM

## 2017-07-03 RX ORDER — PREDNISOLONE ACETATE 10 MG/ML
1 SUSPENSION/ DROPS OPHTHALMIC 2 TIMES DAILY
Qty: 1 BOTTLE | Refills: 3 | Status: SHIPPED | OUTPATIENT
Start: 2017-07-03 | End: 2017-10-09 | Stop reason: SDUPTHER

## 2017-07-03 RX ORDER — DORZOLAMIDE HYDROCHLORIDE AND TIMOLOL MALEATE 20; 5 MG/ML; MG/ML
1 SOLUTION/ DROPS OPHTHALMIC EVERY 12 HOURS
Qty: 1 BOTTLE | Refills: 6 | Status: SHIPPED | OUTPATIENT
Start: 2017-07-03 | End: 2017-10-09 | Stop reason: SDUPTHER

## 2017-07-03 RX ORDER — ATROPINE SULFATE 10 MG/ML
1 SOLUTION/ DROPS OPHTHALMIC 2 TIMES DAILY
Qty: 1 BOTTLE | Refills: 3 | Status: SHIPPED | OUTPATIENT
Start: 2017-07-03 | End: 2017-07-05 | Stop reason: SDUPTHER

## 2017-07-03 NOTE — TELEPHONE ENCOUNTER
----- Message from Yeyo Benoit sent at 7/3/2017  1:31 PM CDT -----  Contact: Pt   Pt is calling in regards to eye drop medicine being called in for the her today...[phone]

## 2017-07-03 NOTE — TELEPHONE ENCOUNTER
Rxs called in to [facility] [phone] per Dr. You at pt's request :  Pred Ace 1% bid OU  Isopto atropine 1% bid OU  Dorzolamide Timolol bid OU

## 2017-07-03 NOTE — TELEPHONE ENCOUNTER
----- Message from Yeyo Benoit sent at 7/3/2017  1:31 PM CDT -----  Contact: Pt   Pt is calling in regards to eye drop medicine being called in for the her today...[phone]

## 2017-07-05 DIAGNOSIS — H40.43X3 UVEITIC GLAUCOMA OF BOTH EYES, SEVERE STAGE: Chronic | ICD-10-CM

## 2017-07-05 DIAGNOSIS — H20.9 UVEITIC GLAUCOMA OF BOTH EYES, SEVERE STAGE: Chronic | ICD-10-CM

## 2017-07-05 RX ORDER — ATROPINE SULFATE 10 MG/ML
1 SOLUTION/ DROPS OPHTHALMIC 2 TIMES DAILY
Qty: 5 ML | Refills: 6 | Status: SHIPPED | OUTPATIENT
Start: 2017-07-05 | End: 2017-10-17 | Stop reason: SDUPTHER

## 2017-08-29 ENCOUNTER — OFFICE VISIT (OUTPATIENT)
Dept: PULMONOLOGY | Facility: CLINIC | Age: 47
End: 2017-08-29
Payer: MEDICARE

## 2017-08-29 ENCOUNTER — PROCEDURE VISIT (OUTPATIENT)
Dept: PULMONOLOGY | Facility: CLINIC | Age: 47
End: 2017-08-29
Payer: MEDICARE

## 2017-08-29 ENCOUNTER — HOSPITAL ENCOUNTER (OUTPATIENT)
Dept: RADIOLOGY | Facility: HOSPITAL | Age: 47
Discharge: HOME | End: 2017-08-29
Attending: INTERNAL MEDICINE
Payer: MEDICARE

## 2017-08-29 VITALS
WEIGHT: 253 LBS | RESPIRATION RATE: 18 BRPM | DIASTOLIC BLOOD PRESSURE: 82 MMHG | BODY MASS INDEX: 44.83 KG/M2 | HEART RATE: 90 BPM | HEIGHT: 63 IN | OXYGEN SATURATION: 96 % | SYSTOLIC BLOOD PRESSURE: 120 MMHG

## 2017-08-29 DIAGNOSIS — J45.909 MODERATE ASTHMA: Primary | Chronic | ICD-10-CM

## 2017-08-29 DIAGNOSIS — D86.9 SARCOIDOSIS: ICD-10-CM

## 2017-08-29 DIAGNOSIS — H54.8 LEGAL BLINDNESS: ICD-10-CM

## 2017-08-29 DIAGNOSIS — J45.909 MODERATE ASTHMA: Chronic | ICD-10-CM

## 2017-08-29 DIAGNOSIS — H40.43X3 UVEITIC GLAUCOMA OF BOTH EYES, SEVERE STAGE: Chronic | ICD-10-CM

## 2017-08-29 DIAGNOSIS — H20.9 UVEITIC GLAUCOMA OF BOTH EYES, SEVERE STAGE: Chronic | ICD-10-CM

## 2017-08-29 DIAGNOSIS — G47.33 OSA ON CPAP: ICD-10-CM

## 2017-08-29 LAB
POST FEF 25 75: 1.11 L/S (ref 1.75–3.08)
POST FET 100: 7.54 S
POST FEV1 FVC: 75 %
POST FEV1: 1.47 L (ref 1.96–2.55)
POST FIF 50: 1.65 L/S
POST FVC: 1.97 L (ref 2.46–3.15)
POST PEF: 4.81 L/S (ref 5.1–7.05)
PRE FEF 25 75: 1.03 L/S (ref 1.75–3.08)
PRE FET 100: 7.58 S
PRE FEV1 FVC: 74 %
PRE FEV1: 1.37 L (ref 1.96–2.55)
PRE FIF 50: 2.11 L/S
PRE FVC: 1.85 L (ref 2.46–3.15)
PRE PEF: 5.21 L/S (ref 5.1–7.05)
PREDICTED FEV1 FVC: 80.4 % (ref 75.5–85.29)
PREDICTED FEV1: 2.26 L (ref 1.96–2.55)
PREDICTED FVC: 2.81 L (ref 2.46–3.15)
PROVOCATION PROTOCOL: ABNORMAL

## 2017-08-29 PROCEDURE — 71020 XR CHEST PA AND LATERAL: CPT | Mod: 26,,, | Performed by: RADIOLOGY

## 2017-08-29 PROCEDURE — 3079F DIAST BP 80-89 MM HG: CPT | Mod: ,,, | Performed by: INTERNAL MEDICINE

## 2017-08-29 PROCEDURE — 99214 OFFICE O/P EST MOD 30 MIN: CPT | Mod: 25,S$PBB,, | Performed by: INTERNAL MEDICINE

## 2017-08-29 PROCEDURE — 94060 EVALUATION OF WHEEZING: CPT | Mod: 26,S$PBB,, | Performed by: INTERNAL MEDICINE

## 2017-08-29 PROCEDURE — 99999 PR PBB SHADOW E&M-EST. PATIENT-LVL III: CPT | Mod: PBBFAC,,, | Performed by: INTERNAL MEDICINE

## 2017-08-29 PROCEDURE — 99213 OFFICE O/P EST LOW 20 MIN: CPT | Mod: PBBFAC,25 | Performed by: INTERNAL MEDICINE

## 2017-08-29 PROCEDURE — 94060 EVALUATION OF WHEEZING: CPT | Mod: PBBFAC

## 2017-08-29 PROCEDURE — 3074F SYST BP LT 130 MM HG: CPT | Mod: ,,, | Performed by: INTERNAL MEDICINE

## 2017-08-29 PROCEDURE — 71020 XR CHEST PA AND LATERAL: CPT | Mod: TC

## 2017-08-29 NOTE — ASSESSMENT & PLAN NOTE
CPAP was delivered  Using every day  [place] score 11  Bed time 10-11pm  Wake time: 7 am  Will follow for download
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
## 2017-08-30 ENCOUNTER — OUTPATIENT CASE MANAGEMENT (OUTPATIENT)
Dept: ADMINISTRATIVE | Facility: OTHER | Age: 47
End: 2017-08-30

## 2017-08-30 NOTE — PROGRESS NOTES
Attempt #:  1  This LMSW attempted to reach patient/caregiver to provide resource and left a message requesting a return call.

## 2017-08-30 NOTE — PROGRESS NOTES
Thank you for the referral.  Patient has been assigned to Charity Pinto LMSW for low risk screening for Outpatient Case Management.     Reason for referral:   Moderate asthma  GEN on CPAP  Sarcoidosis  BMI 40.0-44.9, adult  Legal blindness  Uveitic glaucoma of both eyes, severe stage    Comment: Needs  with vision impairment rehabiltation    Thank you,  Abbi Wright

## 2017-08-31 ENCOUNTER — OUTPATIENT CASE MANAGEMENT (OUTPATIENT)
Dept: ADMINISTRATIVE | Facility: OTHER | Age: 47
End: 2017-08-31

## 2017-08-31 NOTE — PROGRESS NOTES
This SW received a referral on the above patient.   Reason for referral: Low risk, Moderate asthma, GEN on CPAP, Sarcoidosis, BMI 40.0-44.9, adult, Legal blindness, Uveitic glaucoma of both eyes, severe stage  Name of the community resource that was provided: Pharmacy Patient Assistance Program, [place], [place] Department of : Rehabilitation Services, [place] [facility] and Disability Resource [place]  Resource given to: Patient via [place] mail.     This SW completed assessment with patient. Patients traveling back and forth between [place] and [place]. Patient reports that she primarily comes to [place] for her medical care and stays with her son, daughter-in-law, and grandchildren. Patient reports that she is independent with her ADLs at this time however, she reports she is interested in receiving information for schools for the blind/visually impaired. Patient reports that she is prescribed  medications she can not afford. Patient states that one of her inhalers cost more than 300 dollars and she has been prescribed different eye drops which are 30 and 40 dollars each. SW asked patient if she was interested in being screened for [place] Medicaid, and patient reported being concerned that apply for Medicaid in [place] may affect income she is receiving in [place]. Patient was agreeable to [facility] sending referral to Pharmacy Patient Assistance for them to assess if she is eligible for any medication assistance. [facility] also mailed resources for the blind and visually impaired to patient's address on file. Referral source notified.

## 2017-09-11 ENCOUNTER — OFFICE VISIT (OUTPATIENT)
Dept: PULMONOLOGY | Facility: CLINIC | Age: 47
End: 2017-09-11
Payer: MEDICARE

## 2017-09-11 ENCOUNTER — TELEPHONE (OUTPATIENT)
Dept: PHARMACY | Facility: CLINIC | Age: 47
End: 2017-09-11

## 2017-09-11 VITALS
WEIGHT: 266.88 LBS | HEIGHT: 64 IN | RESPIRATION RATE: 20 BRPM | DIASTOLIC BLOOD PRESSURE: 72 MMHG | HEART RATE: 86 BPM | OXYGEN SATURATION: 98 % | SYSTOLIC BLOOD PRESSURE: 120 MMHG | BODY MASS INDEX: 45.56 KG/M2

## 2017-09-11 DIAGNOSIS — J45.40 MODERATE PERSISTENT ASTHMA WITHOUT COMPLICATION: Primary | ICD-10-CM

## 2017-09-11 DIAGNOSIS — G47.33 OSA ON CPAP: ICD-10-CM

## 2017-09-11 PROCEDURE — 3078F DIAST BP <80 MM HG: CPT | Mod: ,,, | Performed by: NURSE PRACTITIONER

## 2017-09-11 PROCEDURE — 99999 PR PBB SHADOW E&M-EST. PATIENT-LVL III: CPT | Mod: PBBFAC,,, | Performed by: NURSE PRACTITIONER

## 2017-09-11 PROCEDURE — 99214 OFFICE O/P EST MOD 30 MIN: CPT | Mod: S$PBB,,, | Performed by: NURSE PRACTITIONER

## 2017-09-11 PROCEDURE — 99213 OFFICE O/P EST LOW 20 MIN: CPT | Mod: PBBFAC | Performed by: NURSE PRACTITIONER

## 2017-09-11 PROCEDURE — 3074F SYST BP LT 130 MM HG: CPT | Mod: ,,, | Performed by: NURSE PRACTITIONER

## 2017-09-11 RX ORDER — FLUTICASONE FUROATE AND VILANTEROL 200; 25 UG/1; UG/1
1 POWDER RESPIRATORY (INHALATION) DAILY
Qty: 60 EACH | Refills: 11 | Status: SHIPPED | OUTPATIENT
Start: 2017-09-11 | End: 2018-10-29

## 2017-09-11 RX ORDER — IPRATROPIUM BROMIDE AND ALBUTEROL SULFATE 2.5; .5 MG/3ML; MG/3ML
3 SOLUTION RESPIRATORY (INHALATION) EVERY 4 HOURS PRN
Qty: 360 ML | Refills: 4 | Status: ON HOLD | OUTPATIENT
Start: 2017-09-11 | End: 2018-02-15

## 2017-09-11 RX ORDER — ALBUTEROL SULFATE 90 UG/1
2 AEROSOL, METERED RESPIRATORY (INHALATION) EVERY 4 HOURS PRN
Qty: 18 G | Refills: 11 | Status: SHIPPED | OUTPATIENT
Start: 2017-09-11 | End: 2018-10-29
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
## 2017-09-11 NOTE — TELEPHONE ENCOUNTER
Spoke with patient about referral for medications. I will assist patient with Inhalers (ventolin and Breo) and also 2 of her eye drops (Pred Forte and Cosopt).  I am mailing out the application to the patient.

## 2017-09-11 NOTE — ASSESSMENT & PLAN NOTE
Still has not filled Breo,  Scripts sent to [facility], possible medication assistance.  Has free one month breo coupon.  Duo neb solution and ventolin inhaler scripts sent to [facility]  Patient referred by social service for medication assistance.

## 2017-10-09 ENCOUNTER — OFFICE VISIT (OUTPATIENT)
Dept: OPHTHALMOLOGY | Facility: CLINIC | Age: 47
End: 2017-10-09
Payer: MEDICARE

## 2017-10-09 ENCOUNTER — OFFICE VISIT (OUTPATIENT)
Dept: PULMONOLOGY | Facility: CLINIC | Age: 47
End: 2017-10-09
Payer: MEDICARE

## 2017-10-09 VITALS
RESPIRATION RATE: 18 BRPM | DIASTOLIC BLOOD PRESSURE: 76 MMHG | SYSTOLIC BLOOD PRESSURE: 122 MMHG | BODY MASS INDEX: 46.22 KG/M2 | HEART RATE: 86 BPM | OXYGEN SATURATION: 97 % | WEIGHT: 270.75 LBS | HEIGHT: 64 IN

## 2017-10-09 DIAGNOSIS — H54.8 LEGAL BLINDNESS: ICD-10-CM

## 2017-10-09 DIAGNOSIS — H40.43X3 UVEITIC GLAUCOMA OF BOTH EYES, SEVERE STAGE: Primary | ICD-10-CM

## 2017-10-09 DIAGNOSIS — D86.9 SARCOIDOSIS: ICD-10-CM

## 2017-10-09 DIAGNOSIS — J45.40 MODERATE PERSISTENT ASTHMA WITHOUT COMPLICATION: Primary | ICD-10-CM

## 2017-10-09 DIAGNOSIS — Z91.148 NON COMPLIANCE W MEDICATION REGIMEN: ICD-10-CM

## 2017-10-09 DIAGNOSIS — G47.33 OSA ON CPAP: ICD-10-CM

## 2017-10-09 DIAGNOSIS — H20.9 UVEITIC GLAUCOMA OF BOTH EYES, SEVERE STAGE: Primary | ICD-10-CM

## 2017-10-09 DIAGNOSIS — E66.01 MORBID OBESITY WITH BODY MASS INDEX (BMI) OF 40.0 TO 49.9: ICD-10-CM

## 2017-10-09 PROCEDURE — 99999 PR PBB SHADOW E&M-EST. PATIENT-LVL I: CPT | Mod: PBBFAC,,, | Performed by: OPHTHALMOLOGY

## 2017-10-09 PROCEDURE — 99213 OFFICE O/P EST LOW 20 MIN: CPT | Mod: S$PBB,,, | Performed by: NURSE PRACTITIONER

## 2017-10-09 PROCEDURE — 99999 PR PBB SHADOW E&M-EST. PATIENT-LVL IV: CPT | Mod: PBBFAC,,, | Performed by: NURSE PRACTITIONER

## 2017-10-09 PROCEDURE — 99214 OFFICE O/P EST MOD 30 MIN: CPT | Mod: PBBFAC,27 | Performed by: NURSE PRACTITIONER

## 2017-10-09 PROCEDURE — G0008 ADMIN INFLUENZA VIRUS VAC: HCPCS | Mod: PBBFAC

## 2017-10-09 PROCEDURE — 92012 INTRM OPH EXAM EST PATIENT: CPT | Mod: S$PBB,,, | Performed by: OPHTHALMOLOGY

## 2017-10-09 PROCEDURE — 99211 OFF/OP EST MAY X REQ PHY/QHP: CPT | Mod: PBBFAC,25 | Performed by: OPHTHALMOLOGY

## 2017-10-09 RX ORDER — DORZOLAMIDE HYDROCHLORIDE AND TIMOLOL MALEATE 20; 5 MG/ML; MG/ML
1 SOLUTION/ DROPS OPHTHALMIC EVERY 12 HOURS
Qty: 1 BOTTLE | Refills: 6 | Status: SHIPPED | OUTPATIENT
Start: 2017-10-09 | End: 2018-11-01 | Stop reason: SDUPTHER

## 2017-10-09 RX ORDER — PREDNISOLONE ACETATE 10 MG/ML
1 SUSPENSION/ DROPS OPHTHALMIC 2 TIMES DAILY
Qty: 1 BOTTLE | Refills: 3 | Status: ON HOLD | OUTPATIENT
Start: 2017-10-09 | End: 2018-02-15 | Stop reason: HOSPADM

## 2017-10-09 NOTE — ASSESSMENT & PLAN NOTE
Reports improved since on Breo with improved shortness of breath on exertion.  No wheezing no cough  Ventolin inhaler 3 times a week before walking program out side.   Has not used/needed duo neb.

## 2017-10-09 NOTE — PROGRESS NOTES
HPI     C/c pt states her eyes are itchy and she has a headache- she ran out of   Pred on Friday or Saturday- she states she is only using 2 meds- she never   got the cosopt , pt was unsure why -   Pt also says her meds are expensive     UVEITIC GLAUCOMA  SARCOID UVEITIS  H/O NON COMPLIANCE WITH TX    PRED BID OU (pt ran out on Saturday)   COSOPT BID OU  (pt says she never had this med?)  ATROPINE BID OU    Last edited by Ulisses Omer MD on 10/9/2017  8:17 AM. (History)            Assessment /Plan     For exam results, see Encounter Report.      ICD-10-CM ICD-9-CM    1. Uveitic glaucoma of both eyes, severe stage H40.43X3 365.62 dorzolamide-timolol 2-0.5% (COSOPT) 22.3-6.8 mg/mL ophthalmic solution  Endstage, do not think additional surgery could be entertained due to distorted anatomy, the presence of a previous tube shunt OS, and essentially no anterior chamber OS.     H20.9 365.73    2. Sarcoidosis D86.9 135    3. Non compliance w medication regimen Z91.14 V15.81 Pt was given refills today and encouraged compliance    4. 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

## 2017-10-09 NOTE — ASSESSMENT & PLAN NOTE
Encouraged calorie reduction and 30 minutes of exercise daily. Discussed impact of obesity on general health. Patient has gained 25 lbs since April 2017.

## 2017-10-09 NOTE — PROGRESS NOTES
Subjective:      Patient ID: Jennifer Mcclain is a 47 y.o. female.    Chief Complaint: Sleep Apnea    Patient is blind, accompanied by her son.     HPI: Jennifer Mcclain is here for follow up for GEN and CPAP complaince assessment and reevaluation of asthma after beginning breo, duo neb and albuterol inhaler.   She is on CPAP  of 10 cmH2O pressure.   She reports she is compliant with CPAP use with placing CPAP on at 10 pm and off at 7 am when awakening and out of bed.   No complaince download available at this appointment awaiting fax from [facility].    Patient reports benefit from CPAP use and denies snoring and daytime sleepiness is improved.   Manderson 9     No complaints with CPAP use.     Asthma  Patient presents for reevaluation of asthma after beginning Breo treatment. She reports she feels her asthma is well controlled since Breo.  She is using ventolin rescue inhaler about 3 times a week when she does a walking program out side.   Has not used the nebulizer.  She had social service consult.  At her last visit on 9/11/2017 her pulmonary medication: duo neb, breo, and ventolin were sent to [facility] to determine if meets criteria for pharmacy assistance.   She filled her Breo with the month free coupon and has obtained refills since her last visit.     Previous Report Reviewed: lab reports and office notes     The following portions of the patient's history were reviewed and updated as appropriate: allergies, current medications, past family history, past medical history, past social history, past surgical history and problem list.    Review of Systems   Constitutional: Negative for fever, chills, weight loss, weight gain, activity change, appetite change, fatigue and night sweats.   HENT: Negative for postnasal drip, rhinorrhea, sinus pressure, voice change and congestion.    Eyes: Negative for redness and itching.   Respiratory: Negative for snoring, cough, sputum production, chest tightness, shortness of  "breath, wheezing, orthopnea, asthma nighttime symptoms, dyspnea on extertion, use of rescue inhaler and somnolence.    Cardiovascular: Negative.  Negative for chest pain, palpitations and leg swelling.   Genitourinary: Negative for difficulty urinating and hematuria.   Endocrine: Negative for cold intolerance and heat intolerance.    Musculoskeletal: Negative for arthralgias, gait problem, joint swelling and myalgias.   Skin: Negative.    Gastrointestinal: Negative for nausea, vomiting, abdominal pain and acid reflux.   Neurological: Negative for dizziness, weakness, light-headedness and headaches.   Hematological: Negative for adenopathy. No excessive bruising.   All other systems reviewed and are negative.     Objective:     Physical Exam   Constitutional: She is oriented to person, place, and time. She appears well-developed and well-nourished. She is active and cooperative.  Non-toxic appearance. She does not appear ill. No distress.   HENT:   Head: Normocephalic.   Right Ear: External ear normal.   Left Ear: External ear normal.   Nose: Nose normal.   Mouth/Throat: Oropharynx is clear and moist. No oropharyngeal exudate.   Eyes: Conjunctivae are normal.   Neck: Normal range of motion. Neck supple.   Cardiovascular: Normal rate, regular rhythm, normal heart sounds and intact distal pulses.    Pulmonary/Chest: Effort normal and breath sounds normal. No stridor.   Abdominal: Soft.   Musculoskeletal: Normal range of motion.   Lymphadenopathy:     She has no cervical adenopathy.   Neurological: She is alert and oriented to person, place, and time.   Skin: Skin is warm and dry.   Psychiatric: She has a normal mood and affect. Her behavior is normal. Judgment and thought content normal.   Vitals reviewed.    Vitals:    10/09/17 1010   BP: 122/76   Pulse: 86   Resp: 18   SpO2: 97%   Weight: 122.8 kg (270 lb 11.6 oz)   Height: 5' 3.6" (1.615 m)     body mass index is 47.06 kg/m².    Personal Diagnostic Review    CPAP " download  CPAP 10 cm  No download available, will addend visit when available from [facility].     Patient benefits and is compliant    Assessment:     1. Moderate persistent asthma without complication    2. GEN on CPAP    3. Morbid obesity with body mass index (BMI) of 40.0 to 49.9        Orders Placed This Encounter   Procedures    Influenza - Quadrivalent (3 years & older) (PF)    Spirometry with/without bronchodilator     Standing Status:   Future     Standing Expiration Date:   10/9/2018       Plan:     Problem List Items Addressed This Visit     Moderate persistent asthma without complication - Primary (Chronic)     Reports improved since on Breo with improved shortness of breath on exertion.  No wheezing no cough  Ventolin inhaler 3 times a week before walking program out side.   Has not used/needed duo neb.          Relevant Orders    Spirometry with/without bronchodilator    Morbid obesity with body mass index (BMI) of 40.0 to 49.9     Encouraged calorie reduction and 30 minutes of exercise daily. Discussed impact of obesity on general health. Patient has gained 25 lbs since April 2017.              GEN on CPAP     Benefits and compliant per patient report.  Download is pending from [facility].            Other Visit Diagnoses    None.          ([facility]). Reviewed therapeutic goals for positive airway pressure therapy CPAP  Ideal is usage 100% of nights for 6 - 8 hours per night. Minimum usage is 70% of night for at least 4 hours per night used. Pateint expressed understanding.     Return in about 6 months (around 4/9/2018) for asthma 6mo fu w/review terrie, CPAP compliance follow up.

## 2017-10-09 NOTE — ASSESSMENT & PLAN NOTE
Benefits and compliant per patient report.  [facility]

## 2017-10-17 DIAGNOSIS — H40.43X3 UVEITIC GLAUCOMA OF BOTH EYES, SEVERE STAGE: Chronic | ICD-10-CM

## 2017-10-17 DIAGNOSIS — H20.9 UVEITIC GLAUCOMA OF BOTH EYES, SEVERE STAGE: Chronic | ICD-10-CM

## 2017-10-17 RX ORDER — ATROPINE SULFATE 10 MG/ML
1 SOLUTION/ DROPS OPHTHALMIC 2 TIMES DAILY
Qty: 5 ML | Refills: 1 | Status: SHIPPED | OUTPATIENT
Start: 2017-10-17 | End: 2018-01-08 | Stop reason: SDDI

## 2017-10-17 NOTE — TELEPHONE ENCOUNTER
----- Message from El Johnson sent at 10/17/2017 12:09 PM CDT -----  Contact: pt  Pt is calling  Nurse staff regarding a refill for eye drops with the red top. Pt  Call back [phone] thanks

## 2017-10-30 ENCOUNTER — OFFICE VISIT (OUTPATIENT)
Dept: CARDIOLOGY | Facility: CLINIC | Age: 47
End: 2017-10-30
Payer: MEDICARE

## 2017-10-30 VITALS
SYSTOLIC BLOOD PRESSURE: 144 MMHG | WEIGHT: 267.19 LBS | BODY MASS INDEX: 46.44 KG/M2 | DIASTOLIC BLOOD PRESSURE: 82 MMHG

## 2017-10-30 DIAGNOSIS — D86.9 SARCOIDOSIS: ICD-10-CM

## 2017-10-30 DIAGNOSIS — I51.89 DIASTOLIC DYSFUNCTION: Primary | Chronic | ICD-10-CM

## 2017-10-30 PROCEDURE — 99214 OFFICE O/P EST MOD 30 MIN: CPT | Mod: S$PBB,,, | Performed by: INTERNAL MEDICINE

## 2017-10-30 PROCEDURE — 99999 PR PBB SHADOW E&M-EST. PATIENT-LVL III: CPT | Mod: PBBFAC,,, | Performed by: INTERNAL MEDICINE

## 2017-10-30 PROCEDURE — 99213 OFFICE O/P EST LOW 20 MIN: CPT | Mod: PBBFAC | Performed by: INTERNAL MEDICINE

## 2017-10-30 NOTE — PROGRESS NOTES
Subjective:   Patient ID:  Jennifer Mcclain is a 47 y.o. female who presents for follow up of Shortness of Breath (follow-up)      45 yo female, for f/u  PMH Uveittis sarcodosis, legal blind, asthma and pulm sarcodosis, f/u at [facility] before, moved to  [place].  [place] for a yr, stable, and can walk about 25 yards before stops to catch the breathing,  Recently wad admitted to [facility] for asthma.  Occasional sharp chest pain once for two weeks, not triggered by exertion, for few seconds. Sometime dizziness. No syncope and palpitation.  Off steroid now.  Some fluttering sensation.  EKG NSR  03/2017, ECHO normal EF, LVH and DD.  04/2017 HOlter rare PVC        Past Medical History:   Diagnosis Date    Asthma     Blindness     Sarcoidosis        Past Surgical History:   Procedure Laterality Date    Laser SX Right        Social History   Substance Use Topics    Smoking status: Never Smoker    Smokeless tobacco: Never Used    Alcohol use No       Family History   Problem Relation Age of Onset    Heart disease Father     Hypertension Maternal Grandmother          Review of Systems   Constitution: Negative for decreased appetite, diaphoresis, fever, weakness, malaise/fatigue and night sweats.   HENT: Negative for nosebleeds.    Eyes: Positive for vision loss in left eye and vision loss in right eye. Negative for blurred vision and double vision.   Cardiovascular: Positive for dyspnea on exertion. Negative for chest pain, claudication, irregular heartbeat, leg swelling, near-syncope, orthopnea, palpitations, paroxysmal nocturnal dyspnea and syncope.   Respiratory: Positive for shortness of breath. Negative for cough, sleep disturbances due to breathing, snoring, sputum production and wheezing.    Endocrine: Negative for cold intolerance and polyuria.   Hematologic/Lymphatic: Does not bruise/bleed easily.   Skin: Negative for rash.   Musculoskeletal: Negative for back pain, falls, joint pain, joint swelling and neck pain.    Gastrointestinal: Negative for abdominal pain, heartburn, nausea and vomiting.   Genitourinary: Negative for dysuria, frequency and hematuria.   Neurological: Negative for difficulty with concentration, dizziness, focal weakness, headaches, light-headedness, numbness and seizures.   Psychiatric/Behavioral: Negative for depression, memory loss and substance abuse. The patient does not have insomnia.    Allergic/Immunologic: Negative for HIV exposure and hives.       Objective:   Physical Exam   Constitutional: She is oriented to person, place, and time. She appears well-nourished.   HENT:   Head: Normocephalic.   Eyes: Pupils are equal, round, and reactive to light.   Neck: Normal carotid pulses and no JVD present. Carotid bruit is not present. No thyromegaly present.   Cardiovascular: Normal rate, regular rhythm, normal heart sounds and normal pulses.   No extrasystoles are present. PMI is not displaced.  Exam reveals no gallop and no S3.    No murmur heard.  Pulmonary/Chest: Breath sounds normal. No stridor. No respiratory distress.   Abdominal: Soft. Bowel sounds are normal. There is no tenderness. There is no rebound.   Musculoskeletal: Normal range of motion.   Neurological: She is alert and oriented to person, place, and time.   Skin: Skin is intact. No rash noted.   Psychiatric: Her behavior is normal.       Lab Results   Component Value Date    CHOL 209 (H) 04/18/2017     Lab Results   Component Value Date    HDL 59 04/18/2017     Lab Results   Component Value Date    LDLCALC 135.8 04/18/2017     Lab Results   Component Value Date    TRIG 71 04/18/2017     Lab Results   Component Value Date    CHOLHDL 28.2 04/18/2017       Chemistry        Component Value Date/Time     03/29/2017 0530    K 3.6 03/29/2017 0530     03/29/2017 0530    CO2 24 03/29/2017 0530    BUN 12 03/29/2017 0530    CREATININE 0.8 03/29/2017 0530    GLU 85 03/29/2017 0530        Component Value Date/Time    CALCIUM 8.8  03/29/2017 0530    ALKPHOS 64 03/26/2017 0102    AST 26 03/26/2017 0102    ALT 22 03/26/2017 0102    BILITOT 0.5 03/26/2017 0102    ESTGFRAFRICA >60 03/29/2017 0530    EGFRNONAA >60 03/29/2017 0530          No results found for: TSH  No results found for: INR, PROTIME  Lab Results   Component Value Date    WBC 7.41 03/29/2017    HGB 11.9 (L) 03/29/2017    HCT 36.4 (L) 03/29/2017    MCV 86 03/29/2017     03/29/2017     BMP  Sodium   Date Value Ref Range Status   03/29/2017 140 136 - 145 mmol/L Final     Potassium   Date Value Ref Range Status   03/29/2017 3.6 3.5 - 5.1 mmol/L Final     Chloride   Date Value Ref Range Status   03/29/2017 106 95 - 110 mmol/L Final     CO2   Date Value Ref Range Status   03/29/2017 24 23 - 29 mmol/L Final     BUN, Bld   Date Value Ref Range Status   03/29/2017 12 6 - 20 mg/dL Final     Creatinine   Date Value Ref Range Status   03/29/2017 0.8 0.5 - 1.4 mg/dL Final     Calcium   Date Value Ref Range Status   03/29/2017 8.8 8.7 - 10.5 mg/dL Final     Anion Gap   Date Value Ref Range Status   03/29/2017 10 8 - 16 mmol/L Final     eGFR if    Date Value Ref Range Status   03/29/2017 >60 >60 mL/min/1.73 m^2 Final     eGFR if non    Date Value Ref Range Status   03/29/2017 >60 >60 mL/min/1.73 m^2 Final     Comment:     Calculation used to obtain the estimated glomerular filtration  rate (eGFR) is the CKD-EPI equation. Since race is unknown   in our information system, the eGFR values for   -American and Non--American patients are given   for each creatinine result.       CrCl cannot be calculated (Patient's most recent lab result is older than the maximum 7 days allowed.).     Assessment:      1. Diastolic dysfunction    2. Sarcoidosis        Plan:   Limited ECHO for Yasmine and 4D EF  RTC in 1yr

## 2017-11-07 ENCOUNTER — CLINICAL SUPPORT (OUTPATIENT)
Dept: CARDIOLOGY | Facility: CLINIC | Age: 47
End: 2017-11-07
Payer: MEDICARE

## 2017-11-07 DIAGNOSIS — D86.9 SARCOIDOSIS: ICD-10-CM

## 2017-11-07 DIAGNOSIS — I51.89 DIASTOLIC DYSFUNCTION: Chronic | ICD-10-CM

## 2017-11-07 LAB
DIASTOLIC DYSFUNCTION: NO
RETIRED EF AND QEF - SEE NOTES: 60 (ref 55–65)

## 2017-11-07 PROCEDURE — 93307 TTE W/O DOPPLER COMPLETE: CPT | Mod: PBBFAC,PO | Performed by: INTERNAL MEDICINE

## 2018-01-08 ENCOUNTER — OFFICE VISIT (OUTPATIENT)
Dept: OPHTHALMOLOGY | Facility: CLINIC | Age: 48
End: 2018-01-08
Payer: MEDICARE

## 2018-01-08 DIAGNOSIS — D86.9 SARCOIDOSIS: ICD-10-CM

## 2018-01-08 DIAGNOSIS — H40.43X3 UVEITIC GLAUCOMA OF BOTH EYES, SEVERE STAGE: Chronic | ICD-10-CM

## 2018-01-08 DIAGNOSIS — H54.8 LEGAL BLINDNESS, AS DEFINED IN USA: Primary | ICD-10-CM

## 2018-01-08 DIAGNOSIS — H20.9 UVEITIC GLAUCOMA OF BOTH EYES, SEVERE STAGE: Chronic | ICD-10-CM

## 2018-01-08 PROCEDURE — 99212 OFFICE O/P EST SF 10 MIN: CPT | Mod: PBBFAC,PO | Performed by: OPHTHALMOLOGY

## 2018-01-08 PROCEDURE — 99212 OFFICE O/P EST SF 10 MIN: CPT | Mod: S$PBB,,, | Performed by: OPHTHALMOLOGY

## 2018-01-08 PROCEDURE — 99999 PR PBB SHADOW E&M-EST. PATIENT-LVL II: CPT | Mod: PBBFAC,,, | Performed by: OPHTHALMOLOGY

## 2018-01-08 PROCEDURE — 76512 OPH US DX B-SCAN: CPT | Mod: 50,PBBFAC,PO | Performed by: OPHTHALMOLOGY

## 2018-01-08 PROCEDURE — 76512 OPH US DX B-SCAN: CPT | Mod: 26,50,S$PBB, | Performed by: OPHTHALMOLOGY

## 2018-01-08 NOTE — PROGRESS NOTES
l    ===============================  01/08/2018   Jennifer Mcclain,   47 y.o. female   Last visit [facility]: :Visit date not found   Last visit eye dept. Visit date not found  [facility]:  Uncorrected distance visual acuity was NLP in the right eye and LP in the left eye.  Tonometry     Tonometry (Applanation, 10:30 AM)       Right Left    Pressure 45 55               Not recorded         Not recorded        Chief Complaint   Patient presents with    Sarcoidosis     legally blind ou     Ophthalmic Medications     Ophthalmic - Anti-inflammatory, Glucocorticoids Start End    prednisoLONE acetate (PRED FORTE) 1 % DrpS 10/9/2017     Sig: Place 1 drop into both eyes 2 (two) times daily.    Route: Both Eyes         HPI     Sarcoidosis    Additional comments: legally blind ou           Comments   UVEITIC GLAUCOMA  SARCOID UVEITIS  H/O NON COMPLIANCE WITH TX  S/p cyclo cryo os 2014? Pt unsure of dates   S/o Tube shunt os 2014     PRED BID OU   COSOPT BID OU   ATROPINE BID OU       Last edited by OSMAR Cervantes on 1/8/2018 10:05 AM. (History)          ________________  1/8/2018  Problem List Items Addressed This Visit     Sarcoidosis    Relevant Orders    B-Scan Ultrasound - OU - Both Eyes      Other Visit Diagnoses     Legal blindness, as defined in [place]    -  Primary    Relevant Orders    B-Scan Ultrasound - OU - Both Eyes      BSCAN/ NO RETINAL DETACHMENT OR HEMORRHAGE OU    .       ===========================

## 2018-02-03 ENCOUNTER — HOSPITAL ENCOUNTER (EMERGENCY)
Facility: HOSPITAL | Age: 48
Discharge: HOME | End: 2018-02-03
Attending: EMERGENCY MEDICINE
Payer: MEDICARE

## 2018-02-03 VITALS
OXYGEN SATURATION: 100 % | RESPIRATION RATE: 21 BRPM | SYSTOLIC BLOOD PRESSURE: 135 MMHG | HEIGHT: 63 IN | DIASTOLIC BLOOD PRESSURE: 80 MMHG | WEIGHT: 268 LBS | TEMPERATURE: 99 F | BODY MASS INDEX: 47.48 KG/M2 | HEART RATE: 99 BPM

## 2018-02-03 DIAGNOSIS — J45.901 ASTHMA ATTACK: ICD-10-CM

## 2018-02-03 DIAGNOSIS — R03.0 ELEVATED BLOOD PRESSURE READING: Primary | ICD-10-CM

## 2018-02-03 PROCEDURE — 94640 AIRWAY INHALATION TREATMENT: CPT

## 2018-02-03 PROCEDURE — 63600175 PHARM REV CODE 636 W HCPCS: Performed by: EMERGENCY MEDICINE

## 2018-02-03 PROCEDURE — 99284 EMERGENCY DEPT VISIT MOD MDM: CPT

## 2018-02-03 PROCEDURE — 25000242 PHARM REV CODE 250 ALT 637 W/ HCPCS: Performed by: EMERGENCY MEDICINE

## 2018-02-03 PROCEDURE — 99900031 HC PATIENT EDUCATION (STAT)

## 2018-02-03 RX ORDER — PREDNISONE 20 MG/1
40 TABLET ORAL DAILY
Qty: 10 TABLET | Refills: 0 | Status: SHIPPED | OUTPATIENT
Start: 2018-02-03 | End: 2018-02-08

## 2018-02-03 RX ORDER — PREDNISONE 20 MG/1
40 TABLET ORAL
Status: COMPLETED | OUTPATIENT
Start: 2018-02-03 | End: 2018-02-03

## 2018-02-03 RX ORDER — IPRATROPIUM BROMIDE AND ALBUTEROL SULFATE 2.5; .5 MG/3ML; MG/3ML
3 SOLUTION RESPIRATORY (INHALATION)
Status: COMPLETED | OUTPATIENT
Start: 2018-02-03 | End: 2018-02-03

## 2018-02-03 RX ADMIN — IPRATROPIUM BROMIDE AND ALBUTEROL SULFATE 3 ML: .5; 3 SOLUTION RESPIRATORY (INHALATION) at 02:02

## 2018-02-03 RX ADMIN — PREDNISONE 40 MG: 20 TABLET ORAL at 02:02

## 2018-02-03 NOTE — ED PROVIDER NOTES
SCRIBE #1 NOTE: I, Marilee Garcia, am scribing for, and in the presence of, Bret Meadows MD. I have scribed the entire note.      History      Chief Complaint   Patient presents with    Shortness of Breath     shortness of breath x one week. Hx of asthma       Review of patient's allergies indicates:  No Known Allergies     HPI   HPI    2/3/2018, 2:26 AM   History obtained from the patient      History of Present Illness: Jennifer Mcclain is a 47 y.o. female patient with PMHx of asthma who presents to the Emergency Department for SOB which onset gradually 1 week ago. Symptoms are constant and moderate in severity. Patient states she has been out of her Breo for a few days. Patient reports taking 4 breathing treatments today. Pt uses CPAP when sleeping. No mitigating factors reported. Sxs exacerbated with exertion. Associated sxs include intermittent CP. Patient denies any fever, chills, extremity weakness/numbness, leg swelling, dizziness, cough, N/V, recent travel, long car rides, and all other sxs at this time. No further complaints or concerns at this time.     Arrival mode: Personal vehicle      PCP: Primary Doctor No       Past Medical History:  Past Medical History:   Diagnosis Date    Asthma     Blindness     Glaucoma     Sarcoidosis        Past Surgical History:  Past Surgical History:   Procedure Laterality Date    Laser SX Right          Family History:  Family History   Problem Relation Age of Onset    Heart disease Father     Hypertension Maternal Grandmother        Social History:  Social History     Social History Main Topics    Smoking status: Never Smoker    Smokeless tobacco: Never Used    Alcohol use No    Drug use: No    Sexual activity: Unknown       ROS   Review of Systems   Constitutional: Negative for chills and fever.   HENT: Negative for sore throat.    Respiratory: Positive for shortness of breath. Negative for cough.    Cardiovascular: Positive for chest pain. Negative for leg  "swelling.   Gastrointestinal: Negative for nausea and vomiting.   Genitourinary: Negative for dysuria.   Musculoskeletal: Negative for back pain.   Skin: Negative for rash.   Neurological: Negative for dizziness, weakness and numbness.   Hematological: Does not bruise/bleed easily.   All other systems reviewed and are negative.      Physical Exam      Initial Vitals [02/03/18 0209]   BP Pulse Resp Temp SpO2   (!) 174/70 99 (!) 24 99 °F (37.2 °C) 100 %      MAP       104.67          Physical Exam  Nursing Notes and Vital Signs Reviewed.  Constitutional: Patient is in no acute distress. Well-developed and well-nourished.  Head: Atraumatic. Normocephalic.  Eyes: PERRL. EOM intact. Conjunctivae are not pale. No scleral icterus.  ENT: Mucous membranes are moist. Oropharynx is clear and symmetric.    Neck: Supple. Full ROM. No lymphadenopathy.  Cardiovascular: Regular rate. Regular rhythm. No murmurs, rubs, or gallops. Distal pulses are 2+ and symmetric.  Pulmonary/Chest: No respiratory distress. Expiratory wheezes. No rales.  Abdominal: Obese. Soft and non-distended.  There is no tenderness.  No rebound, guarding, or rigidity. Good bowel sounds.  Genitourinary: No CVA tenderness  Musculoskeletal: Moves all extremities. No obvious deformities. No edema. No calf tenderness.  Skin: Warm and dry.  Neurological:  Alert, awake, and appropriate.  Normal speech.  No acute focal neurological deficits are appreciated.  Psychiatric: Normal affect. Good eye contact. Appropriate in content.    ED Course    Procedures  ED Vital Signs:  Vitals:    02/03/18 0209 02/03/18 0255 02/03/18 0429   BP: (!) 174/70  135/80   Pulse: 99 90 99   Resp: (!) 24 20 (!) 21   Temp: 99 °F (37.2 °C)  98.7 °F (37.1 °C)   TempSrc: Oral  Oral   SpO2: 100% 99% 100%   Weight: 121.6 kg (268 lb)     Height: 5' 3" (1.6 m)         Imaging Results:  Imaging Results          X-Ray Chest 1 View (In process)                Ordered, reviewed, and independently " interpreted by the ED provider.  Study: X-ray Chest  Findings: Stable. Cardiomegaly.           The Emergency Provider reviewed the vital signs and test results, which are outlined above.    ED Discussion     3:29 AM: Reassessed pt at this time. Discussed with pt all pertinent ED information and results. Discussed pt dx and plan of tx. Gave pt all f/u and return to the ED instructions. All questions and concerns were addressed at this time. Pt expresses understanding of information and instructions, and is comfortable with plan to discharge. Pt is stable for discharge.    Pre-hypertension/Hypertension: The pt has been informed that they may have pre-hypertension or hypertension based on a blood pressure reading in the ED. I recommend that the pt call the PCP listed on their discharge instructions or a physician of their choice this week to arrange f/u for further evaluation of possible pre-hypertension or hypertension.     I discussed with patient and/or family/caretaker that evaluation in the ED does not suggest any emergent or life threatening medical conditions requiring immediate intervention beyond what was provided in the ED, and I believe patient is safe for discharge.  Regardless, an unremarkable evaluation in the ED does not preclude the development or presence of a serious of life threatening condition. As such, patient was instructed to return immediately for any worsening or change in current symptoms.      ED Medication(s):  Medications   predniSONE tablet 40 mg (40 mg Oral Given 2/3/18 0245)   albuterol-ipratropium 2.5mg-0.5mg/3mL nebulizer solution 3 mL (3 mLs Nebulization Given 2/3/18 0255)       Discharge Medication List as of 2/3/2018  3:30 AM      START taking these medications    Details   predniSONE (DELTASONE) 20 MG tablet Take 2 tablets (40 mg total) by mouth once daily., Starting Sat 2/3/2018, Until Thu 2/8/2018, Print             Follow-up Information     [facility] In 2  days.    Contact information:  [address] [facility] [address]  [phone]             [facility]    Specialty:  Emergency Medicine  Why:  As needed, If symptoms worsen  Contact information:  [address] [facility] [address] [phone]                   Medical Decision Making    Medical Decision Making:   Clinical Tests:   Radiological Study: Ordered and Reviewed           Scribe Attestation:   Scribe #1: I performed the above scribed service and the documentation accurately describes the services I performed. I attest to the accuracy of the note.    Attending:   Physician Attestation Statement for Scribe #1: I, Bret Meadows MD, personally performed the services described in this documentation, as scribed by Marilee Garcia, in my presence, and it is both accurate and complete.          Clinical Impression       ICD-10-CM ICD-9-CM   1. Elevated blood pressure reading R03.0 796.2   2. Asthma attack J45.901 493.92       Disposition:   Disposition: Discharged  Condition: Stable         Bret Meadows MD  02/03/18 0536

## 2018-02-03 NOTE — ED NOTES
Armband checked, patient verified. Verified by spelling and stated name on armband along with .   LOC: The patient is awake, alert and aware of environment with an appropriate affect, the patient is oriented x 3 and speaking appropriately. Pt is blind in both eyes.  APPEARANCE: Patient resting comfortably and in no acute distress, patient is clean and well groomed  SKIN: The skin is warm and dry, color consistent with ethnicity, patient has normal skin turgor and moist mucus membranes, no breakdown or bruising noted.   MUSCULOSKELETAL: Patient moving all extremities to command  RESPIRATORY: Airway is open and patent, respirations are regular, even and non labored. Pt c/o SOB for several days - states ran out of 2 of her asthma medications.   CARDIAC: Patient has a normal rate, no periphreal edema noted, capillary refill < 3 seconds.  ABDOMEN: Soft and non tender to palpation.

## 2018-02-12 ENCOUNTER — HOSPITAL ENCOUNTER (INPATIENT)
Facility: HOSPITAL | Age: 48
LOS: 2 days | Discharge: HOME | DRG: 189 | End: 2018-02-15
Attending: EMERGENCY MEDICINE | Admitting: HOSPITALIST
Payer: MEDICARE

## 2018-02-12 DIAGNOSIS — J20.9 ACUTE BRONCHITIS, UNSPECIFIED ORGANISM: Primary | ICD-10-CM

## 2018-02-12 DIAGNOSIS — R65.10 SIRS (SYSTEMIC INFLAMMATORY RESPONSE SYNDROME): ICD-10-CM

## 2018-02-12 DIAGNOSIS — J45.40 MODERATE PERSISTENT ASTHMA WITHOUT COMPLICATION: ICD-10-CM

## 2018-02-12 DIAGNOSIS — G47.33 OSA ON CPAP: ICD-10-CM

## 2018-02-12 DIAGNOSIS — R06.03 ACUTE RESPIRATORY DISTRESS: ICD-10-CM

## 2018-02-12 DIAGNOSIS — J45.51 SEVERE PERSISTENT ASTHMA WITH ACUTE EXACERBATION: ICD-10-CM

## 2018-02-12 DIAGNOSIS — R07.9 CHEST PAIN: ICD-10-CM

## 2018-02-12 DIAGNOSIS — E66.01 MORBID OBESITY WITH BODY MASS INDEX (BMI) OF 40.0 TO 49.9: ICD-10-CM

## 2018-02-12 DIAGNOSIS — H10.9 CONJUNCTIVITIS, UNSPECIFIED CONJUNCTIVITIS TYPE, UNSPECIFIED LATERALITY: ICD-10-CM

## 2018-02-12 LAB
ALBUMIN SERPL BCP-MCNC: 4.1 G/DL
ALP SERPL-CCNC: 67 U/L
ALT SERPL W/O P-5'-P-CCNC: 30 U/L
ANION GAP SERPL CALC-SCNC: 12 MMOL/L
AST SERPL-CCNC: 43 U/L
B-HCG UR QL: NEGATIVE
BACTERIA #/AREA URNS HPF: ABNORMAL /HPF
BASOPHILS # BLD AUTO: 0.01 K/UL
BASOPHILS NFR BLD: 0.1 %
BILIRUB SERPL-MCNC: 0.5 MG/DL
BILIRUB UR QL STRIP: NEGATIVE
BUN SERPL-MCNC: 14 MG/DL
CALCIUM SERPL-MCNC: 9.7 MG/DL
CHLORIDE SERPL-SCNC: 103 MMOL/L
CLARITY UR: CLEAR
CO2 SERPL-SCNC: 25 MMOL/L
COLOR UR: YELLOW
CREAT SERPL-MCNC: 1 MG/DL
DIFFERENTIAL METHOD: ABNORMAL
EOSINOPHIL # BLD AUTO: 0 K/UL
EOSINOPHIL NFR BLD: 0 %
ERYTHROCYTE [DISTWIDTH] IN BLOOD BY AUTOMATED COUNT: 14.9 %
EST. GFR  (AFRICAN AMERICAN): >60 ML/MIN/1.73 M^2
EST. GFR  (NON AFRICAN AMERICAN): >60 ML/MIN/1.73 M^2
FLUAV AG SPEC QL IA: NEGATIVE
FLUBV AG SPEC QL IA: NEGATIVE
GLUCOSE SERPL-MCNC: 103 MG/DL
GLUCOSE UR QL STRIP: NEGATIVE
HCT VFR BLD AUTO: 36.3 %
HGB BLD-MCNC: 12.1 G/DL
HGB UR QL STRIP: ABNORMAL
KETONES UR QL STRIP: NEGATIVE
LACTATE SERPL-SCNC: 1.4 MMOL/L
LEUKOCYTE ESTERASE UR QL STRIP: ABNORMAL
LYMPHOCYTES # BLD AUTO: 1.6 K/UL
LYMPHOCYTES NFR BLD: 14.7 %
MCH RBC QN AUTO: 28.6 PG
MCHC RBC AUTO-ENTMCNC: 33.3 G/DL
MCV RBC AUTO: 86 FL
MICROSCOPIC COMMENT: ABNORMAL
MONOCYTES # BLD AUTO: 1 K/UL
MONOCYTES NFR BLD: 9.4 %
NEUTROPHILS # BLD AUTO: 8 K/UL
NEUTROPHILS NFR BLD: 75.8 %
NITRITE UR QL STRIP: NEGATIVE
PH UR STRIP: 6 [PH] (ref 5–8)
PLATELET # BLD AUTO: 216 K/UL
PMV BLD AUTO: 11.2 FL
POTASSIUM SERPL-SCNC: 3.8 MMOL/L
PROT SERPL-MCNC: 7.7 G/DL
PROT UR QL STRIP: NEGATIVE
RBC # BLD AUTO: 4.23 M/UL
RBC #/AREA URNS HPF: 0 /HPF (ref 0–4)
SODIUM SERPL-SCNC: 140 MMOL/L
SP GR UR STRIP: 1.02 (ref 1–1.03)
SPECIMEN SOURCE: NORMAL
SQUAMOUS #/AREA URNS HPF: 5 /HPF
URN SPEC COLLECT METH UR: ABNORMAL
UROBILINOGEN UR STRIP-ACNC: NEGATIVE EU/DL
WBC # BLD AUTO: 10.53 K/UL
WBC #/AREA URNS HPF: 8 /HPF (ref 0–5)

## 2018-02-12 PROCEDURE — 85025 COMPLETE CBC W/AUTO DIFF WBC: CPT

## 2018-02-12 PROCEDURE — 81025 URINE PREGNANCY TEST: CPT

## 2018-02-12 PROCEDURE — 83735 ASSAY OF MAGNESIUM: CPT

## 2018-02-12 PROCEDURE — 81000 URINALYSIS NONAUTO W/SCOPE: CPT

## 2018-02-12 PROCEDURE — 25000003 PHARM REV CODE 250: Performed by: EMERGENCY MEDICINE

## 2018-02-12 PROCEDURE — 93010 ELECTROCARDIOGRAM REPORT: CPT | Mod: ,,, | Performed by: INTERNAL MEDICINE

## 2018-02-12 PROCEDURE — 83605 ASSAY OF LACTIC ACID: CPT

## 2018-02-12 PROCEDURE — 25000242 PHARM REV CODE 250 ALT 637 W/ HCPCS: Performed by: EMERGENCY MEDICINE

## 2018-02-12 PROCEDURE — 63600175 PHARM REV CODE 636 W HCPCS: Performed by: EMERGENCY MEDICINE

## 2018-02-12 PROCEDURE — 94640 AIRWAY INHALATION TREATMENT: CPT

## 2018-02-12 PROCEDURE — 80053 COMPREHEN METABOLIC PANEL: CPT

## 2018-02-12 PROCEDURE — 87400 INFLUENZA A/B EACH AG IA: CPT | Mod: 59

## 2018-02-12 PROCEDURE — 87086 URINE CULTURE/COLONY COUNT: CPT

## 2018-02-12 PROCEDURE — 87040 BLOOD CULTURE FOR BACTERIA: CPT

## 2018-02-12 RX ORDER — IPRATROPIUM BROMIDE AND ALBUTEROL SULFATE 2.5; .5 MG/3ML; MG/3ML
3 SOLUTION RESPIRATORY (INHALATION)
Status: COMPLETED | OUTPATIENT
Start: 2018-02-12 | End: 2018-02-12

## 2018-02-12 RX ORDER — ACETAMINOPHEN 500 MG
1000 TABLET ORAL
Status: COMPLETED | OUTPATIENT
Start: 2018-02-12 | End: 2018-02-12

## 2018-02-12 RX ORDER — KETOROLAC TROMETHAMINE 30 MG/ML
30 INJECTION, SOLUTION INTRAMUSCULAR; INTRAVENOUS
Status: COMPLETED | OUTPATIENT
Start: 2018-02-12 | End: 2018-02-12

## 2018-02-12 RX ORDER — METHYLPREDNISOLONE SOD SUCC 125 MG
125 VIAL (EA) INJECTION
Status: COMPLETED | OUTPATIENT
Start: 2018-02-12 | End: 2018-02-12

## 2018-02-12 RX ORDER — HYDROCODONE BITARTRATE AND ACETAMINOPHEN 5; 325 MG/1; MG/1
1 TABLET ORAL
Status: COMPLETED | OUTPATIENT
Start: 2018-02-12 | End: 2018-02-12

## 2018-02-12 RX ORDER — CEFTRIAXONE 1 G/1
1 INJECTION, POWDER, FOR SOLUTION INTRAMUSCULAR; INTRAVENOUS
Status: COMPLETED | OUTPATIENT
Start: 2018-02-12 | End: 2018-02-12

## 2018-02-12 RX ORDER — GUAIFENESIN/DEXTROMETHORPHAN 100-10MG/5
10 SYRUP ORAL EVERY 4 HOURS PRN
Status: DISCONTINUED | OUTPATIENT
Start: 2018-02-13 | End: 2018-02-13

## 2018-02-12 RX ORDER — ALBUTEROL SULFATE 2.5 MG/.5ML
15 SOLUTION RESPIRATORY (INHALATION)
Status: COMPLETED | OUTPATIENT
Start: 2018-02-12 | End: 2018-02-13

## 2018-02-12 RX ADMIN — IPRATROPIUM BROMIDE AND ALBUTEROL SULFATE 3 ML: .5; 3 SOLUTION RESPIRATORY (INHALATION) at 08:02

## 2018-02-12 RX ADMIN — ACETAMINOPHEN 1000 MG: 500 TABLET ORAL at 08:02

## 2018-02-12 RX ADMIN — HYDROCODONE BITARTRATE AND ACETAMINOPHEN 1 TABLET: 5; 325 TABLET ORAL at 11:02

## 2018-02-12 RX ADMIN — METHYLPREDNISOLONE SODIUM SUCCINATE 125 MG: 125 INJECTION, POWDER, FOR SOLUTION INTRAMUSCULAR; INTRAVENOUS at 08:02

## 2018-02-12 RX ADMIN — CEFTRIAXONE SODIUM 1 G: 1 INJECTION, POWDER, FOR SOLUTION INTRAMUSCULAR; INTRAVENOUS at 09:02

## 2018-02-12 RX ADMIN — KETOROLAC TROMETHAMINE 30 MG: 30 INJECTION, SOLUTION INTRAMUSCULAR at 11:02

## 2018-02-13 DIAGNOSIS — J45.909 ASTHMA: Primary | ICD-10-CM

## 2018-02-13 PROBLEM — J45.901 SEVERE ASTHMA WITH ACUTE EXACERBATION: Status: ACTIVE | Noted: 2018-02-13

## 2018-02-13 PROBLEM — R03.0 ELEVATED BP WITHOUT DIAGNOSIS OF HYPERTENSION: Status: ACTIVE | Noted: 2018-02-13

## 2018-02-13 PROBLEM — R65.10 SIRS (SYSTEMIC INFLAMMATORY RESPONSE SYNDROME): Status: ACTIVE | Noted: 2018-02-13

## 2018-02-13 PROBLEM — H57.89 EYE DRAINAGE: Status: ACTIVE | Noted: 2018-02-13

## 2018-02-13 PROBLEM — J20.9 ACUTE BRONCHITIS: Status: ACTIVE | Noted: 2018-02-13

## 2018-02-13 LAB
ALLENS TEST: ABNORMAL
BASOPHILS # BLD AUTO: 0.01 K/UL
BASOPHILS NFR BLD: 0.1 %
DELSYS: ABNORMAL
DIFFERENTIAL METHOD: ABNORMAL
EOSINOPHIL # BLD AUTO: 0 K/UL
EOSINOPHIL NFR BLD: 0 %
ERYTHROCYTE [DISTWIDTH] IN BLOOD BY AUTOMATED COUNT: 15.2 %
FIO2: 21
HCO3 UR-SCNC: 25.5 MMOL/L (ref 24–28)
HCT VFR BLD AUTO: 35.6 %
HGB BLD-MCNC: 11.4 G/DL
LYMPHOCYTES # BLD AUTO: 0.7 K/UL
LYMPHOCYTES NFR BLD: 7.2 %
MAGNESIUM SERPL-MCNC: 1.8 MG/DL
MCH RBC QN AUTO: 27.8 PG
MCHC RBC AUTO-ENTMCNC: 32 G/DL
MCV RBC AUTO: 87 FL
MODE: ABNORMAL
MONOCYTES # BLD AUTO: 0.3 K/UL
MONOCYTES NFR BLD: 3.2 %
NEUTROPHILS # BLD AUTO: 9 K/UL
NEUTROPHILS NFR BLD: 89.5 %
PCO2 BLDA: 41.4 MMHG (ref 35–45)
PH SMN: 7.4 [PH] (ref 7.35–7.45)
PLATELET # BLD AUTO: 215 K/UL
PMV BLD AUTO: 11.7 FL
PO2 BLDA: 65 MMHG (ref 80–100)
POC BE: 1 MMOL/L
POC SATURATED O2: 92 % (ref 95–100)
RBC # BLD AUTO: 4.1 M/UL
SAMPLE: ABNORMAL
SITE: ABNORMAL
WBC # BLD AUTO: 10.04 K/UL

## 2018-02-13 PROCEDURE — 63600175 PHARM REV CODE 636 W HCPCS: Performed by: NURSE PRACTITIONER

## 2018-02-13 PROCEDURE — 25000003 PHARM REV CODE 250: Performed by: NURSE PRACTITIONER

## 2018-02-13 PROCEDURE — 94640 AIRWAY INHALATION TREATMENT: CPT

## 2018-02-13 PROCEDURE — 21400001 HC TELEMETRY ROOM

## 2018-02-13 PROCEDURE — 63600175 PHARM REV CODE 636 W HCPCS: Performed by: HOSPITALIST

## 2018-02-13 PROCEDURE — 25000242 PHARM REV CODE 250 ALT 637 W/ HCPCS: Performed by: NURSE PRACTITIONER

## 2018-02-13 PROCEDURE — 94644 CONT INHLJ TX 1ST HOUR: CPT

## 2018-02-13 PROCEDURE — 63600175 PHARM REV CODE 636 W HCPCS: Performed by: EMERGENCY MEDICINE

## 2018-02-13 PROCEDURE — 27000190 HC CPAP FULL FACE MASK W/VALVE

## 2018-02-13 PROCEDURE — 25000242 PHARM REV CODE 250 ALT 637 W/ HCPCS: Performed by: EMERGENCY MEDICINE

## 2018-02-13 PROCEDURE — 25000003 PHARM REV CODE 250: Performed by: EMERGENCY MEDICINE

## 2018-02-13 PROCEDURE — 99900035 HC TECH TIME PER 15 MIN (STAT)

## 2018-02-13 PROCEDURE — 85025 COMPLETE CBC W/AUTO DIFF WBC: CPT

## 2018-02-13 PROCEDURE — 94660 CPAP INITIATION&MGMT: CPT

## 2018-02-13 PROCEDURE — 36415 COLL VENOUS BLD VENIPUNCTURE: CPT

## 2018-02-13 PROCEDURE — 27000221 HC OXYGEN, UP TO 24 HOURS

## 2018-02-13 PROCEDURE — 94761 N-INVAS EAR/PLS OXIMETRY MLT: CPT

## 2018-02-13 RX ORDER — CETIRIZINE HYDROCHLORIDE 10 MG/1
10 TABLET ORAL DAILY
Status: DISCONTINUED | OUTPATIENT
Start: 2018-02-13 | End: 2018-02-15 | Stop reason: HOSPADM

## 2018-02-13 RX ORDER — FUROSEMIDE 10 MG/ML
40 INJECTION INTRAMUSCULAR; INTRAVENOUS ONCE
Status: COMPLETED | OUTPATIENT
Start: 2018-02-13 | End: 2018-02-13

## 2018-02-13 RX ORDER — METHYLPREDNISOLONE SOD SUCC 125 MG
80 VIAL (EA) INJECTION EVERY 6 HOURS
Status: DISCONTINUED | OUTPATIENT
Start: 2018-02-13 | End: 2018-02-13

## 2018-02-13 RX ORDER — DULOXETIN HYDROCHLORIDE 30 MG/1
30 CAPSULE, DELAYED RELEASE ORAL DAILY
Status: DISCONTINUED | OUTPATIENT
Start: 2018-02-13 | End: 2018-02-15 | Stop reason: HOSPADM

## 2018-02-13 RX ORDER — PANTOPRAZOLE SODIUM 40 MG/1
40 TABLET, DELAYED RELEASE ORAL DAILY
Status: DISCONTINUED | OUTPATIENT
Start: 2018-02-13 | End: 2018-02-15 | Stop reason: HOSPADM

## 2018-02-13 RX ORDER — PREDNISOLONE ACETATE 10 MG/ML
1 SUSPENSION/ DROPS OPHTHALMIC 2 TIMES DAILY
Status: DISCONTINUED | OUTPATIENT
Start: 2018-02-13 | End: 2018-02-15 | Stop reason: HOSPADM

## 2018-02-13 RX ORDER — DORZOLAMIDE HYDROCHLORIDE AND TIMOLOL MALEATE 20; 5 MG/ML; MG/ML
1 SOLUTION/ DROPS OPHTHALMIC EVERY 12 HOURS
Status: DISCONTINUED | OUTPATIENT
Start: 2018-02-13 | End: 2018-02-15 | Stop reason: HOSPADM

## 2018-02-13 RX ORDER — IPRATROPIUM BROMIDE AND ALBUTEROL SULFATE 2.5; .5 MG/3ML; MG/3ML
3 SOLUTION RESPIRATORY (INHALATION) EVERY 6 HOURS
Status: DISCONTINUED | OUTPATIENT
Start: 2018-02-13 | End: 2018-02-13

## 2018-02-13 RX ORDER — BUDESONIDE 0.5 MG/2ML
0.5 INHALANT ORAL ONCE
Status: DISCONTINUED | OUTPATIENT
Start: 2018-02-13 | End: 2018-02-15 | Stop reason: HOSPADM

## 2018-02-13 RX ORDER — IPRATROPIUM BROMIDE AND ALBUTEROL SULFATE 2.5; .5 MG/3ML; MG/3ML
3 SOLUTION RESPIRATORY (INHALATION) EVERY 4 HOURS PRN
Status: DISCONTINUED | OUTPATIENT
Start: 2018-02-13 | End: 2018-02-15 | Stop reason: HOSPADM

## 2018-02-13 RX ORDER — ACETAMINOPHEN 325 MG/1
650 TABLET ORAL EVERY 8 HOURS PRN
Status: DISCONTINUED | OUTPATIENT
Start: 2018-02-13 | End: 2018-02-15 | Stop reason: HOSPADM

## 2018-02-13 RX ORDER — GUAIFENESIN 600 MG/1
600 TABLET, EXTENDED RELEASE ORAL 2 TIMES DAILY
Status: DISCONTINUED | OUTPATIENT
Start: 2018-02-13 | End: 2018-02-15 | Stop reason: HOSPADM

## 2018-02-13 RX ORDER — ERYTHROMYCIN 5 MG/G
OINTMENT OPHTHALMIC EVERY 8 HOURS
Status: DISCONTINUED | OUTPATIENT
Start: 2018-02-13 | End: 2018-02-15 | Stop reason: HOSPADM

## 2018-02-13 RX ORDER — CEFTRIAXONE 1 G/1
1 INJECTION, POWDER, FOR SOLUTION INTRAMUSCULAR; INTRAVENOUS
Status: DISCONTINUED | OUTPATIENT
Start: 2018-02-13 | End: 2018-02-13

## 2018-02-13 RX ORDER — ARFORMOTEROL TARTRATE 15 UG/2ML
15 SOLUTION RESPIRATORY (INHALATION) 2 TIMES DAILY
Status: DISCONTINUED | OUTPATIENT
Start: 2018-02-13 | End: 2018-02-15 | Stop reason: HOSPADM

## 2018-02-13 RX ORDER — IPRATROPIUM BROMIDE AND ALBUTEROL SULFATE 2.5; .5 MG/3ML; MG/3ML
3 SOLUTION RESPIRATORY (INHALATION) EVERY 4 HOURS
Status: DISCONTINUED | OUTPATIENT
Start: 2018-02-13 | End: 2018-02-15 | Stop reason: HOSPADM

## 2018-02-13 RX ORDER — ENOXAPARIN SODIUM 100 MG/ML
40 INJECTION SUBCUTANEOUS
Status: DISCONTINUED | OUTPATIENT
Start: 2018-02-13 | End: 2018-02-15 | Stop reason: HOSPADM

## 2018-02-13 RX ORDER — METHYLPREDNISOLONE SOD SUCC 125 MG
80 VIAL (EA) INJECTION EVERY 8 HOURS
Status: DISCONTINUED | OUTPATIENT
Start: 2018-02-13 | End: 2018-02-15 | Stop reason: HOSPADM

## 2018-02-13 RX ORDER — BUDESONIDE 0.5 MG/2ML
0.5 INHALANT ORAL EVERY 12 HOURS
Status: DISCONTINUED | OUTPATIENT
Start: 2018-02-13 | End: 2018-02-15 | Stop reason: HOSPADM

## 2018-02-13 RX ADMIN — PREDNISOLONE ACETATE 1 DROP: 10 SUSPENSION/ DROPS OPHTHALMIC at 10:02

## 2018-02-13 RX ADMIN — IPRATROPIUM BROMIDE AND ALBUTEROL SULFATE 3 ML: .5; 3 SOLUTION RESPIRATORY (INHALATION) at 07:02

## 2018-02-13 RX ADMIN — METHYLPREDNISOLONE SODIUM SUCCINATE 80 MG: 125 INJECTION, POWDER, FOR SOLUTION INTRAMUSCULAR; INTRAVENOUS at 09:02

## 2018-02-13 RX ADMIN — GUAIFENESIN 600 MG: 600 TABLET, EXTENDED RELEASE ORAL at 10:02

## 2018-02-13 RX ADMIN — ENOXAPARIN SODIUM 40 MG: 100 INJECTION SUBCUTANEOUS at 02:02

## 2018-02-13 RX ADMIN — AZITHROMYCIN MONOHYDRATE 500 MG: 500 INJECTION, POWDER, LYOPHILIZED, FOR SOLUTION INTRAVENOUS at 01:02

## 2018-02-13 RX ADMIN — PANTOPRAZOLE SODIUM 40 MG: 40 TABLET, DELAYED RELEASE ORAL at 10:02

## 2018-02-13 RX ADMIN — CETIRIZINE HYDROCHLORIDE 10 MG: 10 TABLET, FILM COATED ORAL at 10:02

## 2018-02-13 RX ADMIN — DORZOLAMIDE HYDROCHLORIDE AND TIMOLOL MALEATE 1 DROP: 20; 5 SOLUTION/ DROPS OPHTHALMIC at 10:02

## 2018-02-13 RX ADMIN — BUDESONIDE 0.5 MG: 0.5 SUSPENSION RESPIRATORY (INHALATION) at 07:02

## 2018-02-13 RX ADMIN — CEFTRIAXONE 1 G: 1 INJECTION, SOLUTION INTRAVENOUS at 05:02

## 2018-02-13 RX ADMIN — DORZOLAMIDE HYDROCHLORIDE AND TIMOLOL MALEATE 1 DROP: 20; 5 SOLUTION/ DROPS OPHTHALMIC at 09:02

## 2018-02-13 RX ADMIN — ERYTHROMYCIN: 5 OINTMENT OPHTHALMIC at 05:02

## 2018-02-13 RX ADMIN — ARFORMOTEROL TARTRATE 15 MCG: 15 SOLUTION RESPIRATORY (INHALATION) at 07:02

## 2018-02-13 RX ADMIN — GUAIFENESIN 600 MG: 600 TABLET, EXTENDED RELEASE ORAL at 09:02

## 2018-02-13 RX ADMIN — ERYTHROMYCIN: 5 OINTMENT OPHTHALMIC at 02:02

## 2018-02-13 RX ADMIN — DULOXETINE 30 MG: 30 CAPSULE, DELAYED RELEASE ORAL at 10:02

## 2018-02-13 RX ADMIN — ENOXAPARIN SODIUM 40 MG: 100 INJECTION SUBCUTANEOUS at 05:02

## 2018-02-13 RX ADMIN — ERYTHROMYCIN 1 INCH: 5 OINTMENT OPHTHALMIC at 09:02

## 2018-02-13 RX ADMIN — METHYLPREDNISOLONE SODIUM SUCCINATE 80 MG: 125 INJECTION, POWDER, FOR SOLUTION INTRAMUSCULAR; INTRAVENOUS at 05:02

## 2018-02-13 RX ADMIN — ALBUTEROL SULFATE 15 MG: 2.5 SOLUTION RESPIRATORY (INHALATION) at 12:02

## 2018-02-13 RX ADMIN — AZITHROMYCIN MONOHYDRATE 500 MG: 500 INJECTION, POWDER, LYOPHILIZED, FOR SOLUTION INTRAVENOUS at 02:02

## 2018-02-13 RX ADMIN — PREDNISOLONE ACETATE 1 DROP: 10 SUSPENSION/ DROPS OPHTHALMIC at 09:02

## 2018-02-13 RX ADMIN — IPRATROPIUM BROMIDE AND ALBUTEROL SULFATE 3 ML: .5; 3 SOLUTION RESPIRATORY (INHALATION) at 10:02

## 2018-02-13 RX ADMIN — IPRATROPIUM BROMIDE AND ALBUTEROL SULFATE 3 ML: .5; 3 SOLUTION RESPIRATORY (INHALATION) at 11:02

## 2018-02-13 RX ADMIN — IPRATROPIUM BROMIDE AND ALBUTEROL SULFATE 3 ML: .5; 3 SOLUTION RESPIRATORY (INHALATION) at 03:02

## 2018-02-13 RX ADMIN — METHYLPREDNISOLONE SODIUM SUCCINATE 80 MG: 125 INJECTION, POWDER, FOR SOLUTION INTRAMUSCULAR; INTRAVENOUS at 02:02

## 2018-02-13 RX ADMIN — FUROSEMIDE 40 MG: 10 INJECTION, SOLUTION INTRAMUSCULAR; INTRAVENOUS at 10:02

## 2018-02-13 NOTE — ASSESSMENT & PLAN NOTE
Likely exacerbated by acute illness +URI symptoms .   Improving  Continue Nebs, ICS, Steroids  monitor

## 2018-02-13 NOTE — H&P
[facility] Hospital Medicine  History & Physical    Patient Name: Jennifer Mcclain  MRN: [id number]  Admission Date: 2/12/2018  Attending Physician: Catalino Ruiz MD  Primary Care Provider: Primary Doctor No         Patient information was obtained from patient, past medical records and ER records.     Subjective:     Principal Problem:SIRS (systemic inflammatory response syndrome)    Chief Complaint:   Chief Complaint   Patient presents with    Shortness of Breath        HPI: Jennifer Mcclain is a 47 y.o. female patient who presented to the Emergency Department for SOB. Onset gradually 4 hours PTA. Symptoms are constant and moderate in severity. Pt was seen in the ED yesterday and was dx with an asthma exacerbation. Pt had a negative flu test and her lactate was normal. Pt returned to the ED today because of worsening sx.  No mitigating or exacerbating factors reported. Associated sxs include cough non productive and worsening over the past week. Fevers, wheezing, congestion, rhinorrhea, and fever. Patient denies any n/v/d, CP, chills, sore throat, HA, lightheadedness, dizziness, and all other sxs at this time. No further complaints or concerns at this time. The patient evaluated in the ER. Symptoms impoved with ED treatment, however patient still wheezing. Labs stable, mild wbc and bacteria in ua, culture pending. Blood cultures pending. Pt started on Rocephin and Azithroymicin in ER. Chest Xray Impression: NAF. Patient placed in obs for monitoring of her asthma exacerbation and will continue to cover with antx, as patient with URI symptoms, fever that continue to worsen and + patient with Ua findings.      Past Medical History:   Diagnosis Date    Asthma     Blindness     Glaucoma     Sarcoidosis        Past Surgical History:   Procedure Laterality Date    Laser SX Right        Review of patient's allergies indicates:  No Known Allergies    No current facility-administered medications on file prior  to encounter.      Current Outpatient Prescriptions on File Prior to Encounter   Medication Sig    albuterol (VENTOLIN HFA) 90 mcg/actuation inhaler Inhale 2 puffs into the lungs every 4 (four) hours as needed for Wheezing.    albuterol-ipratropium 2.5mg-0.5mg/3mL (DUO-NEB) 0.5 mg-3 mg(2.5 mg base)/3 mL nebulizer solution Take 3 mLs by nebulization every 4 (four) hours as needed for Wheezing. Rescue    cetirizine (ZYRTEC) 10 MG tablet Take 1 tablet (10 mg total) by mouth once daily.    dorzolamide-timolol 2-0.5% (COSOPT) 22.3-6.8 mg/mL ophthalmic solution Place 1 drop into both eyes every 12 (twelve) hours.    duloxetine (CYMBALTA) 20 MG capsule Take 30 mg by mouth once daily.    fluticasone-vilanterol (BREO ELLIPTA) 200-25 mcg/dose DsDv diskus inhaler Inhale 1 puff into the lungs once daily. Controller    prednisoLONE acetate (PRED FORTE) 1 % DrpS Place 1 drop into both eyes 2 (two) times daily.    predniSONE (DELTASONE) 20 MG tablet Take 2 tablets (40 mg total) by mouth once daily.    ondansetron (ZOFRAN) 4 MG tablet Take 1 tablet (4 mg total) by mouth every 8 (eight) hours as needed for Nausea.     Family History     Problem Relation (Age of Onset)    Heart disease Father    Hypertension Maternal Grandmother        Social History Main Topics    Smoking status: Never Smoker    Smokeless tobacco: Never Used    Alcohol use No    Drug use: No    Sexual activity: Not on file     Review of Systems   Constitutional: Negative for chills, diaphoresis, fatigue and fever.   HENT: Negative for congestion, sore throat and voice change.    Eyes: Positive for discharge ( left). Negative for photophobia and visual disturbance.   Respiratory: Positive for cough, shortness of breath and wheezing. Negative for stridor.    Cardiovascular: Negative for chest pain and leg swelling.   Gastrointestinal: Negative for abdominal distention, abdominal pain, constipation, diarrhea, nausea and vomiting.   Endocrine: Negative for  polydipsia, polyphagia and polyuria.   Genitourinary: Negative for difficulty urinating, dysuria, flank pain, pelvic pain, urgency and vaginal discharge.   Musculoskeletal: Negative for back pain, joint swelling, neck pain and neck stiffness.   Skin: Negative for color change and rash.   Allergic/Immunologic: Negative for immunocompromised state.   Neurological: Negative for dizziness, syncope, weakness, numbness and headaches.   Hematological: Does not bruise/bleed easily.   Psychiatric/Behavioral: Negative for agitation, behavioral problems and confusion.     Objective:     Vital Signs (Most Recent):  Temp: 99.8 °F (37.7 °C) (02/12/18 2150)  Pulse: 95 (02/13/18 0035)  Resp: 20 (02/13/18 0035)  BP: (!) 124/58 (02/13/18 0035)  SpO2: 96 % (02/13/18 0004) Vital Signs (24h Range):  Temp:  [99.8 °F (37.7 °C)-100.7 °F (38.2 °C)] 99.8 °F (37.7 °C)  Pulse:  [] 95  Resp:  [20-28] 20  SpO2:  [92 %-96 %] 96 %  BP: (109-186)/(52-69) 124/58     Weight: 122.5 kg (270 lb)  Body mass index is 46.35 kg/m².    Physical Exam   Constitutional: She is oriented to person, place, and time. She appears well-developed. No distress.   HENT:   Head: Normocephalic and atraumatic.   Nose: Nose normal.   Eyes: Left eye exhibits discharge ( purulent ). No scleral icterus.   Blind bilaterally  Left eye with cataract like cloudy covering    Neck: Normal range of motion. Neck supple. No tracheal deviation present.   Cardiovascular: Normal rate, regular rhythm, normal heart sounds and intact distal pulses.    No murmur heard.  trace edema bilateral lower ext  BNP neg     Pulmonary/Chest: Effort normal. No stridor. No respiratory distress. She has wheezes. She has no rales.   Abdominal: Soft. Bowel sounds are normal. She exhibits no distension. There is no tenderness. There is no guarding.   Obese     Genitourinary:   Genitourinary Comments: Ua culture pending     Musculoskeletal: Normal range of motion. She exhibits no edema or deformity.    Neurological: She is alert and oriented to person, place, and time. No cranial nerve deficit.   Skin: Skin is warm and dry. Capillary refill takes less than 2 seconds. No rash noted. She is not diaphoretic.   Dry flakey skin bilateral lower ext.    Psychiatric: She has a normal mood and affect. Her behavior is normal. Judgment and thought content normal.   Nursing note and vitals reviewed.          Significant Labs: All pertinent labs within the past 24 hours have been reviewed.  Results for orders placed or performed during the hospital encounter of 02/12/18   CBC auto differential   Result Value Ref Range    WBC 10.53 3.90 - 12.70 K/uL    RBC 4.23 4.00 - 5.40 M/uL    Hemoglobin 12.1 12.0 - 16.0 g/dL    Hematocrit 36.3 (L) 37.0 - 48.5 %    MCV 86 82 - 98 fL    MCH 28.6 27.0 - 31.0 pg    MCHC 33.3 32.0 - 36.0 g/dL    RDW 14.9 (H) 11.5 - 14.5 %    Platelets 216 150 - 350 K/uL    MPV 11.2 9.2 - 12.9 fL    Gran # (ANC) 8.0 (H) 1.8 - 7.7 K/uL    Lymph # 1.6 1.0 - 4.8 K/uL    Mono # 1.0 0.3 - 1.0 K/uL    Eos # 0.0 0.0 - 0.5 K/uL    Baso # 0.01 0.00 - 0.20 K/uL    Gran% 75.8 (H) 38.0 - 73.0 %    Lymph% 14.7 (L) 18.0 - 48.0 %    Mono% 9.4 4.0 - 15.0 %    Eosinophil% 0.0 0.0 - 8.0 %    Basophil% 0.1 0.0 - 1.9 %    Differential Method Automated    Comprehensive metabolic panel   Result Value Ref Range    Sodium 140 136 - 145 mmol/L    Potassium 3.8 3.5 - 5.1 mmol/L    Chloride 103 95 - 110 mmol/L    CO2 25 23 - 29 mmol/L    Glucose 103 70 - 110 mg/dL    BUN, Bld 14 6 - 20 mg/dL    Creatinine 1.0 0.5 - 1.4 mg/dL    Calcium 9.7 8.7 - 10.5 mg/dL    Total Protein 7.7 6.0 - 8.4 g/dL    Albumin 4.1 3.5 - 5.2 g/dL    Total Bilirubin 0.5 0.1 - 1.0 mg/dL    Alkaline Phosphatase 67 55 - 135 U/L    AST 43 (H) 10 - 40 U/L    ALT 30 10 - 44 U/L    Anion Gap 12 8 - 16 mmol/L    eGFR if African American >60 >60 mL/min/1.73 m^2    eGFR if non African American >60 >60 mL/min/1.73 m^2   Lactic acid, plasma #1   Result Value Ref Range     Lactate (Lactic Acid) 1.4 0.5 - 2.2 mmol/L   Urinalysis   Result Value Ref Range    Specimen UA Urine, Clean Catch     Color, UA Yellow Yellow, Straw, Evelin    Appearance, UA Clear Clear    pH, UA 6.0 5.0 - 8.0    Specific Gravity, UA 1.025 1.005 - 1.030    Protein, UA Negative Negative    Glucose, UA Negative Negative    Ketones, UA Negative Negative    Bilirubin (UA) Negative Negative    Occult Blood UA Trace (A) Negative    Nitrite, UA Negative Negative    Urobilinogen, UA Negative <2.0 EU/dL    Leukocytes, UA 1+ (A) Negative   Influenza antigen Nasopharyngeal Swab   Result Value Ref Range    Influenza A Ag, EIA Negative Negative    Influenza B Ag, EIA Negative Negative    Flu A & B Source Nasopharyngeal Swab    Pregnancy, urine rapid   Result Value Ref Range    Preg Test, Ur Negative    Urinalysis Microscopic   Result Value Ref Range    RBC, UA 0 0 - 4 /hpf    WBC, UA 8 (H) 0 - 5 /hpf    Bacteria, UA Few (A) None-Occ /hpf    Squam Epithel, UA 5 /hpf    Microscopic Comment SEE COMMENT        Significant Imaging: I have reviewed all pertinent imaging results/findings within the past 24 hours.   Imaging Results          X-Ray Chest AP Portable (Final result)  Result time 02/12/18 21:28:07    Final result by Madhu Johnson MD (02/12/18 21:28:07)                 Impression:         No acute cardiopulmonary process noted. No significant change from prior study 02/11/2018      Electronically signed by: MADHU JOHNSON MD  Date:     02/12/18  Time:    21:28              Narrative:    Exam: Chest X-ray, one view.    History: Sepsis     Findings: No infiltrate or effusion identified. Cardiomediastinal silhouette is within normal limits.                              I have personally reviewed the patients labs, imaging, ekg and discussed the patient case in detail with the Er provider    Assessment/Plan:     * SIRS (systemic inflammatory response syndrome)    Improving, monitor  Citlaliley due to asthma exacerbation, however  with worsening URI symptoms and Ua findings.   Cultures pending  Check sputum culture  Continue rocephin and azithromycin at this time, consider deescalation or further continuation pending course.             Severe asthma with acute exacerbation    Likely exacerbated by acute illness +URI symptoms .   Improving  Continue Nebs, ICS, Steroids  monitor   Consider consult to pulmonary vs OP follow up pending course         Acute bronchitis    Rocephin and azithromycin  Monitor             Eye drainage, left    Purulent drainage to left eye on exam  erythromycin ointment course  OP follow up with eye doctor   Monitor         Elevated BP without diagnosis of hypertension    Monitor trends          Morbid obesity with body mass index (BMI) of 40.0 to 49.9    Encourage weight loss,deit and exercise             GEN on CPAP    resp consult to continue           VTE Risk Mitigation         Ordered     enoxaparin injection 40 mg  Every 12 hours (non-standard times)     Route:  Subcutaneous        02/13/18 0127     Medium Risk of VTE  Once      02/13/18 0127     Place sequential compression device  Until discontinued      02/13/18 0127     Place JOSÉ MIGUEL hose  Until discontinued      02/13/18 0127     Place sequential compression device  Until discontinued      02/13/18 0109             Tree Valdez NP  Department of Hospital Medicine   [facility]

## 2018-02-13 NOTE — PROGRESS NOTES
CPAP qhs brought into room . Pt in bed eating ice. Awakening at this time. RT to place cpap if pt goes back to sleep.

## 2018-02-13 NOTE — ASSESSMENT & PLAN NOTE
Purulent drainage to left eye on exam  erythromycin ointment course  OP follow up with eye doctor   Monitor

## 2018-02-13 NOTE — ASSESSMENT & PLAN NOTE
Improving, monitor  Likley due to asthma exacerbation, however with worsening URI symptoms and Ua findings.   Cultures pending  Check sputum culture  Continue rocephin and azithromycin at this time, consider deescalation or further continuation pending course.

## 2018-02-13 NOTE — PROGRESS NOTES
[facility] Hospital Medicine  Progress Note    Patient Name: Jennifer Mcclain  MRN: [id number]  Patient Class: IP- Inpatient   Admission Date: 2/12/2018  Length of Stay: 0 days  Attending Physician: Trell Hadley MD  Primary Care Provider: Primary Doctor No    Subjective:     Principal Problem:SIRS (systemic inflammatory response syndrome)    HPI:  Jennifer Mcclain is a 47 y.o. female patient who presented to the Emergency Department for SOB. Onset gradually 4 hours PTA. Symptoms are constant and moderate in severity. Pt was seen in the ED yesterday and was dx with an asthma exacerbation. Pt had a negative flu test and her lactate was normal. Pt returned to the ED today because of worsening sx.  No mitigating or exacerbating factors reported. Associated sxs include cough non productive and worsening over the past week. Fevers, wheezing, congestion, rhinorrhea, and fever. Patient denies any n/v/d, CP, chills, sore throat, HA, lightheadedness, dizziness, and all other sxs at this time. No further complaints or concerns at this time. The patient evaluated in the ER. Symptoms impoved with ED treatment, however patient still wheezing. Labs stable, mild wbc and bacteria in ua, culture pending. Blood cultures pending. Pt started on Rocephin and Azithroymicin in ER. Chest Xray Impression: NAF. Patient placed in obs for monitoring of her asthma exacerbation and will continue to cover with antx, as patient with URI symptoms, fever that continue to worsen and + patient with Ua findings.      Hospital Course:  Patient was not improved after IV steroids, IV Abx, and breathing treatments and was changed to Inpatient status. Will reevaluate in am.    Interval History: Still wheezing, and c/o SOB. Changing to inpatient status to continue treatment    Review of Systems   Constitutional: Negative for chills, diaphoresis, fatigue and fever.   HENT: Negative for congestion, sore throat and voice change.    Eyes:  Positive for discharge ( left). Negative for photophobia and visual disturbance.   Respiratory: Positive for cough, shortness of breath and wheezing. Negative for stridor.    Cardiovascular: Negative for chest pain and leg swelling.   Gastrointestinal: Negative for abdominal distention, abdominal pain, constipation, diarrhea, nausea and vomiting.   Endocrine: Negative for polydipsia, polyphagia and polyuria.   Genitourinary: Negative for difficulty urinating, dysuria, flank pain, pelvic pain, urgency and vaginal discharge.   Musculoskeletal: Negative for back pain, joint swelling, neck pain and neck stiffness.   Skin: Negative for color change and rash.   Allergic/Immunologic: Negative for immunocompromised state.   Neurological: Negative for dizziness, syncope, weakness, numbness and headaches.   Hematological: Does not bruise/bleed easily.   Psychiatric/Behavioral: Negative for agitation, behavioral problems and confusion.     Objective:     Vital Signs (Most Recent):  Temp: 98.9 °F (37.2 °C) (02/13/18 1612)  Pulse: 85 (02/13/18 1612)  Resp: 19 (02/13/18 1612)  BP: 138/65 (02/13/18 1612)  SpO2: 97 % (02/13/18 1612) Vital Signs (24h Range):  Temp:  [98 °F (36.7 °C)-100.7 °F (38.2 °C)] 98.9 °F (37.2 °C)  Pulse:  [] 85  Resp:  [14-28] 19  SpO2:  [92 %-99 %] 97 %  BP: (107-186)/(52-81) 138/65     Weight: 122 kg (268 lb 15.4 oz)  Body mass index is 47.64 kg/m².    Intake/Output Summary (Last 24 hours) at 02/13/18 1655  Last data filed at 02/13/18 1200   Gross per 24 hour   Intake              580 ml   Output              950 ml   Net             -370 ml      Physical Exam   Constitutional: She is oriented to person, place, and time. She appears well-developed. No distress.   HENT:   Head: Normocephalic and atraumatic.   Nose: Nose normal.   Eyes: Left eye exhibits discharge ( purulent ). No scleral icterus.   Blind bilaterally  Left eye with cataract like cloudy covering    Neck: Normal range of motion. Neck  supple. No tracheal deviation present.   Cardiovascular: Normal rate, regular rhythm, normal heart sounds and intact distal pulses.    No murmur heard.  trace edema bilateral lower ext  BNP neg     Pulmonary/Chest: Effort normal. No stridor. No respiratory distress. She has wheezes. She has no rales.   Abdominal: Soft. Bowel sounds are normal. She exhibits no distension. There is no tenderness. There is no guarding.   Obese     Genitourinary:   Genitourinary Comments: Ua culture pending     Musculoskeletal: Normal range of motion. She exhibits no edema or deformity.   Neurological: She is alert and oriented to person, place, and time. No cranial nerve deficit.   Skin: Skin is warm and dry. Capillary refill takes less than 2 seconds. No rash noted. She is not diaphoretic.   Dry flakey skin bilateral lower ext.    Psychiatric: She has a normal mood and affect. Her behavior is normal. Judgment and thought content normal.   Nursing note and vitals reviewed.    Significant Labs:   CBC:   Recent Labs  Lab 02/12/18 2109 02/13/18  0558   WBC 10.53 10.04   HGB 12.1 11.4*   HCT 36.3* 35.6*    215     CMP:   Recent Labs  Lab 02/12/18  2109      K 3.8      CO2 25      BUN 14   CREATININE 1.0   CALCIUM 9.7   PROT 7.7   ALBUMIN 4.1   BILITOT 0.5   ALKPHOS 67   AST 43*   ALT 30   ANIONGAP 12   EGFRNONAA >60       Significant Imaging: I have reviewed all pertinent imaging results/findings within the past 24 hours.   Imaging Results          X-Ray Chest AP Portable (Final result)  Result time 02/12/18 21:28:07    Final result by Madhu Johnson MD (02/12/18 21:28:07)                 Impression:         No acute cardiopulmonary process noted. No significant change from prior study 02/11/2018      Electronically signed by: MADHU JOHNSON MD  Date:     02/12/18  Time:    21:28              Narrative:    Exam: Chest X-ray, one view.    History: Sepsis     Findings: No infiltrate or effusion identified.  Cardiomediastinal silhouette is within normal limits.                                Assessment/Plan:      * SIRS (systemic inflammatory response syndrome)    Improving, monitor  Likley due to asthma exacerbation, however with worsening URI symptoms and Ua findings.   Cultures pending  Check sputum culture  Continue rocephin and azithromycin at this time, consider deescalation or further continuation pending course.             Severe asthma with acute exacerbation    Likely exacerbated by acute illness +URI symptoms .   Improving  Continue Nebs, ICS, Steroids  monitor         Elevated BP without diagnosis of hypertension    Monitor trends          Eye drainage, left    Purulent drainage to left eye on exam  erythromycin ointment course  OP follow up with eye doctor   Monitor         Acute bronchitis    Rocephin and azithromycin  Monitor             Morbid obesity with body mass index (BMI) of 40.0 to 49.9    Encourage weight loss, diet, and exercise             GEN on CPAP    resp consult to continue           VTE Risk Mitigation         Ordered     enoxaparin injection 40 mg  Every 12 hours (non-standard times)     Route:  Subcutaneous        02/13/18 0127     Medium Risk of VTE  Once      02/13/18 0127     Place sequential compression device  Until discontinued      02/13/18 0127     Place JOSÉ MIGUEL hose  Until discontinued      02/13/18 0127     Place sequential compression device  Until discontinued      02/13/18 0109        Sally Flores NP  Department of Hospital Medicine   [facility]

## 2018-02-13 NOTE — ED PROVIDER NOTES
SCRIBE #1 NOTE: I, Tree Weber, am scribing for, and in the presence of, Lexie Wilkerson Do, MD. I have scribed the entire note.      History      Chief Complaint   Patient presents with    Shortness of Breath       Review of patient's allergies indicates:  No Known Allergies     HPI   HPI    2/12/2018, 8:15 PM   History obtained from the patient      History of Present Illness: Jennifer Mcclain is a 47 y.o. female patient who presents to the Emergency Department for SOB which onset gradually 4 hours PTA. Symptoms are constant and moderate in severity. Pt was seen in the ED yesterday and was dx with an asthma exacerbation. Pt had a negative flu test and her lactate was normal. Pt returned to the ED today because of worsening sx.  No mitigating or exacerbating factors reported. Associated sxs include cough, wheezing, congestion, rhinorrhea, and fever. Patient denies any n/v/d, CP, chills, sore throat, HA, lightheadedness, dizziness, and all other sxs at this time. No further complaints or concerns at this time.         Arrival mode: Personal vehicle      PCP: Primary Doctor No       Past Medical History:  Past Medical History:   Diagnosis Date    Asthma     Blindness     Glaucoma     Sarcoidosis        Past Surgical History:  Past Surgical History:   Procedure Laterality Date    Laser SX Right          Family History:  Family History   Problem Relation Age of Onset    Heart disease Father     Hypertension Maternal Grandmother        Social History:  Social History     Social History Main Topics    Smoking status: Never Smoker    Smokeless tobacco: Never Used    Alcohol use No    Drug use: No    Sexual activity: Unknown       ROS   Review of Systems   Constitutional: Negative for chills and fever.   HENT: Positive for congestion and rhinorrhea. Negative for ear pain and sore throat.    Respiratory: Positive for cough, shortness of breath and wheezing.    Cardiovascular: Negative for chest pain.    Gastrointestinal: Negative for diarrhea, nausea and vomiting.   Genitourinary: Negative for dysuria.   Musculoskeletal: Negative for back pain.   Skin: Negative for rash.   Neurological: Negative for dizziness, weakness, light-headedness, numbness and headaches.   Hematological: Does not bruise/bleed easily.       Physical Exam      Initial Vitals [02/12/18 1936]   BP Pulse Resp Temp SpO2   126/69 (!) 113 (!) 24 (!) 100.7 °F (38.2 °C) (!) 92 %      MAP       88          Physical Exam  Nursing Notes and Vital Signs Reviewed.  Constitutional: Patient is in mild distress. Well-developed and well-nourished.  Head: Atraumatic. Normocephalic.  Eyes: PERRL. EOM intact. Conjunctivae are not pale. No scleral icterus. Pt has drainage from left eye,  Pt is blind in both eyes.  ENT: Mucous membranes are moist. Oropharynx is clear and symmetric.    Neck: Supple. Full ROM. No lymphadenopathy.  Cardiovascular: Tachycardic. Regular rhythm. No murmurs, rubs, or gallops. Distal pulses are 2+ and symmetric.  Pulmonary/Chest: Mild respiratory distress. Audible wheezing. Wheezing in bilateral lung fields.   Abdominal: Soft and non-distended.  There is no tenderness.  No rebound, guarding, or rigidity.   Musculoskeletal: Moves all extremities. No obvious deformities. No edema.   Skin: Warm and dry.  Neurological:  Alert, awake, and appropriate.  Normal speech.  No acute focal neurological deficits are appreciated.  Psychiatric: Normal affect. Good eye contact. Appropriate in content.    ED Course    Critical Care  Date/Time: 2/13/2018 1:23 AM  Performed by: KANCHAN PIERRE  Authorized by: KANCHAN PIERRE   Direct patient critical care time: 5 minutes  Additional history critical care time: 5 minutes  Ordering / reviewing critical care time: 6 minutes  Documentation critical care time: 5 minutes  Consulting other physicians critical care time: 5 minutes  Consult with family critical care time: 6 minutes  Total critical care time  "(exclusive of procedural time) : 32 minutes  Critical care was necessary to treat or prevent imminent or life-threatening deterioration of the following conditions: respiratory failure.  Critical care was time spent personally by me on the following activities: development of treatment plan with patient or surrogate, discussions with consultants, interpretation of cardiac output measurements, evaluation of patient's response to treatment, examination of patient, obtaining history from patient or surrogate, ordering and performing treatments and interventions, ordering and review of laboratory studies, ordering and review of radiographic studies, pulse oximetry, re-evaluation of patient's condition and review of old charts.        ED Vital Signs:  Vitals:    02/12/18 1936 02/12/18 2000 02/12/18 2043 02/12/18 2047   BP: 126/69      Pulse: (!) 113 100 93 100   Resp: (!) 24  (!) 24 (!) 28   Temp: (!) 100.7 °F (38.2 °C)      TempSrc: Oral      SpO2: (!) 92%  (!) 93% 95%   Weight: 122.5 kg (270 lb)      Height: 5' 4" (1.626 m)       02/12/18 2051 02/12/18 2150 02/12/18 2330 02/13/18 0004   BP:  (!) 186/68 (!) 109/52    Pulse: 97 100 98 96   Resp: (!) 26 20 20 (!) 22   Temp:  99.8 °F (37.7 °C)     TempSrc:  Oral     SpO2:    96%   Weight:       Height:        02/13/18 0035   BP: (!) 124/58   Pulse: 95   Resp: 20   Temp:    TempSrc:    SpO2:    Weight:    Height:        Abnormal Lab Results:  Labs Reviewed   CBC W/ AUTO DIFFERENTIAL - Abnormal; Notable for the following:        Result Value    Hematocrit 36.3 (*)     RDW 14.9 (*)     Gran # (ANC) 8.0 (*)     Gran% 75.8 (*)     Lymph% 14.7 (*)     All other components within normal limits   COMPREHENSIVE METABOLIC PANEL - Abnormal; Notable for the following:     AST 43 (*)     All other components within normal limits   URINALYSIS - Abnormal; Notable for the following:     Occult Blood UA Trace (*)     Leukocytes, UA 1+ (*)     All other components within normal limits "   URINALYSIS MICROSCOPIC - Abnormal; Notable for the following:     WBC, UA 8 (*)     Bacteria, UA Few (*)     All other components within normal limits   CULTURE, BLOOD   CULTURE, BLOOD   CULTURE, URINE   LACTIC ACID, PLASMA   INFLUENZA A AND B ANTIGEN   PREGNANCY TEST, URINE RAPID   LACTIC ACID, PLASMA   LACTIC ACID, PLASMA   B-TYPE NATRIURETIC PEPTIDE        All Lab Results:  Results for orders placed or performed during the hospital encounter of 02/12/18   CBC auto differential   Result Value Ref Range    WBC 10.53 3.90 - 12.70 K/uL    RBC 4.23 4.00 - 5.40 M/uL    Hemoglobin 12.1 12.0 - 16.0 g/dL    Hematocrit 36.3 (L) 37.0 - 48.5 %    MCV 86 82 - 98 fL    MCH 28.6 27.0 - 31.0 pg    MCHC 33.3 32.0 - 36.0 g/dL    RDW 14.9 (H) 11.5 - 14.5 %    Platelets 216 150 - 350 K/uL    MPV 11.2 9.2 - 12.9 fL    Gran # (ANC) 8.0 (H) 1.8 - 7.7 K/uL    Lymph # 1.6 1.0 - 4.8 K/uL    Mono # 1.0 0.3 - 1.0 K/uL    Eos # 0.0 0.0 - 0.5 K/uL    Baso # 0.01 0.00 - 0.20 K/uL    Gran% 75.8 (H) 38.0 - 73.0 %    Lymph% 14.7 (L) 18.0 - 48.0 %    Mono% 9.4 4.0 - 15.0 %    Eosinophil% 0.0 0.0 - 8.0 %    Basophil% 0.1 0.0 - 1.9 %    Differential Method Automated    Comprehensive metabolic panel   Result Value Ref Range    Sodium 140 136 - 145 mmol/L    Potassium 3.8 3.5 - 5.1 mmol/L    Chloride 103 95 - 110 mmol/L    CO2 25 23 - 29 mmol/L    Glucose 103 70 - 110 mg/dL    BUN, Bld 14 6 - 20 mg/dL    Creatinine 1.0 0.5 - 1.4 mg/dL    Calcium 9.7 8.7 - 10.5 mg/dL    Total Protein 7.7 6.0 - 8.4 g/dL    Albumin 4.1 3.5 - 5.2 g/dL    Total Bilirubin 0.5 0.1 - 1.0 mg/dL    Alkaline Phosphatase 67 55 - 135 U/L    AST 43 (H) 10 - 40 U/L    ALT 30 10 - 44 U/L    Anion Gap 12 8 - 16 mmol/L    eGFR if African American >60 >60 mL/min/1.73 m^2    eGFR if non African American >60 >60 mL/min/1.73 m^2   Lactic acid, plasma #1   Result Value Ref Range    Lactate (Lactic Acid) 1.4 0.5 - 2.2 mmol/L   Urinalysis   Result Value Ref Range    Specimen UA Urine, Clean  Catch     Color, UA Yellow Yellow, Straw, Evelin    Appearance, UA Clear Clear    pH, UA 6.0 5.0 - 8.0    Specific Gravity, UA 1.025 1.005 - 1.030    Protein, UA Negative Negative    Glucose, UA Negative Negative    Ketones, UA Negative Negative    Bilirubin (UA) Negative Negative    Occult Blood UA Trace (A) Negative    Nitrite, UA Negative Negative    Urobilinogen, UA Negative <2.0 EU/dL    Leukocytes, UA 1+ (A) Negative   Influenza antigen Nasopharyngeal Swab   Result Value Ref Range    Influenza A Ag, EIA Negative Negative    Influenza B Ag, EIA Negative Negative    Flu A & B Source Nasopharyngeal Swab    Pregnancy, urine rapid   Result Value Ref Range    Preg Test, Ur Negative    Urinalysis Microscopic   Result Value Ref Range    RBC, UA 0 0 - 4 /hpf    WBC, UA 8 (H) 0 - 5 /hpf    Bacteria, UA Few (A) None-Occ /hpf    Squam Epithel, UA 5 /hpf    Microscopic Comment SEE COMMENT          Imaging Results:  Imaging Results          X-Ray Chest AP Portable (Final result)  Result time 02/12/18 21:28:07    Final result by Madhu Johnson MD (02/12/18 21:28:07)                 Impression:         No acute cardiopulmonary process noted. No significant change from prior study 02/11/2018      Electronically signed by: MADHU JOHNSON MD  Date:     02/12/18  Time:    21:28              Narrative:    Exam: Chest X-ray, one view.    History: Sepsis     Findings: No infiltrate or effusion identified. Cardiomediastinal silhouette is within normal limits.                             The EKG was ordered, reviewed, and independently interpreted by the ED provider.  Interpretation time: 21:02  Rate: 107 BPM  Rhythm: sinus tachycardia  Interpretation: Otherwise normal ECG. No STEMI.             The Emergency Provider reviewed the vital signs and test results, which are outlined above.    ED Discussion     1:06 AM: Discussed case with Tree Valdez NP ([facility]). Dr. Wolff agrees with current care and management of pt and  accepts admission.   Admitting Service: Hospital medicine   Admitting Physician: Dr. Wolff  Admit to: Telemetry     1:06 AM: Re-evaluated pt. Pt is still having audible wheezing after 3 duonebs and then 1 hours albuterol.  Cannot walk without getting sob. . Pt has sarcoidosis.  Will admit for steroids, fluids, albuterol nebs and antibiotics.  I have discussed test results, shared treatment plan, and the need for admission with patient and family at bedside. Pt and family express understanding at this time and agree with all information. All questions answered. Pt and family have no further questions or concerns at this time. Pt is ready for admit.          ED Medication(s):  Medications   dextromethorphan-guaifenesin  mg/5 ml liquid 10 mL (not administered)   azithromycin 500 mg in 0.9 % sodium chloride 250 mL IVPB (ready to mix system) (not administered)   erythromycin 5 mg/gram (0.5 %) ophthalmic ointment (not administered)   albuterol-ipratropium 2.5mg-0.5mg/3mL nebulizer solution 3 mL (3 mLs Nebulization Given 2/12/18 2051)   methylPREDNISolone sodium succinate injection 125 mg (125 mg Intravenous Given 2/12/18 2056)   cefTRIAXone injection 1 g (1 g Intravenous Given 2/12/18 2133)   acetaminophen tablet 1,000 mg (1,000 mg Oral Given 2/12/18 2041)   ketorolac injection 30 mg (30 mg Intravenous Given 2/12/18 2334)   hydrocodone-acetaminophen 5-325mg per tablet 1 tablet (1 tablet Oral Given 2/12/18 2334)   albuterol sulfate nebulizer solution 15 mg (15 mg Nebulization Given 2/13/18 0004)       New Prescriptions    No medications on file             Medical Decision Making    Medical Decision Making:   Clinical Tests:   Lab Tests: Ordered and Reviewed  Radiological Study: Reviewed and Ordered  Medical Tests: Ordered and Reviewed           Scribe Attestation:   Scribe #1: I performed the above scribed service and the documentation accurately describes the services I performed. I attest to the accuracy of the  note.    Attending:   Physician Attestation Statement for Scribe #1: I, Lexie Wilkerson Do, MD, personally performed the services described in this documentation, as scribed by Tree Weber, in my presence, and it is both accurate and complete.          Clinical Impression       ICD-10-CM ICD-9-CM   1. Acute bronchitis, unspecified organism J20.9 466.0   2. Chest pain R07.9 786.50   3. Acute respiratory distress R06.03 518.82   4. Conjunctivitis, unspecified conjunctivitis type, unspecified laterality H10.9 372.30       Disposition:   Disposition: Placed in Observation  Condition: Fair         Lexie Wilkerson Do, MD  02/13/18 0124       Lexie Wilkerson Do, MD  02/13/18 0125       Lexie Wilkerson Do, MD  02/13/18 0126

## 2018-02-13 NOTE — ED NOTES
Pt. Positioned to comfort, respirations even and unlabored, remains on continuous monitor, non productive cough noted, states she continues to feel ill, NAD noted, results reviewed,  plan of care continued.

## 2018-02-13 NOTE — ED NOTES
Pt. Noted lying in bed resting to comfort, respirations even and unlabored, remains on continuous monitor, RA sat. 93%,  intermittent cough noted, family remains at bedside, plan of care continued.

## 2018-02-13 NOTE — SUBJECTIVE & OBJECTIVE
Interval History: Still wheezing, and c/o SOB. Changing to inpatient status to continue treatment    Review of Systems   Constitutional: Negative for chills, diaphoresis, fatigue and fever.   HENT: Negative for congestion, sore throat and voice change.    Eyes: Positive for discharge ( left). Negative for photophobia and visual disturbance.   Respiratory: Positive for cough, shortness of breath and wheezing. Negative for stridor.    Cardiovascular: Negative for chest pain and leg swelling.   Gastrointestinal: Negative for abdominal distention, abdominal pain, constipation, diarrhea, nausea and vomiting.   Endocrine: Negative for polydipsia, polyphagia and polyuria.   Genitourinary: Negative for difficulty urinating, dysuria, flank pain, pelvic pain, urgency and vaginal discharge.   Musculoskeletal: Negative for back pain, joint swelling, neck pain and neck stiffness.   Skin: Negative for color change and rash.   Allergic/Immunologic: Negative for immunocompromised state.   Neurological: Negative for dizziness, syncope, weakness, numbness and headaches.   Hematological: Does not bruise/bleed easily.   Psychiatric/Behavioral: Negative for agitation, behavioral problems and confusion.     Objective:     Vital Signs (Most Recent):  Temp: 98.9 °F (37.2 °C) (02/13/18 1612)  Pulse: 85 (02/13/18 1612)  Resp: 19 (02/13/18 1612)  BP: 138/65 (02/13/18 1612)  SpO2: 97 % (02/13/18 1612) Vital Signs (24h Range):  Temp:  [98 °F (36.7 °C)-100.7 °F (38.2 °C)] 98.9 °F (37.2 °C)  Pulse:  [] 85  Resp:  [14-28] 19  SpO2:  [92 %-99 %] 97 %  BP: (107-186)/(52-81) 138/65     Weight: 122 kg (268 lb 15.4 oz)  Body mass index is 47.64 kg/m².    Intake/Output Summary (Last 24 hours) at 02/13/18 1655  Last data filed at 02/13/18 1200   Gross per 24 hour   Intake              580 ml   Output              950 ml   Net             -370 ml      Physical Exam   Constitutional: She is oriented to person, place, and time. She appears  well-developed. No distress.   HENT:   Head: Normocephalic and atraumatic.   Nose: Nose normal.   Eyes: Left eye exhibits discharge ( purulent ). No scleral icterus.   Blind bilaterally  Left eye with cataract like cloudy covering    Neck: Normal range of motion. Neck supple. No tracheal deviation present.   Cardiovascular: Normal rate, regular rhythm, normal heart sounds and intact distal pulses.    No murmur heard.  trace edema bilateral lower ext  BNP neg     Pulmonary/Chest: Effort normal. No stridor. No respiratory distress. She has wheezes. She has no rales.   Abdominal: Soft. Bowel sounds are normal. She exhibits no distension. There is no tenderness. There is no guarding.   Obese     Genitourinary:   Genitourinary Comments: Ua culture pending     Musculoskeletal: Normal range of motion. She exhibits no edema or deformity.   Neurological: She is alert and oriented to person, place, and time. No cranial nerve deficit.   Skin: Skin is warm and dry. Capillary refill takes less than 2 seconds. No rash noted. She is not diaphoretic.   Dry flakey skin bilateral lower ext.    Psychiatric: She has a normal mood and affect. Her behavior is normal. Judgment and thought content normal.   Nursing note and vitals reviewed.    Significant Labs:   CBC:   Recent Labs  Lab 02/12/18  2109 02/13/18  0558   WBC 10.53 10.04   HGB 12.1 11.4*   HCT 36.3* 35.6*    215     CMP:   Recent Labs  Lab 02/12/18  2109      K 3.8      CO2 25      BUN 14   CREATININE 1.0   CALCIUM 9.7   PROT 7.7   ALBUMIN 4.1   BILITOT 0.5   ALKPHOS 67   AST 43*   ALT 30   ANIONGAP 12   EGFRNONAA >60       Significant Imaging: I have reviewed all pertinent imaging results/findings within the past 24 hours.   Imaging Results          X-Ray Chest AP Portable (Final result)  Result time 02/12/18 21:28:07    Final result by Madhu Johnson MD (02/12/18 21:28:07)                 Impression:         No acute cardiopulmonary process  noted. No significant change from prior study 02/11/2018      Electronically signed by: RODRIGUEZ GONZALES MD  Date:     02/12/18  Time:    21:28              Narrative:    Exam: Chest X-ray, one view.    History: Sepsis     Findings: No infiltrate or effusion identified. Cardiomediastinal silhouette is within normal limits.

## 2018-02-13 NOTE — PROGRESS NOTES
Pulmonary Disease Management Inpatient Evaluation    Admitting Diagnosis:    Current Pulmonologist: PAULO Martin NP    Pulse:  76 bpm    SpO2:   95 %     Breath Sounds: expiratory Wheezing    Inpatient Respiratory Treatment: Medications: Duoneb Q  Brovona BID , Pulmicort Q12, Nasal Cannula 2  L/M,   Home CPAP/BIPAP: none    Home O2:  none    Smoking History: none    Supplier of Home CPAP/BIPAP/Oxygen: [employer] CPAP 10 cmH2O    Home Respiratory Meds: Albuterol,  Duoneb, Breo      Current or Past PFTs: Yes or No   Date:   FVC:  FEV1:61% FEV1/FVC:74%  TLC:  DLCO:       Education on Respiratory Issues:      The patient was visited and informed of Pulmonary Disease Management Program. Briefly discussed action plan for Asthma education including  triggers,and peak flow monitoring once patient is scheduled in program.   Patient will be scheduled into program once discharged.

## 2018-02-13 NOTE — ED NOTES
Family and patient informed that patient is to be admitted per Dr. Coelho. Plan of care continued.

## 2018-02-13 NOTE — HPI
Jennifer Mcclain is a 47 y.o. female patient who presented to the Emergency Department for SOB. Onset gradually 4 hours PTA. Symptoms are constant and moderate in severity. Pt was seen in the ED yesterday and was dx with an asthma exacerbation. Pt had a negative flu test and her lactate was normal. Pt returned to the ED today because of worsening sx.  No mitigating or exacerbating factors reported. Associated sxs include cough non productive and worsening over the past week. Fevers, wheezing, congestion, rhinorrhea, and fever. Patient denies any n/v/d, CP, chills, sore throat, HA, lightheadedness, dizziness, and all other sxs at this time. No further complaints or concerns at this time. The patient evaluated in the ER. Symptoms impoved with ED treatment, however patient still wheezing. Labs stable, mild wbc and bacteria in ua, culture pending. Blood cultures pending. Pt started on Rocephin and Azithroymicin in ER. Chest Xray Impression: NAF. Patient placed in obs for monitoring of her asthma exacerbation and will continue to cover with antx, as patient with URI symptoms, fever that continue to worsen and + patient with Ua findings.

## 2018-02-13 NOTE — HOSPITAL COURSE
Patient was not improved after IV steroids, IV Abx, and breathing treatments and was changed to Inpatient status. She had severe wheezing during admission. Only after several days of treatment was she back to her baseline. She was given 2 days of IV Lasix and breathing improved. Rx for Levaquin and steroid taper. She qualified for home O2 with activity sat of 86%. No wheezing the day of d/c. She has a follow-up appointment with Nakul 2/21/18. She lives in [place], but comes to  for pulmonary appointments. Patient seen and examined and deemed stable for d/c.

## 2018-02-13 NOTE — ED NOTES
Pt. Assisted into wheelchair by ED staff, family assists patient to rock restroom, NAD noted upon transport. Plan of care continued.

## 2018-02-13 NOTE — ED NOTES
Pt. C/o sudden onset of diarrhea. Dr. Coelho informed. Pt. Provided noe pad and adult brief to comfort as needed, plan of care continued.

## 2018-02-13 NOTE — PLAN OF CARE
Problem: Patient Care Overview  Goal: Plan of Care Review  Outcome: Ongoing (interventions implemented as appropriate)  POC reviewed    Comments: Patient resting in bed at this time, IV antibiotics infusing, eye gtts administered this shift as ordered.  IV lasix this shift given and output of urine thus far is 500mls. Patient is still NPO as of now, cardiac monitor in place, nasal cannula 2 L oxygen at this time post respiratory treatmen.  No issues with ambulation in the room despite vision loss. Dry skin to knee caps, audble wheeze is WNL for patient status from home. Has communicated with family/friends on the telephone today and enjoys listening to tv. IV to righ t AC infusing at this time, otherwise is SL.

## 2018-02-13 NOTE — PLAN OF CARE
Problem: Patient Care Overview  Goal: Plan of Care Review  Outcome: Ongoing (interventions implemented as appropriate)  Patient admitted overnight, iv abx, iv steroids and breathing tx given, patient is SR on monitor, SOB with minimal exertion, bed alarm active - no falls and no complaints of pain this shift.  Will continue to monitor.

## 2018-02-13 NOTE — NURSING
patient arrived to room, ambulated from stretcher to bed with assistance, patient usually walks with cane but did not bring one this admission.  Patient blind, oriented to room and hourly rounding.  Given nurse call button and verified patient can call nurses station. Patient given o2 extension tubing d/t SOB with ambulation.  IV abx finished infusing.  Patient placed on monitor, SR on monitor. Patient bed alarm activated for fall precautions, door open, will continue to monitor.

## 2018-02-13 NOTE — SUBJECTIVE & OBJECTIVE
Past Medical History:   Diagnosis Date    Asthma     Blindness     Glaucoma     Sarcoidosis        Past Surgical History:   Procedure Laterality Date    Laser SX Right        Review of patient's allergies indicates:  No Known Allergies    No current facility-administered medications on file prior to encounter.      Current Outpatient Prescriptions on File Prior to Encounter   Medication Sig    albuterol (VENTOLIN HFA) 90 mcg/actuation inhaler Inhale 2 puffs into the lungs every 4 (four) hours as needed for Wheezing.    albuterol-ipratropium 2.5mg-0.5mg/3mL (DUO-NEB) 0.5 mg-3 mg(2.5 mg base)/3 mL nebulizer solution Take 3 mLs by nebulization every 4 (four) hours as needed for Wheezing. Rescue    cetirizine (ZYRTEC) 10 MG tablet Take 1 tablet (10 mg total) by mouth once daily.    dorzolamide-timolol 2-0.5% (COSOPT) 22.3-6.8 mg/mL ophthalmic solution Place 1 drop into both eyes every 12 (twelve) hours.    duloxetine (CYMBALTA) 20 MG capsule Take 30 mg by mouth once daily.    fluticasone-vilanterol (BREO ELLIPTA) 200-25 mcg/dose DsDv diskus inhaler Inhale 1 puff into the lungs once daily. Controller    prednisoLONE acetate (PRED FORTE) 1 % DrpS Place 1 drop into both eyes 2 (two) times daily.    predniSONE (DELTASONE) 20 MG tablet Take 2 tablets (40 mg total) by mouth once daily.    ondansetron (ZOFRAN) 4 MG tablet Take 1 tablet (4 mg total) by mouth every 8 (eight) hours as needed for Nausea.     Family History     Problem Relation (Age of Onset)    Heart disease Father    Hypertension Maternal Grandmother        Social History Main Topics    Smoking status: Never Smoker    Smokeless tobacco: Never Used    Alcohol use No    Drug use: No    Sexual activity: Not on file     Review of Systems   Constitutional: Negative for chills, diaphoresis, fatigue and fever.   HENT: Negative for congestion, sore throat and voice change.    Eyes: Positive for discharge ( left). Negative for photophobia and visual  disturbance.   Respiratory: Positive for cough, shortness of breath and wheezing. Negative for stridor.    Cardiovascular: Negative for chest pain and leg swelling.   Gastrointestinal: Negative for abdominal distention, abdominal pain, constipation, diarrhea, nausea and vomiting.   Endocrine: Negative for polydipsia, polyphagia and polyuria.   Genitourinary: Negative for difficulty urinating, dysuria, flank pain, pelvic pain, urgency and vaginal discharge.   Musculoskeletal: Negative for back pain, joint swelling, neck pain and neck stiffness.   Skin: Negative for color change and rash.   Allergic/Immunologic: Negative for immunocompromised state.   Neurological: Negative for dizziness, syncope, weakness, numbness and headaches.   Hematological: Does not bruise/bleed easily.   Psychiatric/Behavioral: Negative for agitation, behavioral problems and confusion.     Objective:     Vital Signs (Most Recent):  Temp: 99.8 °F (37.7 °C) (02/12/18 2150)  Pulse: 95 (02/13/18 0035)  Resp: 20 (02/13/18 0035)  BP: (!) 124/58 (02/13/18 0035)  SpO2: 96 % (02/13/18 0004) Vital Signs (24h Range):  Temp:  [99.8 °F (37.7 °C)-100.7 °F (38.2 °C)] 99.8 °F (37.7 °C)  Pulse:  [] 95  Resp:  [20-28] 20  SpO2:  [92 %-96 %] 96 %  BP: (109-186)/(52-69) 124/58     Weight: 122.5 kg (270 lb)  Body mass index is 46.35 kg/m².    Physical Exam   Constitutional: She is oriented to person, place, and time. She appears well-developed. No distress.   HENT:   Head: Normocephalic and atraumatic.   Nose: Nose normal.   Eyes: Left eye exhibits discharge ( purulent ). No scleral icterus.   Blind bilaterally  Left eye with cataract like cloudy covering    Neck: Normal range of motion. Neck supple. No tracheal deviation present.   Cardiovascular: Normal rate, regular rhythm, normal heart sounds and intact distal pulses.    No murmur heard.  trace edema bilateral lower ext  BNP neg     Pulmonary/Chest: Effort normal. No stridor. No respiratory distress. She  has wheezes. She has no rales.   Abdominal: Soft. Bowel sounds are normal. She exhibits no distension. There is no tenderness. There is no guarding.   Obese     Genitourinary:   Genitourinary Comments: Ua culture pending     Musculoskeletal: Normal range of motion. She exhibits no edema or deformity.   Neurological: She is alert and oriented to person, place, and time. No cranial nerve deficit.   Skin: Skin is warm and dry. Capillary refill takes less than 2 seconds. No rash noted. She is not diaphoretic.   Dry flakey skin bilateral lower ext.    Psychiatric: She has a normal mood and affect. Her behavior is normal. Judgment and thought content normal.   Nursing note and vitals reviewed.          Significant Labs: All pertinent labs within the past 24 hours have been reviewed.  Results for orders placed or performed during the hospital encounter of 02/12/18   CBC auto differential   Result Value Ref Range    WBC 10.53 3.90 - 12.70 K/uL    RBC 4.23 4.00 - 5.40 M/uL    Hemoglobin 12.1 12.0 - 16.0 g/dL    Hematocrit 36.3 (L) 37.0 - 48.5 %    MCV 86 82 - 98 fL    MCH 28.6 27.0 - 31.0 pg    MCHC 33.3 32.0 - 36.0 g/dL    RDW 14.9 (H) 11.5 - 14.5 %    Platelets 216 150 - 350 K/uL    MPV 11.2 9.2 - 12.9 fL    Gran # (ANC) 8.0 (H) 1.8 - 7.7 K/uL    Lymph # 1.6 1.0 - 4.8 K/uL    Mono # 1.0 0.3 - 1.0 K/uL    Eos # 0.0 0.0 - 0.5 K/uL    Baso # 0.01 0.00 - 0.20 K/uL    Gran% 75.8 (H) 38.0 - 73.0 %    Lymph% 14.7 (L) 18.0 - 48.0 %    Mono% 9.4 4.0 - 15.0 %    Eosinophil% 0.0 0.0 - 8.0 %    Basophil% 0.1 0.0 - 1.9 %    Differential Method Automated    Comprehensive metabolic panel   Result Value Ref Range    Sodium 140 136 - 145 mmol/L    Potassium 3.8 3.5 - 5.1 mmol/L    Chloride 103 95 - 110 mmol/L    CO2 25 23 - 29 mmol/L    Glucose 103 70 - 110 mg/dL    BUN, Bld 14 6 - 20 mg/dL    Creatinine 1.0 0.5 - 1.4 mg/dL    Calcium 9.7 8.7 - 10.5 mg/dL    Total Protein 7.7 6.0 - 8.4 g/dL    Albumin 4.1 3.5 - 5.2 g/dL    Total Bilirubin  0.5 0.1 - 1.0 mg/dL    Alkaline Phosphatase 67 55 - 135 U/L    AST 43 (H) 10 - 40 U/L    ALT 30 10 - 44 U/L    Anion Gap 12 8 - 16 mmol/L    eGFR if African American >60 >60 mL/min/1.73 m^2    eGFR if non African American >60 >60 mL/min/1.73 m^2   Lactic acid, plasma #1   Result Value Ref Range    Lactate (Lactic Acid) 1.4 0.5 - 2.2 mmol/L   Urinalysis   Result Value Ref Range    Specimen UA Urine, Clean Catch     Color, UA Yellow Yellow, Straw, Evelin    Appearance, UA Clear Clear    pH, UA 6.0 5.0 - 8.0    Specific Gravity, UA 1.025 1.005 - 1.030    Protein, UA Negative Negative    Glucose, UA Negative Negative    Ketones, UA Negative Negative    Bilirubin (UA) Negative Negative    Occult Blood UA Trace (A) Negative    Nitrite, UA Negative Negative    Urobilinogen, UA Negative <2.0 EU/dL    Leukocytes, UA 1+ (A) Negative   Influenza antigen Nasopharyngeal Swab   Result Value Ref Range    Influenza A Ag, EIA Negative Negative    Influenza B Ag, EIA Negative Negative    Flu A & B Source Nasopharyngeal Swab    Pregnancy, urine rapid   Result Value Ref Range    Preg Test, Ur Negative    Urinalysis Microscopic   Result Value Ref Range    RBC, UA 0 0 - 4 /hpf    WBC, UA 8 (H) 0 - 5 /hpf    Bacteria, UA Few (A) None-Occ /hpf    Squam Epithel, UA 5 /hpf    Microscopic Comment SEE COMMENT        Significant Imaging: I have reviewed all pertinent imaging results/findings within the past 24 hours.   Imaging Results          X-Ray Chest AP Portable (Final result)  Result time 02/12/18 21:28:07    Final result by Madhu Johnson MD (02/12/18 21:28:07)                 Impression:         No acute cardiopulmonary process noted. No significant change from prior study 02/11/2018      Electronically signed by: MADHU JOHNSON MD  Date:     02/12/18  Time:    21:28              Narrative:    Exam: Chest X-ray, one view.    History: Sepsis     Findings: No infiltrate or effusion identified. Cardiomediastinal silhouette is within  normal limits.

## 2018-02-14 LAB
ANION GAP SERPL CALC-SCNC: 11 MMOL/L
BACTERIA UR CULT: NORMAL
BUN SERPL-MCNC: 16 MG/DL
CALCIUM SERPL-MCNC: 9.3 MG/DL
CHLORIDE SERPL-SCNC: 104 MMOL/L
CO2 SERPL-SCNC: 26 MMOL/L
CREAT SERPL-MCNC: 0.8 MG/DL
EST. GFR  (AFRICAN AMERICAN): >60 ML/MIN/1.73 M^2
EST. GFR  (NON AFRICAN AMERICAN): >60 ML/MIN/1.73 M^2
GLUCOSE SERPL-MCNC: 133 MG/DL
POTASSIUM SERPL-SCNC: 4 MMOL/L
SODIUM SERPL-SCNC: 141 MMOL/L

## 2018-02-14 PROCEDURE — 25000003 PHARM REV CODE 250: Performed by: NURSE PRACTITIONER

## 2018-02-14 PROCEDURE — 36415 COLL VENOUS BLD VENIPUNCTURE: CPT

## 2018-02-14 PROCEDURE — 25000242 PHARM REV CODE 250 ALT 637 W/ HCPCS: Performed by: NURSE PRACTITIONER

## 2018-02-14 PROCEDURE — 21400001 HC TELEMETRY ROOM

## 2018-02-14 PROCEDURE — 63600175 PHARM REV CODE 636 W HCPCS: Performed by: NURSE PRACTITIONER

## 2018-02-14 PROCEDURE — 63600175 PHARM REV CODE 636 W HCPCS: Performed by: HOSPITALIST

## 2018-02-14 PROCEDURE — 27000221 HC OXYGEN, UP TO 24 HOURS

## 2018-02-14 PROCEDURE — 80048 BASIC METABOLIC PNL TOTAL CA: CPT

## 2018-02-14 PROCEDURE — 25000003 PHARM REV CODE 250: Performed by: EMERGENCY MEDICINE

## 2018-02-14 PROCEDURE — 94761 N-INVAS EAR/PLS OXIMETRY MLT: CPT

## 2018-02-14 PROCEDURE — 94640 AIRWAY INHALATION TREATMENT: CPT

## 2018-02-14 RX ORDER — FUROSEMIDE 10 MG/ML
40 INJECTION INTRAMUSCULAR; INTRAVENOUS ONCE
Status: COMPLETED | OUTPATIENT
Start: 2018-02-14 | End: 2018-02-14

## 2018-02-14 RX ADMIN — IPRATROPIUM BROMIDE AND ALBUTEROL SULFATE 3 ML: .5; 3 SOLUTION RESPIRATORY (INHALATION) at 03:02

## 2018-02-14 RX ADMIN — ERYTHROMYCIN 1 INCH: 5 OINTMENT OPHTHALMIC at 05:02

## 2018-02-14 RX ADMIN — GUAIFENESIN 600 MG: 600 TABLET, EXTENDED RELEASE ORAL at 09:02

## 2018-02-14 RX ADMIN — DORZOLAMIDE HYDROCHLORIDE AND TIMOLOL MALEATE 1 DROP: 20; 5 SOLUTION/ DROPS OPHTHALMIC at 09:02

## 2018-02-14 RX ADMIN — ENOXAPARIN SODIUM 40 MG: 100 INJECTION SUBCUTANEOUS at 01:02

## 2018-02-14 RX ADMIN — IPRATROPIUM BROMIDE AND ALBUTEROL SULFATE 3 ML: .5; 3 SOLUTION RESPIRATORY (INHALATION) at 07:02

## 2018-02-14 RX ADMIN — ARFORMOTEROL TARTRATE 15 MCG: 15 SOLUTION RESPIRATORY (INHALATION) at 07:02

## 2018-02-14 RX ADMIN — GUAIFENESIN 600 MG: 600 TABLET, EXTENDED RELEASE ORAL at 08:02

## 2018-02-14 RX ADMIN — METHYLPREDNISOLONE SODIUM SUCCINATE 80 MG: 125 INJECTION, POWDER, FOR SOLUTION INTRAMUSCULAR; INTRAVENOUS at 05:02

## 2018-02-14 RX ADMIN — DULOXETINE 30 MG: 30 CAPSULE, DELAYED RELEASE ORAL at 08:02

## 2018-02-14 RX ADMIN — BUDESONIDE 0.5 MG: 0.5 SUSPENSION RESPIRATORY (INHALATION) at 07:02

## 2018-02-14 RX ADMIN — ERYTHROMYCIN 1 INCH: 5 OINTMENT OPHTHALMIC at 09:02

## 2018-02-14 RX ADMIN — FUROSEMIDE 40 MG: 10 INJECTION, SOLUTION INTRAMUSCULAR; INTRAVENOUS at 10:02

## 2018-02-14 RX ADMIN — IPRATROPIUM BROMIDE AND ALBUTEROL SULFATE 3 ML: .5; 3 SOLUTION RESPIRATORY (INHALATION) at 08:02

## 2018-02-14 RX ADMIN — ERYTHROMYCIN 1 INCH: 5 OINTMENT OPHTHALMIC at 01:02

## 2018-02-14 RX ADMIN — PANTOPRAZOLE SODIUM 40 MG: 40 TABLET, DELAYED RELEASE ORAL at 08:02

## 2018-02-14 RX ADMIN — PREDNISOLONE ACETATE 1 DROP: 10 SUSPENSION/ DROPS OPHTHALMIC at 09:02

## 2018-02-14 RX ADMIN — IPRATROPIUM BROMIDE AND ALBUTEROL SULFATE 3 ML: .5; 3 SOLUTION RESPIRATORY (INHALATION) at 11:02

## 2018-02-14 RX ADMIN — AZITHROMYCIN MONOHYDRATE 500 MG: 500 INJECTION, POWDER, LYOPHILIZED, FOR SOLUTION INTRAVENOUS at 03:02

## 2018-02-14 RX ADMIN — CEFTRIAXONE 1 G: 1 INJECTION, SOLUTION INTRAVENOUS at 05:02

## 2018-02-14 RX ADMIN — DORZOLAMIDE HYDROCHLORIDE AND TIMOLOL MALEATE 1 DROP: 20; 5 SOLUTION/ DROPS OPHTHALMIC at 08:02

## 2018-02-14 RX ADMIN — BUDESONIDE 0.5 MG: 0.5 SUSPENSION RESPIRATORY (INHALATION) at 08:02

## 2018-02-14 RX ADMIN — CETIRIZINE HYDROCHLORIDE 10 MG: 10 TABLET, FILM COATED ORAL at 08:02

## 2018-02-14 RX ADMIN — ARFORMOTEROL TARTRATE 15 MCG: 15 SOLUTION RESPIRATORY (INHALATION) at 08:02

## 2018-02-14 RX ADMIN — IPRATROPIUM BROMIDE AND ALBUTEROL SULFATE 3 ML: .5; 3 SOLUTION RESPIRATORY (INHALATION) at 04:02

## 2018-02-14 RX ADMIN — METHYLPREDNISOLONE SODIUM SUCCINATE 80 MG: 125 INJECTION, POWDER, FOR SOLUTION INTRAMUSCULAR; INTRAVENOUS at 09:02

## 2018-02-14 RX ADMIN — ENOXAPARIN SODIUM 40 MG: 100 INJECTION SUBCUTANEOUS at 02:02

## 2018-02-14 RX ADMIN — METHYLPREDNISOLONE SODIUM SUCCINATE 80 MG: 125 INJECTION, POWDER, FOR SOLUTION INTRAMUSCULAR; INTRAVENOUS at 01:02

## 2018-02-14 RX ADMIN — PREDNISOLONE ACETATE 1 DROP: 10 SUSPENSION/ DROPS OPHTHALMIC at 08:02

## 2018-02-14 NOTE — PROGRESS NOTES
[facility] Hospital Medicine  Progress Note    Patient Name: Jennifer Mcclain  MRN: [id number]  Patient Class: IP- Inpatient   Admission Date: 2/12/2018  Length of Stay: 1 days  Attending Physician: Trell Hadley MD  Primary Care Provider: Primary Doctor No    Subjective:     Principal Problem:SIRS (systemic inflammatory response syndrome)    HPI:  Jennifer Mcclain is a 47 y.o. female patient who presented to the Emergency Department for SOB. Onset gradually 4 hours PTA. Symptoms are constant and moderate in severity. Pt was seen in the ED yesterday and was dx with an asthma exacerbation. Pt had a negative flu test and her lactate was normal. Pt returned to the ED today because of worsening sx.  No mitigating or exacerbating factors reported. Associated sxs include cough non productive and worsening over the past week. Fevers, wheezing, congestion, rhinorrhea, and fever. Patient denies any n/v/d, CP, chills, sore throat, HA, lightheadedness, dizziness, and all other sxs at this time. No further complaints or concerns at this time. The patient evaluated in the ER. Symptoms impoved with ED treatment, however patient still wheezing. Labs stable, mild wbc and bacteria in ua, culture pending. Blood cultures pending. Pt started on Rocephin and Azithroymicin in ER. Chest Xray Impression: NAF. Patient placed in obs for monitoring of her asthma exacerbation and will continue to cover with antx, as patient with URI symptoms, fever that continue to worsen and + patient with Ua findings.      Hospital Course:  Patient was not improved after IV steroids, IV Abx, and breathing treatments and was changed to Inpatient status. Still wheezing and she states she is not back to her baseline. Will order another dose IV Lasix today. Continue treatment.    Interval History: Still wheezing and states she is not to her baseline    Review of Systems   Constitutional: Negative for chills, diaphoresis, fatigue and fever.    HENT: Negative for congestion, sore throat and voice change.    Eyes: Positive for discharge ( left). Negative for photophobia and visual disturbance.   Respiratory: Positive for cough, shortness of breath and wheezing. Negative for stridor.    Cardiovascular: Negative for chest pain and leg swelling.   Gastrointestinal: Negative for abdominal distention, abdominal pain, constipation, diarrhea, nausea and vomiting.   Endocrine: Negative for polydipsia, polyphagia and polyuria.   Genitourinary: Negative for difficulty urinating, dysuria, flank pain, pelvic pain, urgency and vaginal discharge.   Musculoskeletal: Negative for back pain, joint swelling, neck pain and neck stiffness.   Skin: Negative for color change and rash.   Allergic/Immunologic: Negative for immunocompromised state.   Neurological: Negative for dizziness, syncope, weakness, numbness and headaches.   Hematological: Does not bruise/bleed easily.   Psychiatric/Behavioral: Negative for agitation, behavioral problems and confusion.     Objective:     Vital Signs (Most Recent):  Temp: 98.7 °F (37.1 °C) (02/14/18 1603)  Pulse: 76 (02/14/18 1603)  Resp: 18 (02/14/18 1603)  BP: 132/71 (02/14/18 1603)  SpO2: 96 % (02/14/18 1603) Vital Signs (24h Range):  Temp:  [97.4 °F (36.3 °C)-98.9 °F (37.2 °C)] 98.7 °F (37.1 °C)  Pulse:  [71-88] 76  Resp:  [14-22] 18  SpO2:  [90 %-98 %] 96 %  BP: (123-141)/(64-79) 132/71     Weight: 122 kg (268 lb 15.4 oz)  Body mass index is 47.64 kg/m².    Intake/Output Summary (Last 24 hours) at 02/14/18 1634  Last data filed at 02/14/18 1328   Gross per 24 hour   Intake              540 ml   Output              600 ml   Net              -60 ml      Physical Exam   Constitutional: She is oriented to person, place, and time. She appears well-developed. No distress.   HENT:   Head: Normocephalic and atraumatic.   Nose: Nose normal.   Eyes: Left eye exhibits discharge ( purulent ). No scleral icterus.   Blind bilaterally  Left eye with  cataract like cloudy covering    Neck: Normal range of motion. Neck supple. No tracheal deviation present.   Cardiovascular: Normal rate, regular rhythm, normal heart sounds and intact distal pulses.    No murmur heard.  trace edema bilateral lower ext  BNP neg     Pulmonary/Chest: Effort normal. No stridor. No respiratory distress. She has wheezes (posterior and anterior). She has no rales.   Abdominal: Soft. Bowel sounds are normal. She exhibits no distension. There is no tenderness. There is no guarding.   Obese     Genitourinary:   Genitourinary Comments: Ua culture pending     Musculoskeletal: Normal range of motion. She exhibits no edema or deformity.   Neurological: She is alert and oriented to person, place, and time. No cranial nerve deficit.   Skin: Skin is warm and dry. Capillary refill takes less than 2 seconds. No rash noted. She is not diaphoretic.   Dry flakey skin bilateral lower ext.    Psychiatric: She has a normal mood and affect. Her behavior is normal. Judgment and thought content normal.   Nursing note and vitals reviewed.    Significant Labs:   CBC:   Recent Labs  Lab 02/12/18 2109 02/13/18  0558   WBC 10.53 10.04   HGB 12.1 11.4*   HCT 36.3* 35.6*    215     CMP:   Recent Labs  Lab 02/12/18 2109 02/14/18  0558    141   K 3.8 4.0    104   CO2 25 26    133*   BUN 14 16   CREATININE 1.0 0.8   CALCIUM 9.7 9.3   PROT 7.7  --    ALBUMIN 4.1  --    BILITOT 0.5  --    ALKPHOS 67  --    AST 43*  --    ALT 30  --    ANIONGAP 12 11   EGFRNONAA >60 >60       Significant Imaging: I have reviewed all pertinent imaging results/findings within the past 24 hours.   Imaging Results          X-Ray Chest AP Portable (Final result)  Result time 02/12/18 21:28:07    Final result by Madhu Johnson MD (02/12/18 21:28:07)                 Impression:         No acute cardiopulmonary process noted. No significant change from prior study 02/11/2018      Electronically signed by: MADHU  CHRISTIAN ESTES  Date:     02/12/18  Time:    21:28              Narrative:    Exam: Chest X-ray, one view.    History: Sepsis     Findings: No infiltrate or effusion identified. Cardiomediastinal silhouette is within normal limits.                                Assessment/Plan:      * SIRS (systemic inflammatory response syndrome)    Improving, monitor  Likley due to asthma exacerbation, however with worsening URI symptoms and Ua findings.   Blood Culture: NGTD  Check sputum culture: unable to collect  Continue rocephin and azithromycin at this time, consider deescalation or further continuation pending course.             Severe asthma with acute exacerbation    Likely exacerbated by acute illness +URI symptoms .   Improving  Continue Nebs, ICS, Steroids  monitor   -IV Lasix daily x 2 doses        Elevated BP without diagnosis of hypertension    Monitor trends          Eye drainage, left    Purulent drainage to left eye on exam  erythromycin ointment course  OP follow up with eye doctor   Monitor         Acute bronchitis    Rocephin and azithromycin  Monitor             Morbid obesity with body mass index (BMI) of 40.0 to 49.9    Encourage weight loss, diet, and exercise             GEN on CPAP    resp consult to continue           VTE Risk Mitigation         Ordered     enoxaparin injection 40 mg  Every 12 hours (non-standard times)     Route:  Subcutaneous        02/13/18 0127     Medium Risk of VTE  Once      02/13/18 0127     Place sequential compression device  Until discontinued      02/13/18 0127     Place JOSÉ MIGUEL hose  Until discontinued      02/13/18 0127     Place sequential compression device  Until discontinued      02/13/18 0109        Sally Flores NP  Department of Hospital Medicine   [facility]

## 2018-02-14 NOTE — PLAN OF CARE
Problem: Fall Risk (Adult)  Intervention: Patient Rounds  Round on patient q2 assess bathroom need bed alarm in use instructed patient to use nico light when need to get up for safety verbalized understanding

## 2018-02-14 NOTE — PLAN OF CARE
Problem: Patient Care Overview  Goal: Plan of Care Review  Outcome: Ongoing (interventions implemented as appropriate)  Plan of care discussed with patient AAOX4 vital signs stable afebrile no resp distress free from falls entire shift bed alarm in use due to patient blindness . cpap on resting quietly steroids and nebs cont blood culture no growth will cont to monitor

## 2018-02-14 NOTE — SUBJECTIVE & OBJECTIVE
Interval History: Still wheezing and states she is not to her baseline    Review of Systems   Constitutional: Negative for chills, diaphoresis, fatigue and fever.   HENT: Negative for congestion, sore throat and voice change.    Eyes: Positive for discharge ( left). Negative for photophobia and visual disturbance.   Respiratory: Positive for cough, shortness of breath and wheezing. Negative for stridor.    Cardiovascular: Negative for chest pain and leg swelling.   Gastrointestinal: Negative for abdominal distention, abdominal pain, constipation, diarrhea, nausea and vomiting.   Endocrine: Negative for polydipsia, polyphagia and polyuria.   Genitourinary: Negative for difficulty urinating, dysuria, flank pain, pelvic pain, urgency and vaginal discharge.   Musculoskeletal: Negative for back pain, joint swelling, neck pain and neck stiffness.   Skin: Negative for color change and rash.   Allergic/Immunologic: Negative for immunocompromised state.   Neurological: Negative for dizziness, syncope, weakness, numbness and headaches.   Hematological: Does not bruise/bleed easily.   Psychiatric/Behavioral: Negative for agitation, behavioral problems and confusion.     Objective:     Vital Signs (Most Recent):  Temp: 98.7 °F (37.1 °C) (02/14/18 1603)  Pulse: 76 (02/14/18 1603)  Resp: 18 (02/14/18 1603)  BP: 132/71 (02/14/18 1603)  SpO2: 96 % (02/14/18 1603) Vital Signs (24h Range):  Temp:  [97.4 °F (36.3 °C)-98.9 °F (37.2 °C)] 98.7 °F (37.1 °C)  Pulse:  [71-88] 76  Resp:  [14-22] 18  SpO2:  [90 %-98 %] 96 %  BP: (123-141)/(64-79) 132/71     Weight: 122 kg (268 lb 15.4 oz)  Body mass index is 47.64 kg/m².    Intake/Output Summary (Last 24 hours) at 02/14/18 1634  Last data filed at 02/14/18 1328   Gross per 24 hour   Intake              540 ml   Output              600 ml   Net              -60 ml      Physical Exam   Constitutional: She is oriented to person, place, and time. She appears well-developed. No distress.   HENT:    Head: Normocephalic and atraumatic.   Nose: Nose normal.   Eyes: Left eye exhibits discharge ( purulent ). No scleral icterus.   Blind bilaterally  Left eye with cataract like cloudy covering    Neck: Normal range of motion. Neck supple. No tracheal deviation present.   Cardiovascular: Normal rate, regular rhythm, normal heart sounds and intact distal pulses.    No murmur heard.  trace edema bilateral lower ext  BNP neg     Pulmonary/Chest: Effort normal. No stridor. No respiratory distress. She has wheezes (posterior and anterior). She has no rales.   Abdominal: Soft. Bowel sounds are normal. She exhibits no distension. There is no tenderness. There is no guarding.   Obese     Genitourinary:   Genitourinary Comments: Ua culture pending     Musculoskeletal: Normal range of motion. She exhibits no edema or deformity.   Neurological: She is alert and oriented to person, place, and time. No cranial nerve deficit.   Skin: Skin is warm and dry. Capillary refill takes less than 2 seconds. No rash noted. She is not diaphoretic.   Dry flakey skin bilateral lower ext.    Psychiatric: She has a normal mood and affect. Her behavior is normal. Judgment and thought content normal.   Nursing note and vitals reviewed.    Significant Labs:   CBC:   Recent Labs  Lab 02/12/18 2109 02/13/18  0558   WBC 10.53 10.04   HGB 12.1 11.4*   HCT 36.3* 35.6*    215     CMP:   Recent Labs  Lab 02/12/18 2109 02/14/18  0558    141   K 3.8 4.0    104   CO2 25 26    133*   BUN 14 16   CREATININE 1.0 0.8   CALCIUM 9.7 9.3   PROT 7.7  --    ALBUMIN 4.1  --    BILITOT 0.5  --    ALKPHOS 67  --    AST 43*  --    ALT 30  --    ANIONGAP 12 11   EGFRNONAA >60 >60       Significant Imaging: I have reviewed all pertinent imaging results/findings within the past 24 hours.   Imaging Results          X-Ray Chest AP Portable (Final result)  Result time 02/12/18 21:28:07    Final result by Madhu Johnson MD (02/12/18 21:28:07)                  Impression:         No acute cardiopulmonary process noted. No significant change from prior study 02/11/2018      Electronically signed by: RODRIGUEZ GONZALES MD  Date:     02/12/18  Time:    21:28              Narrative:    Exam: Chest X-ray, one view.    History: Sepsis     Findings: No infiltrate or effusion identified. Cardiomediastinal silhouette is within normal limits.

## 2018-02-14 NOTE — PLAN OF CARE
CM met with patient at the bedside to assess for discharge needs.  Patient states that she mostly lives in [place] but comes to [place] for her appointments with Dr Mota.  Her son drives her to her medical appointments.  She has a PCP in [place].  She has a nebulizer and CPAP that she uses at home.  She does not have any anticipated needs at this time.  PEARL provided a transitional care folder, information on advanced directives, information on pharmacy bedside delivery, and discharge planning begins on admission with contact information for any needs/questions.     02/14/18 4560   Discharge Assessment   Assessment Type Discharge Planning Assessment   Confirmed/corrected address and phone number on facesheet? Yes   Assessment information obtained from? Patient;Medical Record   Expected Length of Stay (days) 1   Communicated expected length of stay with patient/caregiver yes   Prior to hospitilization cognitive status: Alert/Oriented   Prior to hospitalization functional status: Independent;Assistive Equipment   Current cognitive status: Alert/Oriented   Current Functional Status: Independent;Assistive Equipment   Facility Arrived From: home   Lives With child(joseph), adult   Able to Return to Prior Arrangements yes   Is patient able to care for self after discharge? Yes   Who are your caregiver(s) and their phone number(s)? Aron Paige, son [phone]   Patient's perception of discharge disposition home or selfcare   Readmission Within The Last 30 Days no previous admission in last 30 days   Patient currently being followed by outpatient case management? No   Patient currently receives any other outside agency services? No   Equipment Currently Used at Home nebulizer;CPAP  (walking cane (patient is blind))   Do you have any problems affording any of your prescribed medications? No   Is the patient taking medications as prescribed? yes   Does the patient have transportation home? Yes   Transportation  Available family or friend will provide   Dialysis Name and Scheduled days NA   Does the patient receive services at the [facility]? No   Discharge Plan A Home with family   Discharge Plan B Home with family   Patient/Family In Agreement With Plan yes

## 2018-02-14 NOTE — PLAN OF CARE
Problem: Patient Care Overview  Goal: Plan of Care Review  Outcome: Ongoing (interventions implemented as appropriate)  Pt remains free of falls and free of injury this shift. Pt able to ambulate in room with stand by assistance. Bed in low position, side rails up x2, call light in reach.

## 2018-02-14 NOTE — ASSESSMENT & PLAN NOTE
Improving, monitor  [place] due to asthma exacerbation, however with worsening URI symptoms and Ua findings.   Blood Culture: NGTD  Check sputum culture: unable to collect  Continue rocephin and azithromycin at this time, consider deescalation or further continuation pending course.

## 2018-02-14 NOTE — ASSESSMENT & PLAN NOTE
Likely exacerbated by acute illness +URI symptoms .   Improving  Continue Nebs, ICS, Steroids  monitor   -IV Lasix daily x 2 doses

## 2018-02-15 VITALS
WEIGHT: 262.38 LBS | SYSTOLIC BLOOD PRESSURE: 134 MMHG | TEMPERATURE: 96 F | OXYGEN SATURATION: 95 % | HEART RATE: 74 BPM | BODY MASS INDEX: 46.49 KG/M2 | DIASTOLIC BLOOD PRESSURE: 60 MMHG | HEIGHT: 63 IN | RESPIRATION RATE: 18 BRPM

## 2018-02-15 PROBLEM — J96.01 ACUTE RESPIRATORY FAILURE WITH HYPOXIA AND HYPERCARBIA: Status: ACTIVE | Noted: 2018-02-15

## 2018-02-15 PROBLEM — J96.02 ACUTE RESPIRATORY FAILURE WITH HYPOXIA AND HYPERCARBIA: Status: ACTIVE | Noted: 2018-02-15

## 2018-02-15 LAB
ANION GAP SERPL CALC-SCNC: 12 MMOL/L
BUN SERPL-MCNC: 18 MG/DL
CALCIUM SERPL-MCNC: 9.4 MG/DL
CHLORIDE SERPL-SCNC: 102 MMOL/L
CO2 SERPL-SCNC: 25 MMOL/L
CREAT SERPL-MCNC: 0.8 MG/DL
EST. GFR  (AFRICAN AMERICAN): >60 ML/MIN/1.73 M^2
EST. GFR  (NON AFRICAN AMERICAN): >60 ML/MIN/1.73 M^2
GLUCOSE SERPL-MCNC: 116 MG/DL
POTASSIUM SERPL-SCNC: 3.9 MMOL/L
SODIUM SERPL-SCNC: 139 MMOL/L

## 2018-02-15 PROCEDURE — 63600175 PHARM REV CODE 636 W HCPCS: Performed by: NURSE PRACTITIONER

## 2018-02-15 PROCEDURE — 94761 N-INVAS EAR/PLS OXIMETRY MLT: CPT

## 2018-02-15 PROCEDURE — 90471 IMMUNIZATION ADMIN: CPT | Performed by: HOSPITALIST

## 2018-02-15 PROCEDURE — 27000221 HC OXYGEN, UP TO 24 HOURS

## 2018-02-15 PROCEDURE — 36415 COLL VENOUS BLD VENIPUNCTURE: CPT

## 2018-02-15 PROCEDURE — 63600175 PHARM REV CODE 636 W HCPCS: Performed by: HOSPITALIST

## 2018-02-15 PROCEDURE — 80048 BASIC METABOLIC PNL TOTAL CA: CPT

## 2018-02-15 PROCEDURE — G0009 ADMIN PNEUMOCOCCAL VACCINE: HCPCS | Performed by: HOSPITALIST

## 2018-02-15 PROCEDURE — 96372 THER/PROPH/DIAG INJ SC/IM: CPT

## 2018-02-15 PROCEDURE — 90670 PCV13 VACCINE IM: CPT | Performed by: HOSPITALIST

## 2018-02-15 PROCEDURE — 94640 AIRWAY INHALATION TREATMENT: CPT

## 2018-02-15 PROCEDURE — 25000242 PHARM REV CODE 250 ALT 637 W/ HCPCS: Performed by: NURSE PRACTITIONER

## 2018-02-15 PROCEDURE — 3E0234Z INTRODUCTION OF SERUM, TOXOID AND VACCINE INTO MUSCLE, PERCUTANEOUS APPROACH: ICD-10-PCS | Performed by: INTERNAL MEDICINE

## 2018-02-15 PROCEDURE — 25000003 PHARM REV CODE 250: Performed by: NURSE PRACTITIONER

## 2018-02-15 PROCEDURE — 25000003 PHARM REV CODE 250: Performed by: EMERGENCY MEDICINE

## 2018-02-15 PROCEDURE — 94660 CPAP INITIATION&MGMT: CPT

## 2018-02-15 RX ORDER — LEVOFLOXACIN 750 MG/1
750 TABLET ORAL DAILY
Qty: 5 TABLET | Refills: 0 | Status: SHIPPED | OUTPATIENT
Start: 2018-02-15 | End: 2018-02-20

## 2018-02-15 RX ORDER — IPRATROPIUM BROMIDE AND ALBUTEROL SULFATE 2.5; .5 MG/3ML; MG/3ML
3 SOLUTION RESPIRATORY (INHALATION) EVERY 8 HOURS PRN
Qty: 360 ML | Refills: 1 | Status: SHIPPED | OUTPATIENT
Start: 2018-02-15 | End: 2019-10-26 | Stop reason: SDUPTHER

## 2018-02-15 RX ORDER — ERYTHROMYCIN 5 MG/G
OINTMENT OPHTHALMIC EVERY 8 HOURS
Qty: 3.5 G | Refills: 0 | Status: SHIPPED | OUTPATIENT
Start: 2018-02-15 | End: 2018-07-27

## 2018-02-15 RX ORDER — CETIRIZINE HYDROCHLORIDE 10 MG/1
10 TABLET ORAL DAILY
Qty: 30 TABLET | Refills: 1 | COMMUNITY
Start: 2018-02-15 | End: 2018-02-15 | Stop reason: HOSPADM

## 2018-02-15 RX ORDER — PREDNISONE 10 MG/1
TABLET ORAL
Qty: 32 TABLET | Refills: 0 | Status: SHIPPED | OUTPATIENT
Start: 2018-02-16 | End: 2018-02-21 | Stop reason: SDUPTHER

## 2018-02-15 RX ADMIN — IPRATROPIUM BROMIDE AND ALBUTEROL SULFATE 3 ML: .5; 3 SOLUTION RESPIRATORY (INHALATION) at 07:02

## 2018-02-15 RX ADMIN — DORZOLAMIDE HYDROCHLORIDE AND TIMOLOL MALEATE 1 DROP: 20; 5 SOLUTION/ DROPS OPHTHALMIC at 09:02

## 2018-02-15 RX ADMIN — BUDESONIDE 0.5 MG: 0.5 SUSPENSION RESPIRATORY (INHALATION) at 07:02

## 2018-02-15 RX ADMIN — IPRATROPIUM BROMIDE AND ALBUTEROL SULFATE 3 ML: .5; 3 SOLUTION RESPIRATORY (INHALATION) at 12:02

## 2018-02-15 RX ADMIN — GUAIFENESIN 600 MG: 600 TABLET, EXTENDED RELEASE ORAL at 09:02

## 2018-02-15 RX ADMIN — METHYLPREDNISOLONE SODIUM SUCCINATE 80 MG: 125 INJECTION, POWDER, FOR SOLUTION INTRAMUSCULAR; INTRAVENOUS at 05:02

## 2018-02-15 RX ADMIN — IPRATROPIUM BROMIDE AND ALBUTEROL SULFATE 3 ML: .5; 3 SOLUTION RESPIRATORY (INHALATION) at 11:02

## 2018-02-15 RX ADMIN — PNEUMOCOCCAL 13-VALENT CONJUGATE VACCINE 0.5 ML: 2.2; 2.2; 2.2; 2.2; 2.2; 4.4; 2.2; 2.2; 2.2; 2.2; 2.2; 2.2; 2.2 INJECTION, SUSPENSION INTRAMUSCULAR at 01:02

## 2018-02-15 RX ADMIN — ARFORMOTEROL TARTRATE 15 MCG: 15 SOLUTION RESPIRATORY (INHALATION) at 07:02

## 2018-02-15 RX ADMIN — METHYLPREDNISOLONE SODIUM SUCCINATE 80 MG: 125 INJECTION, POWDER, FOR SOLUTION INTRAMUSCULAR; INTRAVENOUS at 01:02

## 2018-02-15 RX ADMIN — CETIRIZINE HYDROCHLORIDE 10 MG: 10 TABLET, FILM COATED ORAL at 09:02

## 2018-02-15 RX ADMIN — ERYTHROMYCIN 1 INCH: 5 OINTMENT OPHTHALMIC at 05:02

## 2018-02-15 RX ADMIN — IPRATROPIUM BROMIDE AND ALBUTEROL SULFATE 3 ML: .5; 3 SOLUTION RESPIRATORY (INHALATION) at 03:02

## 2018-02-15 RX ADMIN — ENOXAPARIN SODIUM 40 MG: 100 INJECTION SUBCUTANEOUS at 05:02

## 2018-02-15 RX ADMIN — IPRATROPIUM BROMIDE AND ALBUTEROL SULFATE 3 ML: .5; 3 SOLUTION RESPIRATORY (INHALATION) at 04:02

## 2018-02-15 RX ADMIN — DULOXETINE 30 MG: 30 CAPSULE, DELAYED RELEASE ORAL at 09:02

## 2018-02-15 RX ADMIN — PANTOPRAZOLE SODIUM 40 MG: 40 TABLET, DELAYED RELEASE ORAL at 09:02

## 2018-02-15 RX ADMIN — PREDNISOLONE ACETATE 1 DROP: 10 SUSPENSION/ DROPS OPHTHALMIC at 09:02

## 2018-02-15 NOTE — DISCHARGE INSTRUCTIONS
Levofloxacin tablets  What is this medicine?  LEVOFLOXACIN (emil henriquez MICHELA sofy sin) is a quinolone antibiotic. It is used to treat certain kinds of bacterial infections. It will not work for colds, flu, or other viral infections.  How should I use this medicine?  Take this medicine by mouth with a full glass of water. Follow the directions on the prescription label. This medicine can be taken with or without food. Take your medicine at regular intervals. Do not take your medicine more often than directed. Do not skip doses or stop your medicine early even if you feel better. Do not stop taking except on your doctor's advice.  A special MedGuide will be given to you by the pharmacist with each prescription and refill. Be sure to read this information carefully each time.  Talk to your pediatrician regarding the use of this medicine in children. While this drug may be prescribed for children as young as 6 months for selected conditions, precautions do apply.  What side effects may I notice from receiving this medicine?  Side effects that you should report to your doctor or health care professional as soon as possible:  · allergic reactions like skin rash or hives, swelling of the face, lips, or tongue  · anxious  · confusion  · depressed mood  · diarrhea  · fast, irregular heartbeat  · hallucination, loss of contact with reality  · joint, muscle, or tendon pain or swelling  · pain, tingling, numbness in the hands or feet  · suicidal thoughts or other mood changes  · sunburn  · unusually weak or tired  Side effects that usually do not require medical attention (report to your doctor or health care professional if they continue or are bothersome):  · dry mouth  · headache  · nausea  · trouble sleeping  What may interact with this medicine?  Do not take this medicine with any of the following medications:  · arsenic trioxide  · chloroquine  · droperidol  · medicines for irregular heart rhythm like amiodarone, disopyramide,  dofetilide, flecainide, quinidine, procainamide, sotalol  · some medicines for depression or mental problems like phenothiazines, pimozide, and ziprasidone  This medicine may also interact with the following medications:  · amoxapine  · antacids  · birth control pills  · cisapride  · dairy products  · didanosine (ddI) buffered tablets or powder  · haloperidol  · multivitamins  · NSAIDS, medicines for pain and inflammation, like ibuprofen or naproxen  · retinoid products like tretinoin or isotretinoin  · risperidone  · some other antibiotics like clarithromycin or erythromycin  · sucralfate  · theophylline  · warfarin  What if I miss a dose?  If you miss a dose, take it as soon as you remember. If it is almost time for your next dose, take only that dose. Do not take double or extra doses.  Where should I keep my medicine?  Keep out of the reach of children.  Store at room temperature between 15 and 30 degrees C (59 and 86 degrees F). Keep in a tightly closed container. Throw away any unused medicine after the expiration date.  What should I tell my health care provider before I take this medicine?  They need to know if you have any of these conditions:  · bone problems  · cerebral disease  · history of low levels of potassium in the blood  · irregular heartbeat  · joint problems  · kidney disease  · myasthenia gravis  · seizures  · tendon problems  · tingling of the fingers or toes, or other nerve disorder  · an unusual or allergic reaction to levofloxacin, other quinolone antibiotics, foods, dyes, or preservatives  · pregnant or trying to get pregnant  · breast-feeding  What should I watch for while using this medicine?  Tell your doctor or health care professional if your symptoms do not improve or if they get worse. Drink several glasses of water a day and cut down on drinks that contain caffeine. You must not get dehydrated while taking this medicine.  You may get drowsy or dizzy. Do not drive, use machinery, or  do anything that needs mental alertness until you know how this medicine affects you. Do not sit or stand up quickly, especially if you are an older patient. This reduces the risk of dizzy or fainting spells.  This medicine can make you more sensitive to the sun. Keep out of the sun. If you cannot avoid being in the sun, wear protective clothing and use a sunscreen. Do not use sun lamps or tanning beds/booths. Contact your doctor if you get a sunburn.  If you are a diabetic monitor your blood glucose carefully. If you get an unusual reading stop taking this medicine and call your doctor right away.  Do not treat diarrhea with over-the-counter products. Contact your doctor if you have diarrhea that lasts more than 2 days or if the diarrhea is severe and watery.  Avoid antacids, calcium, iron, and zinc products for 2 hours before and 2 hours after taking a dose of this medicine.  NOTE:This sheet is a summary. It may not cover all possible information. If you have questions about this medicine, talk to your doctor, pharmacist, or health care provider. Copyright© 2017 [employer]        Erythromycin tablets or capsules, delayed release  What is this medicine?  ERYTHROMYCIN (er ith philfadi ROSALESFADI sin) is a macrolide antibiotic. It is used to treat certain kinds of bacterial infections. It will not work for colds, flu, or other viral infections.  How should I use this medicine?  Take this medicine by mouth with glass of water. Follow the directions on the prescription label. Do not chew or crush. You can take this medicine on an empty stomach or with food or milk. Take your medicine at regular intervals. Do not take your medicine more often than directed. Finish the full course prescribed by your doctor even if you think your condition is better. Do not stop taking except on your doctor's advice.  Talk to your pediatrician regarding the use of this medicine in children. Special care may be needed.  What side effects may I  notice from receiving this medicine?  Side effects that you should report to your doctor or health care professional as soon as possible:  · allergic reactions like skin rash, itching or hives, swelling of the face, lips, or tongue  · dark urine  · difficulty breathing  · hearing loss  · irregular heartbeat or chest pain  · redness, blistering, peeling or loosening of the skin, including inside the mouth  · severe or watery diarrhea  · unusually weak or tired  · yellowing of eyes or skin  Side effects that usually do not require medical attention (report to your doctor or health care professional if they continue or are bothersome):  · diarrhea  · loss of appetite  · nausea, vomiting  · stomach pain  What may interact with this medicine?  Do not take this medicine with any of the following medications:  · astemizole  · certain medicines for cholesterol like atorvastatin, cerivastatin, lovastatin, simvastatin  · certain medicines for fungal infections like fluconazole, itraconazole, ketoconazole, posaconazole, voriconazole  · certain medicines for irregular heart beat like amiodarone, disopyramide, dofetilide, dronedarone, flecainide, procainamide, propafenone, quinidine  · certain medicines for psychotic disturbances like mesoridazine, pimozide, thioridazine, ziprasidone  · chloroquine  · cisapride  · droperidol  · eplerenone  · ergot alkaloids like ergotamine, dihydroergotamine  · methadone  · other antibiotics like grepafloxacin or sparfloxacin  · sirolimus  · terfenadine  · vinblastine  · red yeast rice  This medicine may also interact with the following medications:  · alfentanil  · birth control  · bromocriptine  · carbamazepine  · certain medicines for anxiety or sleep  · certain medicines that treat or prevent blood clots like warfarin  · cyclosporine  · digoxin  · other medicines that prolong the QT interval (cause an abnormal heart rhythm)  · phenytoin  · theophylline  · valproate  What if I miss a  dose?  If you miss a dose, take it as soon as you can. If it is almost time for your next dose, take only that dose. Do not take double or extra doses.  Where should I keep my medicine?  Keep out of the reach of children.  Store at room temperature between 15 and 30 degrees C (59 and 86 degrees F). Keep container tightly closed. Throw away any unused medicine after the expiration date.  What should I tell my health care provider before I take this medicine?  They need to know if you have any of these conditions:  · liver disease  · myasthenia gravis  · an unusual or allergic reaction to erythromycin, other medicines, foods, dyes, or preservatives  · pregnant or trying to get pregnant  · breast-feeding  What should I watch for while using this medicine?  Tell your doctor or health care professional if your symptoms do not improve or if they get worse.  Do not treat diarrhea with over the counter products. Contact your doctor if you have diarrhea that lasts more than 2 days or if it is severe and watery.  NOTE:This sheet is a summary. It may not cover all possible information. If you have questions about this medicine, talk to your doctor, pharmacist, or health care provider. Copyright© 2017 [employer]

## 2018-02-15 NOTE — PLAN OF CARE
Problem: Patient Care Overview  Goal: Plan of Care Review  Outcome: Ongoing (interventions implemented as appropriate)  POC discussed w/patient, verbalized understanding. NSR on monitor. VSS. Patient up to bathroom with assistance. Patient turns independently in bed. Fall precautions in place, non-skid socks on when out of bed, bed in low position and locked, call bed in reach. Patient instructed to call for assistance when needs to get up.  Patient is in no distress at this time. Patient denies needs at this time. Will continue to monitor.

## 2018-02-15 NOTE — PLAN OF CARE
Feb21 Established Patient Visit with Mike Mota MD   Wednesday Feb 21, 2018 10:00 AM  [facility]   [id number] [facility] [id number] [phone]      Patient discharging home with oxygen through [employer]     02/15/18 1410   Final Note   Assessment Type Final Discharge Note   Discharge Disposition Home   Hospital Follow Up  Appt(s) scheduled? Yes   Right Care Referral Info   Post Acute Recommendation No Care

## 2018-02-15 NOTE — PROGRESS NOTES
Home Oxygen Evaluation    Date Performed: 2/15/2018    1) Patient's Home O2 Sat on room air, while at rest: 90        If O2 sats on room air at rest are 88% or below, patient qualifies. No additional testing needed. Document N/A in steps 2 and 3. If 89% or above, complete steps 2.      2) Patient's O2 Sat on room air while exercisin        If O2 sats on room air while exercising remain 89% or above patient does not qualify, no further testing needed Document N/A in step 3. If O2 sats on room air while exercising are 88% or below, continue to step 3.      3) Patient's O2 Sat while exercising on O2: 93 at 2 LPM         (Must show improvement from #2 for patients to qualify)    If O2 sats improve on oxygen, patient qualifies for portable oxygen. If not, the patient does not qualify.

## 2018-02-15 NOTE — PLAN OF CARE
Problem: Patient Care Overview  Goal: Plan of Care Review  Outcome: Ongoing (interventions implemented as appropriate)  Pt tolerates txs and oxygen therapy well; wearing cpap at night.

## 2018-02-15 NOTE — DISCHARGE SUMMARY
[facility] Hospital Medicine  Discharge Summary      Patient Name: Jennifer Mcclain  MRN: [id number]  Admission Date: 2/12/2018  Hospital Length of Stay: 2 days  Discharge Date and Time:  02/15/2018 1:05 PM  Attending Physician: Trell Hadley MD   Discharging Provider: Sally Flores NP  Primary Care Provider: Primary Doctor No    HPI:   Jennifer Mcclain is a 47 y.o. female patient who presented to the Emergency Department for SOB. Onset gradually 4 hours PTA. Symptoms are constant and moderate in severity. Pt was seen in the ED yesterday and was dx with an asthma exacerbation. Pt had a negative flu test and her lactate was normal. Pt returned to the ED today because of worsening sx.  No mitigating or exacerbating factors reported. Associated sxs include cough non productive and worsening over the past week. Fevers, wheezing, congestion, rhinorrhea, and fever. Patient denies any n/v/d, CP, chills, sore throat, HA, lightheadedness, dizziness, and all other sxs at this time. No further complaints or concerns at this time. The patient evaluated in the ER. Symptoms impoved with ED treatment, however patient still wheezing. Labs stable, mild wbc and bacteria in ua, culture pending. Blood cultures pending. Pt started on Rocephin and Azithroymicin in ER. Chest Xray Impression: NAF. Patient placed in obs for monitoring of her asthma exacerbation and will continue to cover with antx, as patient with URI symptoms, fever that continue to worsen and + patient with Ua findings.      * No surgery found *      Hospital Course:   Patient was not improved after IV steroids, IV Abx, and breathing treatments and was changed to Inpatient status. She had severe wheezing during admission. Only after several days of treatment was she back to her baseline. She was given 2 days of IV Lasix and breathing improved. Rx for Levaquin and steroid taper. She qualified for home O2 with activity sat of 86%. No wheezing the day of  d/c. She has a follow-up appointment with Nakul 2/21/18. She lives in [place], but comes to  for pulmonary appointments. Patient seen and examined and deemed stable for d/c.      Consults:   Consults         Status Ordering Provider     Inpatient consult to Respiratory Care  Once     Provider:  (Not yet assigned)    Acknowledged JORGE LUIS HERNANDEZ     Inpatient consult to Respiratory Care  Once     Provider:  (Not yet assigned)    Acknowledged JORGE LUIS HERNANDEZ          No new Assessment & Plan notes have been filed under this hospital service since the last note was generated.  Service: Hospital Medicine    Final Active Diagnoses:    Diagnosis Date Noted POA    PRINCIPAL PROBLEM:  Acute respiratory failure with hypoxia and hypercarbia [J96.01, J96.02] 02/15/2018 Yes    SIRS (systemic inflammatory response syndrome) [R65.10] 02/13/2018 Yes    Severe asthma with acute exacerbation [J45.901] 02/13/2018 Yes    Acute bronchitis [J20.9] 02/13/2018 Yes    Eye drainage, left [H57.8] 02/13/2018 Yes    Elevated BP without diagnosis of hypertension [R03.0] 02/13/2018 Yes    Morbid obesity with body mass index (BMI) of 40.0 to 49.9 [E66.01] 10/09/2017 Yes    GEN on CPAP [G47.33, Z99.89] 04/05/2017 Not Applicable    COPD exacerbation [J44.1] 03/26/2017 Yes      Problems Resolved During this Admission:    Diagnosis Date Noted Date Resolved POA     Discharged Condition: stable    Disposition: Home or Self Care    Follow Up:  Follow-up Information     Primary Doctor No. Schedule an appointment as soon as possible for a visit in 3 days.    Why:  lives in [place]           Mike Mota MD In 1 week.    Specialty:  Pulmonary Disease  Contact information:  [address]  [place] [id number] [phone]             [facility].    Specialty:  DME Provider  Why:  home oxygen  Contact information:  [address] [place] [address] [phone]                 Patient Instructions:     OXYGEN FOR  "HOME USE   Order Specific Question Answer Comments   Liter Flow 2    Duration With activity    Qualifying SpO2: 86    Testing done at: Exercise/Activity    Portable mode: pulse dose acceptable    Device home concentrator with portable unit    Length of need (in months): 99 mos    Patient condition with qualifying saturation COPD chronic hypoxia, asthma   Height: 5' 3" (1.6 m)    Weight: 119 kg (262 lb 5.6 oz)    Does patient have medical equipment at home? nebulizer walking cane (patient is blind) / walking cane (patient is blind)   Does patient have medical equipment at home? CPAP    Alternative treatment measures have been tried or considered and deemed clinically ineffective. Yes      Activity as tolerated         Significant Diagnostic Studies: Labs:   CMP     Recent Labs  Lab 02/14/18  0558 02/15/18  0531    139   K 4.0 3.9    102   CO2 26 25   * 116*   BUN 16 18   CREATININE 0.8 0.8   CALCIUM 9.3 9.4   ANIONGAP 11 12   ESTGFRAFRICA >60 >60   EGFRNONAA >60 >60   , CBC No results for input(s): WBC, HGB, HCT, PLT in the last 48 hours., INR   Lab Results   Component Value Date    INR 1.0 02/11/2018   , Lipid Panel   Lab Results   Component Value Date    CHOL 209 (H) 04/18/2017    HDL 59 04/18/2017    LDLCALC 135.8 04/18/2017    TRIG 71 04/18/2017    CHOLHDL 28.2 04/18/2017    and Troponin     Recent Labs  Lab 02/11/18  0944   TROPONINI 0.009     Imaging Results          X-Ray Chest AP Portable (Final result)  Result time 02/12/18 21:28:07    Final result by Madhu Johnson MD (02/12/18 21:28:07)                 Impression:         No acute cardiopulmonary process noted. No significant change from prior study 02/11/2018      Electronically signed by: MADHU JOHNSON MD  Date:     02/12/18  Time:    21:28              Narrative:    Exam: Chest X-ray, one view.    History: Sepsis     Findings: No infiltrate or effusion identified. Cardiomediastinal silhouette is within normal limits.               "              Pending Diagnostic Studies:     None         Medications:  Reconciled Home Medications:   Current Discharge Medication List      START taking these medications    Details   erythromycin (ROMYCIN) ophthalmic ointment Place into the left eye every 8 (eight) hours.  Qty: 3.5 g, Refills: 0      levoFLOXacin (LEVAQUIN) 750 MG tablet Take 1 tablet (750 mg total) by mouth once daily.  Qty: 5 tablet, Refills: 0         CONTINUE these medications which have CHANGED    Details   albuterol-ipratropium 2.5mg-0.5mg/3mL (DUO-NEB) 0.5 mg-3 mg(2.5 mg base)/3 mL nebulizer solution Take 3 mLs by nebulization every 8 (eight) hours as needed for Wheezing. Rescue  Qty: 360 mL, Refills: 1    Associated Diagnoses: Moderate persistent asthma without complication      predniSONE (DELTASONE) 10 MG tablet 4 tabs daily x 3 days, 3 tabs daily x 3 days, 2 tabs daily x 3 days, 1 tabs daily x 3 days, 1/2 tab daily x 3 days  Qty: 32 tablet, Refills: 0         CONTINUE these medications which have NOT CHANGED    Details   albuterol (VENTOLIN HFA) 90 mcg/actuation inhaler Inhale 2 puffs into the lungs every 4 (four) hours as needed for Wheezing.  Qty: 18 g, Refills: 11    Associated Diagnoses: Moderate persistent asthma without complication      cetirizine (ZYRTEC) 10 MG tablet Take 1 tablet (10 mg total) by mouth once daily.  Qty: 30 tablet, Refills: 1      dorzolamide-timolol 2-0.5% (COSOPT) 22.3-6.8 mg/mL ophthalmic solution Place 1 drop into both eyes every 12 (twelve) hours.  Qty: 1 Bottle, Refills: 6    Associated Diagnoses: Uveitic glaucoma of both eyes, severe stage      duloxetine (CYMBALTA) 20 MG capsule Take 30 mg by mouth once daily.      fluticasone-vilanterol (BREO ELLIPTA) 200-25 mcg/dose DsDv diskus inhaler Inhale 1 puff into the lungs once daily. Controller  Qty: 1 each, Refills: 11    Associated Diagnoses: Moderate persistent asthma without complication      ondansetron (ZOFRAN) 4 MG tablet Take 1 tablet (4 mg total) by  mouth every 8 (eight) hours as needed for Nausea.  Qty: 12 tablet, Refills: 0         STOP taking these medications       prednisoLONE acetate (PRED FORTE) 1 % DrpS Comments:   Reason for Stopping:             Indwelling Lines/Drains at time of discharge:   Lines/Drains/Airways          No matching active lines, drains, or airways          Time spent on the discharge of patient: 45 minutes  Patient was seen and examined on the date of discharge and determined to be suitable for discharge.    Sally Flores NP  Department of Hospital Medicine  [facility]

## 2018-02-15 NOTE — PLAN OF CARE
Orders received for home oxygen.  PEARL contacted Maryellen with [facility].  Maryellen will come to hospital to deliver portable oxygen

## 2018-02-18 LAB
BACTERIA BLD CULT: NORMAL
BACTERIA BLD CULT: NORMAL

## 2018-02-21 ENCOUNTER — OFFICE VISIT (OUTPATIENT)
Dept: PULMONOLOGY | Facility: CLINIC | Age: 48
End: 2018-02-21
Payer: MEDICARE

## 2018-02-21 VITALS
RESPIRATION RATE: 18 BRPM | SYSTOLIC BLOOD PRESSURE: 115 MMHG | DIASTOLIC BLOOD PRESSURE: 76 MMHG | WEIGHT: 271.38 LBS | BODY MASS INDEX: 48.07 KG/M2 | OXYGEN SATURATION: 97 % | HEART RATE: 92 BPM

## 2018-02-21 DIAGNOSIS — D86.9 SARCOIDOSIS: ICD-10-CM

## 2018-02-21 DIAGNOSIS — E66.01 MORBID OBESITY WITH BODY MASS INDEX (BMI) OF 40.0 TO 49.9: ICD-10-CM

## 2018-02-21 DIAGNOSIS — H54.8 LEGAL BLINDNESS: ICD-10-CM

## 2018-02-21 DIAGNOSIS — I51.89 DIASTOLIC DYSFUNCTION: Chronic | ICD-10-CM

## 2018-02-21 DIAGNOSIS — J45.40 MODERATE PERSISTENT ASTHMA WITHOUT COMPLICATION: Chronic | ICD-10-CM

## 2018-02-21 DIAGNOSIS — G47.33 OSA ON CPAP: Primary | ICD-10-CM

## 2018-02-21 PROBLEM — J44.1 COPD EXACERBATION: Status: RESOLVED | Noted: 2017-03-26 | Resolved: 2018-02-21

## 2018-02-21 PROBLEM — J96.02 ACUTE RESPIRATORY FAILURE WITH HYPOXIA AND HYPERCARBIA: Status: RESOLVED | Noted: 2018-02-15 | Resolved: 2018-02-21

## 2018-02-21 PROBLEM — J20.9 ACUTE BRONCHITIS: Status: RESOLVED | Noted: 2018-02-13 | Resolved: 2018-02-21

## 2018-02-21 PROBLEM — J96.01 ACUTE RESPIRATORY FAILURE WITH HYPOXIA AND HYPERCARBIA: Status: RESOLVED | Noted: 2018-02-15 | Resolved: 2018-02-21

## 2018-02-21 PROCEDURE — 99213 OFFICE O/P EST LOW 20 MIN: CPT | Mod: PBBFAC | Performed by: INTERNAL MEDICINE

## 2018-02-21 PROCEDURE — 99999 PR PBB SHADOW E&M-EST. PATIENT-LVL III: CPT | Mod: PBBFAC,,, | Performed by: INTERNAL MEDICINE

## 2018-02-21 PROCEDURE — 99214 OFFICE O/P EST MOD 30 MIN: CPT | Mod: S$PBB,,, | Performed by: INTERNAL MEDICINE

## 2018-02-21 RX ORDER — PREDNISONE 10 MG/1
TABLET ORAL
Qty: 32 TABLET | Refills: 0 | Status: SHIPPED | OUTPATIENT
Start: 2018-02-21 | End: 2018-05-28

## 2018-02-21 NOTE — PROGRESS NOTES
Subjective:       Patient ID: Jennifer Mcclain is a 47 y.o. female.    Chief Complaint: COPD and Asthma    Ms. Jennifer Mcclain is 47 years old  She has advanced oculus sarcoid with blindness.  Followed up for uveitis by Ophthalmology  Was at [facility] and discharged for Respiratory exacerbation: Levaquin and prednisone taper  Sarcoidosis is stable from a pulmonary perspective  Her PFTs previously  are consistent with a moderate obstructive defect.  FEV1 is 61% predicted  Her inhalers   Breo Ellipta    Immunizations are up-to-date  Chest x-ray was clear  She has had CPAP machine works well  Adherence was stressed she still leaving intermittently here in [place] and in [place]  Patient has problems with adjusting to her total vision impairment.  I have reviewed the patient's medical history in detail and updated the computerized patient record.        Review of Systems   Constitutional: Negative.    HENT: Positive for postnasal drip.    Eyes: Negative.    Respiratory: Negative.  Positive for dyspnea on extertion. Negative for snoring, sputum production, shortness of breath and wheezing.    Cardiovascular: Negative.    Genitourinary: Negative.    Endocrine: endocrine negative   Musculoskeletal: Negative.    Skin: Negative.    Gastrointestinal: Negative.    Neurological: Negative.    Psychiatric/Behavioral: Negative.        Objective:       Vitals:    02/21/18 1100   BP: 115/76   BP Location: Left arm   Patient Position: Sitting   Pulse: 92   Resp: 18   SpO2: 97%   Weight: 123.1 kg (271 lb 6.2 oz)     Physical Exam   Constitutional: She is oriented to person, place, and time. She appears well-developed and well-nourished. She is obese.   HENT:   Head: Normocephalic.   Nose: Nose normal.   Neck: Normal range of motion. Neck supple.   Cardiovascular: Normal rate, regular rhythm and normal heart sounds.    Pulmonary/Chest: Normal expansion, symmetric chest wall expansion and effort normal.   Abdominal: Soft.   Musculoskeletal:  Normal range of motion.   Lymphadenopathy:     She has no cervical adenopathy.   Neurological: She is alert and oriented to person, place, and time. She has normal reflexes.   Skin: Skin is warm and dry. Nails show no clubbing.   Nursing note and vitals reviewed.    Personal Diagnostic Review  Chest x-ray:   Findings: The lungs are clear and free of infiltrate.  No pleural effusion or pneumothorax is identified.  The heart is enlarged.    Pulmonary function tests: FEV1: 1.37  (61 % predicted), FVC:  1.85 (66 % predicted), FEV1/FVC:  74    Arterial Blood Gas result:  pO2 65; pCO2 41.4; pH 7.39;  HCO3 25.5, %O2 Sat 92.    No flowsheet data found.      Assessment:       Problem List Items Addressed This Visit     Moderate persistent asthma without complication (Chronic)    Relevant Medications    predniSONE (DELTASONE) 10 MG tablet    Other Relevant Orders    Complete PFT without bronchodilator - Clinic    Stress test, pulmonary    PULM - Arterial Blood Gases--in addition to PFT only    Diastolic dysfunction (Chronic)    Sarcoidosis    Relevant Medications    predniSONE (DELTASONE) 10 MG tablet    Other Relevant Orders    Complete PFT without bronchodilator - Clinic    Stress test, pulmonary    PULM - Arterial Blood Gases--in addition to PFT only    X-Ray Chest PA And Lateral    GEN on CPAP - Primary    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    This note was prepared using voice recognition system and is likely to have sound alike errors that may have been overlooked even after proof reading.  Please call me with any questions    Discussed diagnosis, its evaluation, treatment and usual course. All questions answered.    Thank you for the courtesy of  participating in the care of this patient    Mike Mota MD

## 2018-03-27 ENCOUNTER — TELEPHONE (OUTPATIENT)
Dept: PULMONOLOGY | Facility: CLINIC | Age: 48
End: 2018-03-27

## 2018-04-11 ENCOUNTER — TELEPHONE (OUTPATIENT)
Dept: PULMONOLOGY | Facility: CLINIC | Age: 48
End: 2018-04-11

## 2018-04-12 ENCOUNTER — TELEPHONE (OUTPATIENT)
Dept: PHARMACY | Facility: CLINIC | Age: 48
End: 2018-04-12

## 2018-04-12 NOTE — TELEPHONE ENCOUNTER
Good Morning.     The prior authorization for Mrs. Johny Aguila-Neb solution has been denied. The patient's Medicare part D prescription plan denied the prescription, but the medication is covered on her Medicare part B insurance plan.    The patient has been notified and is aware.    Thanks.    Carolyn Reardon CpT.  Patient Care Advocate  [facility]  [email or web address]

## 2018-04-26 ENCOUNTER — TELEPHONE (OUTPATIENT)
Dept: OPHTHALMOLOGY | Facility: CLINIC | Age: 48
End: 2018-04-26

## 2018-05-15 ENCOUNTER — TELEPHONE (OUTPATIENT)
Dept: PULMONOLOGY | Facility: CLINIC | Age: 48
End: 2018-05-15

## 2018-05-22 ENCOUNTER — TELEPHONE (OUTPATIENT)
Dept: PULMONOLOGY | Facility: CLINIC | Age: 48
End: 2018-05-22

## 2018-05-23 ENCOUNTER — TELEPHONE (OUTPATIENT)
Dept: OPHTHALMOLOGY | Facility: CLINIC | Age: 48
End: 2018-05-23

## 2018-05-23 NOTE — TELEPHONE ENCOUNTER
----- Message from Saadia Knapp sent at 5/22/2018  4:51 PM CDT -----  Contact: Pt   Pt called and stated she needs to schedule an appt she missed with the doctor. She can be reached at [phone].    Thanks,  Tf

## 2018-05-23 NOTE — TELEPHONE ENCOUNTER
I left a message for the patient to call me. I was calling the patient to schedule her an appointment to see Dr. Omer.

## 2018-05-28 DIAGNOSIS — D86.9 SARCOIDOSIS: ICD-10-CM

## 2018-05-28 DIAGNOSIS — J45.40 MODERATE PERSISTENT ASTHMA WITHOUT COMPLICATION: Chronic | ICD-10-CM

## 2018-05-29 RX ORDER — PREDNISONE 10 MG/1
TABLET ORAL
Qty: 32 TABLET | Refills: 0 | Status: SHIPPED | OUTPATIENT
Start: 2018-05-29 | End: 2018-08-02 | Stop reason: SDUPTHER

## 2018-06-04 ENCOUNTER — OFFICE VISIT (OUTPATIENT)
Dept: OPHTHALMOLOGY | Facility: CLINIC | Age: 48
End: 2018-06-04
Payer: MEDICARE

## 2018-06-04 DIAGNOSIS — Z91.148 NON COMPLIANCE W MEDICATION REGIMEN: ICD-10-CM

## 2018-06-04 DIAGNOSIS — H20.9 UVEITIC GLAUCOMA OF BOTH EYES, SEVERE STAGE: Primary | ICD-10-CM

## 2018-06-04 DIAGNOSIS — H54.8 LEGAL BLINDNESS, AS DEFINED IN USA: ICD-10-CM

## 2018-06-04 DIAGNOSIS — H40.43X3 UVEITIC GLAUCOMA OF BOTH EYES, SEVERE STAGE: Primary | ICD-10-CM

## 2018-06-04 DIAGNOSIS — D86.9 SARCOIDOSIS: ICD-10-CM

## 2018-06-04 PROCEDURE — 92012 INTRM OPH EXAM EST PATIENT: CPT | Mod: S$PBB,,, | Performed by: OPHTHALMOLOGY

## 2018-06-04 PROCEDURE — 99999 PR PBB SHADOW E&M-EST. PATIENT-LVL I: CPT | Mod: PBBFAC,,, | Performed by: OPHTHALMOLOGY

## 2018-06-04 PROCEDURE — 99211 OFF/OP EST MAY X REQ PHY/QHP: CPT | Mod: PBBFAC | Performed by: OPHTHALMOLOGY

## 2018-06-04 RX ORDER — ATROPINE SULFATE 10 MG/ML
1 SOLUTION/ DROPS OPHTHALMIC 2 TIMES DAILY
Qty: 5 ML | Refills: 3 | Status: SHIPPED | OUTPATIENT
Start: 2018-06-04 | End: 2021-07-26

## 2018-06-04 RX ORDER — LATANOPROST 50 UG/ML
1 SOLUTION/ DROPS OPHTHALMIC NIGHTLY
Qty: 2.5 ML | Refills: 6 | Status: SHIPPED | OUTPATIENT
Start: 2018-06-04 | End: 2021-07-26

## 2018-06-04 NOTE — PROGRESS NOTES
HPI     Patient returns for a 4 month iop check, patient saw [facility]  4 months ago   and had BSCAN.  Pt has multiple bottles of drops with her today - timolol, ciloixan,   cosopt and pred- pt is unsure of which drops she is using- due to her   vision       UVEITIC GLAUCOMA  SARCOID UVEITIS  H/O NON COMPLIANCE WITH TX  S/p cyclo cryo os 2014? Pt unsure of dates   S/o Tube shunt os 2014     PRED BID OU   COSOPT ONCE DAILY OD  ATROPINE BID OU ( DOES NOT HAVE )    Last edited by Ulisses Omer MD on 6/4/2018 10:21 AM. (History)            Assessment /Plan     For exam results, see Encounter Report.      ICD-10-CM ICD-9-CM    1. Uveitic glaucoma of both eyes, severe stage H40.43X3 365.62 IOP not within acceptable range relative to target IOP with risk of irreversible visual loss. Additional treatment required.  Discussed options, risks, and benefits of additional medication, SLT laser, or G--6 Laser     recommend stopping steroid first to see if IOP will fall   Consider G-6 in the future   Consider k- eval in the future       Patient chooses above     Reviewed importance of continued compliance with treatment and follow up.       H20.9 365.73    2. Sarcoidosis D86.9 135    3. Non compliance w medication regimen Z91.14 V15.81 Resume meds and urge compliance    4. Legal blindness, as defined in [place] H54.8 369.4        Stop Pred for now and see if IOP falls-   Start Latanoprost QHS OU   COSOPT BID OU   Resume atropine BID OU     RETURN TO CLINIC 3-4 weeks IOP at [facility]

## 2018-06-07 ENCOUNTER — HOSPITAL ENCOUNTER (OUTPATIENT)
Dept: RADIOLOGY | Facility: HOSPITAL | Age: 48
Discharge: HOME | End: 2018-06-07
Attending: INTERNAL MEDICINE
Payer: MEDICARE

## 2018-06-07 ENCOUNTER — PROCEDURE VISIT (OUTPATIENT)
Dept: PULMONOLOGY | Facility: CLINIC | Age: 48
End: 2018-06-07
Payer: MEDICARE

## 2018-06-07 VITALS — WEIGHT: 260.13 LBS | BODY MASS INDEX: 46.09 KG/M2 | HEIGHT: 63 IN

## 2018-06-07 DIAGNOSIS — D86.9 SARCOIDOSIS: ICD-10-CM

## 2018-06-07 DIAGNOSIS — J45.40 MODERATE PERSISTENT ASTHMA WITHOUT COMPLICATION: Chronic | ICD-10-CM

## 2018-06-07 LAB
ALLENS TEST: ABNORMAL
BRPFT: ABNORMAL
DELSYS: ABNORMAL
DLCO ADJ PRE: 15.65 ML/(MIN*MMHG) (ref 18–29.46)
DLCO PRE: 14.59 ML/(MIN*MMHG) (ref 18–29.46)
DLCO SINGLE BREATH LLN: 18
DLCO SINGLE BREATH PRE REF: 61.5 %
DLCO SINGLE BREATH REF: 23.73
DLCOC SBVA LLN: 3.39
DLCOC SBVA PRE REF: 123.3 %
DLCOC SBVA REF: 4.97
DLCOC SINGLE BREATH LLN: 18
DLCOC SINGLE BREATH PRE REF: 65.9 %
DLCOC SINGLE BREATH REF: 23.73
DLCOVA LLN: 3.39
DLCOVA PRE REF: 115 %
DLCOVA PRE: 5.72 ML/(MIN*MMHG*L) (ref 3.39–6.56)
DLCOVA REF: 4.97
DLVAADJ PRE: 6.14 ML/(MIN*MMHG*L) (ref 3.39–6.56)
ERV LLN: 0.95
ERV PRE REF: 16.3 %
ERV PRE: 0.15 L (ref 0.95–0.95)
ERV REF: 0.95
ERVN2 LLN: 0.95
ERVN2 REF: 0.95
FEF 25 75 LLN: 1.15
FEF 25 75 PRE REF: 81.8 %
FEF 25 75 PRE: 1.93 L/S (ref 1.15–3.57)
FEF 25 75 REF: 2.36
FET100 PRE: 3.9 SEC
FEV1 FVC LLN: 70
FEV1 FVC PRE REF: 103.6 %
FEV1 FVC PRE: 84.04 % (ref 70.29–91.98)
FEV1 FVC REF: 81
FEV1 LLN: 1.74
FEV1 PRE REF: 66.7 %
FEV1 PRE: 1.54 L (ref 1.74–2.87)
FEV1 REF: 2.31
FEV6 LLN: 2.05
FEV6 REF: 2.73
FRCN2 LLN: 1.81
FRCN2 REF: 2.63
FRCPL PRE: 2.06 L
FRCPLETH LLN: 1.81
FRCPLETH PREREF: 78.4 %
FRCPLETH REF: 2.63
FVC LLN: 2.18
FVC PRE REF: 64.1 %
FVC PRE: 1.83 L (ref 2.18–3.52)
FVC REF: 2.85
HCO3 UR-SCNC: 29.4 MMOL/L (ref 24–28)
IVC PRE: 1.72 L (ref 2.18–3.52)
IVC SINGLE BREATH LLN: 2.18
IVC SINGLE BREATH PRE REF: 60.2 %
IVC SINGLE BREATH REF: 2.85
MODE: ABNORMAL
MVV LLN: 85
MVV REF: 100
PCO2 BLDA: 45.4 MMHG (ref 35–45)
PEF LLN: 4.13
PEF PRE REF: 79.3 %
PEF PRE: 4.82 L/S (ref 4.13–8.02)
PEF REF: 6.08
PH SMN: 7.42 [PH] (ref 7.35–7.45)
PO2 BLDA: 87 MMHG (ref 80–100)
POC BE: 5 MMOL/L
POC SATURATED O2: 97 % (ref 95–100)
RAW LLN: 3.06
RAW PRE REF: 167.8 %
RAW PRE: 5.13 CMH2O*S/L (ref 3.06–3.06)
RAW REF: 3.06
RV LLN: 1.1
RV PRE REF: 112.8 %
RV PRE: 1.9 L (ref 1.1–2.26)
RV REF: 1.68
RVN2 LLN: 1.1
RVN2 REF: 1.68
RVN2TLCN2 LLN: 26
RVN2TLCN2 REF: 36
RVTLC LLN: 26
RVTLC PRE REF: 137.6 %
RVTLC PRE: 49.03 % (ref 26.03–45.21)
RVTLC REF: 36
SAMPLE: ABNORMAL
SITE: ABNORMAL
TLC LLN: 3.78
TLC PRE REF: 81 %
TLC PRE: 3.87 L (ref 3.78–5.76)
TLC REF: 4.77
TLCN2 LLN: 3.78
TLCN2 REF: 4.77
VA PRE: 2.56 L (ref 3.79–6)
VA SINGLE BREATH LLN: 3.79
VA SINGLE BREATH PRE REF: 52.2 %
VA SINGLE BREATH REF: 4.9
VC LLN: 2.18
VC PRE REF: 69 %
VC PRE: 1.97 L (ref 2.18–3.52)
VC REF: 2.85
VCMAXN2 LLN: 2.18
VCMAXN2 REF: 2.85
VTGRAWPRE: 2.27 L

## 2018-06-07 PROCEDURE — 36600 WITHDRAWAL OF ARTERIAL BLOOD: CPT | Mod: PBBFAC

## 2018-06-07 PROCEDURE — 71046 X-RAY EXAM CHEST 2 VIEWS: CPT | Mod: 26,,, | Performed by: RADIOLOGY

## 2018-06-07 PROCEDURE — 94726 PLETHYSMOGRAPHY LUNG VOLUMES: CPT | Mod: 26,S$PBB,, | Performed by: INTERNAL MEDICINE

## 2018-06-07 PROCEDURE — 94010 BREATHING CAPACITY TEST: CPT | Mod: 26,59,S$PBB, | Performed by: INTERNAL MEDICINE

## 2018-06-07 PROCEDURE — 36600 WITHDRAWAL OF ARTERIAL BLOOD: CPT | Mod: 59,S$PBB,, | Performed by: INTERNAL MEDICINE

## 2018-06-07 PROCEDURE — 94010 BREATHING CAPACITY TEST: CPT | Mod: PBBFAC

## 2018-06-07 PROCEDURE — 94729 DIFFUSING CAPACITY: CPT | Mod: 26,S$PBB,, | Performed by: INTERNAL MEDICINE

## 2018-06-07 PROCEDURE — 94618 PULMONARY STRESS TESTING: CPT | Mod: PBBFAC

## 2018-06-07 PROCEDURE — 71046 X-RAY EXAM CHEST 2 VIEWS: CPT | Mod: TC

## 2018-06-07 PROCEDURE — 94618 PULMONARY STRESS TESTING: CPT | Mod: 26,S$PBB,, | Performed by: INTERNAL MEDICINE

## 2018-06-07 PROCEDURE — 94729 DIFFUSING CAPACITY: CPT | Mod: PBBFAC

## 2018-06-07 PROCEDURE — 94726 PLETHYSMOGRAPHY LUNG VOLUMES: CPT | Mod: PBBFAC

## 2018-06-07 PROCEDURE — 82803 BLOOD GASES ANY COMBINATION: CPT | Mod: PBBFAC

## 2018-06-07 NOTE — PROCEDURES
"[facility] - Pulm Function Svcs  Six Minute Walk     SUMMARY     Ordering Provider: Dr. Mota   Interpreting Provider: Dr. Mota  Performing nurse/tech/RT: PAULO Avilez CRT  Diagnosis: Sarcoidosis  Height: 5' 3" (160 cm)  Weight: 118 kg (260 lb 2.3 oz)  BMI (Calculated): 46.2   Patient Race:             Phase Oxygen Assessment Supplemental O2 Heart   Rate Blood Pressure Alexei Dyspnea Scale Rating   Resting 97 % Room Air 84 bpm 125/75 5-6   Exercise        Minute        1 97 % Room Air 105 bpm     2 97 % Room Air 105 bpm     3 96 % Room Air 106 bpm     4 94 % Room Air 111 bpm     5 97 % Room Air 105 bpm     6  94 % Room Air 111 bpm 136/77 7-8   Recovery        Minute        1 98 % Room Air 98 bpm     2 99 % Room Air 92 bpm     3 98 % Room Air 88 bpm     4 97 % Room Air 89 bpm 132/76 3     Six Minute Walk Summary  6MWT Status: completed with stops  Patient Reported: Dyspnea     Interpretation:  Did the patient stop or pause?: Yes  How many times did the patient stop or pause?: 3  Stop Time 1: 132.6  Restart Time 1: 138  Pause Time 1: 1.8 seconds  Stop Time 2: 188.4  Restart Time 2: 192  Pause Time 2: 3.6 seconds  Stop Time 3: 311.4 seconds  Restart Time 3: 314.4 seconds  Pause Time 3: 3 seconds           Total Time Walked (Calculated): 351.6 seconds  Final Partial Lap Distance (feet): 100 feet  Total Distance Meters (Calculated): 152.4 meters  Predicted Distance Meters (Calculated): 451.16 meters  Percentage of Predicted (Calculated): 33.78  Peak VO2 (Calculated): 8.55  Mets: 2.44  Has The Patient Had a Previous Six Minute Walk Test?: Yes       Previous 6MWT Results  Has The Patient Had a Previous Six Minute Walk Test?: Yes  Date of Previous Test: 06/28/18  Total Time Walked: 360 seconds  Total Distance (meters): 121.92  Predicted Distance (meters): 464.36 meters  Percentage of Predicted: 26.26  Percent Change (Calculated): -0.25      REPORT  This is completed compared to prior study improved.  Distance " completed was 152.4 m (33.78% predicted).  Last study: 121.92 meters (26.26% predicted.)  Exercise capacity still severely impaired.  Dyspnea was moderate  No exercise desaturation    Mike Mota MD

## 2018-06-07 NOTE — PROCEDURES
See ABG Results    REPORT    Mixed respiratory acidosis / metabolic alkalosis    (expected Pco2 = 39 - 43)    expected pH = 7.43  expected CO2 = 47  expected HCO3- = 28    Mike Mota MD

## 2018-06-12 ENCOUNTER — TELEPHONE (OUTPATIENT)
Dept: PULMONOLOGY | Facility: CLINIC | Age: 48
End: 2018-06-12

## 2018-06-20 ENCOUNTER — OFFICE VISIT (OUTPATIENT)
Dept: PULMONOLOGY | Facility: CLINIC | Age: 48
End: 2018-06-20
Payer: MEDICARE

## 2018-06-20 VITALS
HEART RATE: 89 BPM | SYSTOLIC BLOOD PRESSURE: 130 MMHG | WEIGHT: 266.13 LBS | RESPIRATION RATE: 18 BRPM | BODY MASS INDEX: 47.15 KG/M2 | HEIGHT: 63 IN | DIASTOLIC BLOOD PRESSURE: 70 MMHG | OXYGEN SATURATION: 98 %

## 2018-06-20 DIAGNOSIS — G47.33 OSA ON CPAP: ICD-10-CM

## 2018-06-20 DIAGNOSIS — E66.01 MORBID OBESITY WITH BODY MASS INDEX (BMI) OF 40.0 TO 49.9: ICD-10-CM

## 2018-06-20 DIAGNOSIS — R60.0 EDEMA OF LEG: ICD-10-CM

## 2018-06-20 DIAGNOSIS — J45.40 MODERATE PERSISTENT ASTHMA WITHOUT COMPLICATION: Primary | Chronic | ICD-10-CM

## 2018-06-20 DIAGNOSIS — D86.9 SARCOIDOSIS: ICD-10-CM

## 2018-06-20 PROBLEM — J45.901 SEVERE ASTHMA WITH ACUTE EXACERBATION: Status: RESOLVED | Noted: 2018-02-13 | Resolved: 2018-06-20

## 2018-06-20 PROCEDURE — 99999 PR PBB SHADOW E&M-EST. PATIENT-LVL IV: CPT | Mod: PBBFAC,,, | Performed by: INTERNAL MEDICINE

## 2018-06-20 PROCEDURE — 99214 OFFICE O/P EST MOD 30 MIN: CPT | Mod: PBBFAC | Performed by: INTERNAL MEDICINE

## 2018-06-20 PROCEDURE — 99214 OFFICE O/P EST MOD 30 MIN: CPT | Mod: S$PBB,,, | Performed by: INTERNAL MEDICINE

## 2018-06-20 RX ORDER — FUROSEMIDE 20 MG/1
20 TABLET ORAL EVERY OTHER DAY
Qty: 15 TABLET | Refills: 1 | Status: SHIPPED | OUTPATIENT
Start: 2018-06-20 | End: 2018-10-29

## 2018-06-20 NOTE — PROGRESS NOTES
Subjective:       Patient ID: Jennifer Mcclain is a 47 y.o. female.    Chief Complaint: Asthma (rev tests) and Sleep Apnea    Ms. Jennifer Mcclain is 47 years old  She has advanced oculus sarcoid with blindness.  Followed up for uveitis by Ophthalmology  Asthma score was 13.  Eugene sleepiness score was 9.  Most of her symptoms are related to shortness of breath with activity.  She has noted some lower extremity edema.  I reviewed her spirometry shows FEV1 improved from 1.37 now 1.54.  FVC also improved from 1.38 now 1.83.  She however perceives that her inhalers are not sufficient.  I suspect this may be related to a component of fluid overload  Additional Lasix will be given  Patient has oxygen which was left in [place]  She needs a new order for continuation were late for [place]  Quantification test will be ordered  Her compliance with CPAP has been poor.  Admonished patient to improve    Her inhalers   Breo Ellipta    Immunizations are up-to-date  Chest x-ray was clear  I have reviewed the patient's medical history in detail and updated the computerized patient record.      Asthma   There is no shortness of breath, sputum production or wheezing. Associated symptoms include postnasal drip. Her past medical history is significant for asthma.     Review of Systems   Constitutional: Negative.    HENT: Positive for postnasal drip.    Eyes: Negative.    Respiratory: Negative.  Positive for dyspnea on extertion. Negative for snoring, sputum production, shortness of breath and wheezing.    Cardiovascular: Positive for leg swelling.   Genitourinary: Negative.    Endocrine: endocrine negative   Musculoskeletal: Negative.    Skin: Negative.    Gastrointestinal: Negative.    Neurological: Negative.    Psychiatric/Behavioral: Negative.        Answers for HPI/ROS submitted by the patient on 6/20/2018   Asthma  In the past 4 weeks, how much of the time did your asthma keep you from getting as much done at work, school, or at  "home?: some of the time  During the past 4 weeks, how often have you had shortness of breath?: more than once a day  During the past 4 weeks, how often did your asthma symptoms (Wheezing, coughing, shortness of breath, chest tightness or pain) wake you up at night or earlier that usual in the morning?: not at all  During the past 4 weeks, how often have you used your rescue inhaler or nebulizer medication (such as albuterol)?: 3 or more times per day  How would you rate your asthma control during the past 4 weeks?: somewhat controlled   : 13    Objective:       Vitals:    06/20/18 1638   BP: 130/70   Pulse: 89   Resp: 18   SpO2: 98%   Weight: 120.7 kg (266 lb 1.5 oz)   Height: 5' 3" (1.6 m)     Physical Exam   Constitutional: She is oriented to person, place, and time. She appears well-developed and well-nourished. She is obese.   HENT:   Head: Normocephalic.   Nose: Nose normal.   Neck: Normal range of motion. Neck supple.   Cardiovascular: Normal rate, regular rhythm and normal heart sounds.    Pulmonary/Chest: Normal expansion, symmetric chest wall expansion, effort normal and breath sounds normal.   Abdominal: Soft.   Musculoskeletal: Normal range of motion. She exhibits edema.   Lymphadenopathy:     She has no cervical adenopathy.   Neurological: She is alert and oriented to person, place, and time. She has normal reflexes.   Skin: Skin is warm and dry. Nails show no clubbing.   Psychiatric: She has a normal mood and affect.   Nursing note and vitals reviewed.    Personal Diagnostic Review  Chest x-ray:   Findings: The lungs are clear and free of infiltrate.  No pleural effusion or pneumothorax is identified.  The heart is enlarged.    Pulmonary function tests: FEV1: 1.54  (66 % predicted), FVC:  1.83 (64 % predicted), FEV1/FVC:  84    Arterial Blood Gas result:  pO2 87; fHP777.4; pH 7.41;  HCO3 29.4, %O2 Sat 97.    No flowsheet data found.      Assessment:       Problem List Items Addressed This Visit     " Moderate persistent asthma without complication - Primary (Chronic)    Relevant Orders    PULSE OXIMETRY OVERNIGHT    Spirometry with/without bronchodilator    Sarcoidosis     Advanced occular sarcoid         Relevant Orders    Stress test, pulmonary    PULSE OXIMETRY OVERNIGHT    Spirometry with/without bronchodilator    GEN on CPAP     CPAP adherence stressed         Relevant Orders    PULSE OXIMETRY OVERNIGHT    Morbid obesity with body mass index (BMI) of 40.0 to 49.9     General weight loss/lifestyle modification strategies discussed (elicit support from others; identify saboteurs; non-food rewards).  Diet interventions: low calorie (1000 kCal/d) deficit diet             Other Visit Diagnoses     Edema of leg        Relevant Medications    furosemide (LASIX) 20 MG tablet    Other Relevant Orders    Stress test, pulmonary    PULSE OXIMETRY OVERNIGHT    Basic metabolic panel        Plan:       Improve CPAP adherence.  Had BREO and nebulizer  New orders for oxygen qualification  Additional Lasix for leg edema.    Follow-up in about 4 weeks (around 7/18/2018) for Weight loss and exercise, 6MWD test, Spirometry, Overnight SAT, Download, CPAP supplies, BMP, CMP or Standing BMP.    This note was prepared using voice recognition system and is likely to have sound alike errors that may have been overlooked even after proof reading.  Please call me with any questions    Discussed diagnosis, its evaluation, treatment and usual course. All questions answered.    Thank you for the courtesy of participating in the care of this patient    Mike Mota MD

## 2018-06-22 ENCOUNTER — PROCEDURE VISIT (OUTPATIENT)
Dept: PULMONOLOGY | Facility: CLINIC | Age: 48
End: 2018-06-22
Payer: MEDICARE

## 2018-06-22 ENCOUNTER — LAB VISIT (OUTPATIENT)
Dept: LAB | Facility: HOSPITAL | Age: 48
End: 2018-06-22
Attending: INTERNAL MEDICINE
Payer: MEDICARE

## 2018-06-22 VITALS — HEIGHT: 62 IN | WEIGHT: 266 LBS | BODY MASS INDEX: 48.95 KG/M2

## 2018-06-22 DIAGNOSIS — D86.9 SARCOIDOSIS: ICD-10-CM

## 2018-06-22 DIAGNOSIS — G47.33 OSA ON CPAP: ICD-10-CM

## 2018-06-22 DIAGNOSIS — R60.0 EDEMA OF LEG: ICD-10-CM

## 2018-06-22 DIAGNOSIS — J45.40 MODERATE PERSISTENT ASTHMA WITHOUT COMPLICATION: Chronic | ICD-10-CM

## 2018-06-22 LAB
ANION GAP SERPL CALC-SCNC: 8 MMOL/L
BUN SERPL-MCNC: 9 MG/DL
CALCIUM SERPL-MCNC: 9.9 MG/DL
CHLORIDE SERPL-SCNC: 105 MMOL/L
CO2 SERPL-SCNC: 30 MMOL/L
CREAT SERPL-MCNC: 0.8 MG/DL
EST. GFR  (AFRICAN AMERICAN): >60 ML/MIN/1.73 M^2
EST. GFR  (NON AFRICAN AMERICAN): >60 ML/MIN/1.73 M^2
GLUCOSE SERPL-MCNC: 93 MG/DL
POTASSIUM SERPL-SCNC: 3.5 MMOL/L
SODIUM SERPL-SCNC: 143 MMOL/L

## 2018-06-22 PROCEDURE — 94618 PULMONARY STRESS TESTING: CPT | Mod: 26,S$PBB,, | Performed by: INTERNAL MEDICINE

## 2018-06-22 PROCEDURE — 80048 BASIC METABOLIC PNL TOTAL CA: CPT

## 2018-06-22 PROCEDURE — 36415 COLL VENOUS BLD VENIPUNCTURE: CPT

## 2018-06-22 PROCEDURE — 94060 EVALUATION OF WHEEZING: CPT | Mod: PBBFAC

## 2018-06-22 PROCEDURE — 94618 PULMONARY STRESS TESTING: CPT | Mod: PBBFAC

## 2018-06-22 PROCEDURE — 94060 EVALUATION OF WHEEZING: CPT | Mod: 26,59,S$PBB, | Performed by: INTERNAL MEDICINE

## 2018-06-22 PROCEDURE — 94762 N-INVAS EAR/PLS OXIMTRY CONT: CPT | Mod: PBBFAC

## 2018-06-22 NOTE — PROCEDURES
"[facility] - Pulm Function [facility]  Six Minute Walk     SUMMARY     Ordering Provider: Guanako   Interpreting Provider: Guanako  Performing nurse/tech/RT: Cyndy Huber  Diagnosis: Sarcoidosis (Moderate Asthma)  Height: 5' 2" (157.5 cm)  Weight: 120.7 kg (266 lb)  BMI (Calculated): 48.8   Patient Race:             Phase Oxygen Assessment Supplemental O2 Heart   Rate Blood Pressure Alexei Dyspnea Scale Rating   Resting 99 % Room Air 83 bpm 151/68 7-8   Exercise        Minute        1 96 % Room Air 94 bpm     2 96 % Room Air 101 bpm     3 98 % Room Air 107 bpm     4 97 % Room Air 103 bpm     5 98 % Room Air 98 bpm     6  98 % Room Air 102 bpm 170/79 10   Recovery        Minute        1 99 % Room Air 96 bpm     2 98 % Room Air 91 bpm     3 99 % Room Air 83 bpm     4 99 % Room Air 85 bpm 180/83 7-8     Six Minute Walk Summary  6MWT Status: completed with stops  Patient Reported:  (Lower Back Pain)     Interpretation:  Did the patient stop or pause?: Yes  How many times did the patient stop or pause?: 3  Stop Time 1: 124  Restart Time 1: 134  Pause Time 1: 10 seconds  Stop Time 2: 225  Restart Time 2: 235  Pause Time 2: 10 seconds  Stop Time 3: 262 seconds  Restart Time 3: 292 seconds  Pause Time 3: 30 seconds           Total Time Walked (Calculated): 310 seconds  Final Partial Lap Distance (feet): 0 feet  Total Distance Meters (Calculated): 182.88 meters  Predicted Distance Meters (Calculated): 439.8 meters  Percentage of Predicted (Calculated): 41.58  Peak VO2 (Calculated): 9.47  Mets: 2.71  Has The Patient Had a Previous Six Minute Walk Test?: Yes       Previous 6MWT Results  Has The Patient Had a Previous Six Minute Walk Test?: Yes  Date of Previous Test: 06/07/18  Total Time Walked: 152 seconds  Total Distance (meters): 152  Predicted Distance (meters): 451 meters  Percentage of Predicted: 33  Percent Change (Calculated): -0.2         CLINICAL INTERPRETATION:  Six minute walk distance is 182.88m (41.58 " % predicted) with very, very heavy dyspnea.  During exercise, there was no significant desaturation while breathing room air.  Based upon age and body mass index, exercise capacity is less than predicted.  Low back pain was reported: clinical correlation    Mike Mota MD

## 2018-06-24 LAB
BRPFT: ABNORMAL
FEF 25 75 CHG: -13.5 %
FEF 25 75 LLN: 1.12
FEF 25 75 POST REF: 28.8 %
FEF 25 75 POST: 0.66 L/S (ref 1.12–3.89)
FEF 25 75 PRE REF: 33.3 %
FEF 25 75 PRE: 0.77 L/S (ref 1.12–3.89)
FEF 25 75 REF: 2.31
FET100 CHG: 20.9 %
FET100 POST: 8.53 SEC
FET100 PRE: 7.06 SEC
FEV1 CHG: -10.3 %
FEV1 FVC CHG: -1.7 %
FEV1 FVC LLN: 70
FEV1 FVC POST REF: 87.1 %
FEV1 FVC POST: 70.82 % (ref 70.4–90.46)
FEV1 FVC PRE REF: 88.6 %
FEV1 FVC PRE: 72.03 % (ref 70.4–90.46)
FEV1 FVC REF: 81
FEV1 LLN: 1.69
FEV1 POST REF: 45.3 %
FEV1 POST: 1.01 L (ref 1.69–2.75)
FEV1 PRE REF: 50.4 %
FEV1 PRE: 1.12 L (ref 1.69–2.75)
FEV1 REF: 2.23
FEV6 CHG: -12.8 %
FEV6 LLN: 1.96
FEV6 POST REF: 51.4 %
FEV6 POST: 1.35 L (ref 1.96–3.28)
FEV6 PRE REF: 59 %
FEV6 PRE: 1.54 L (ref 1.96–3.28)
FEV6 REF: 2.62
FVC CHG: -8.7 %
FVC LLN: 2.1
FVC POST REF: 51.8 %
FVC POST: 1.43 L (ref 2.1–3.43)
FVC PRE REF: 56.7 %
FVC PRE: 1.56 L (ref 2.1–3.43)
FVC REF: 2.75
MVV LLN: 81
MVV REF: 96
PEF CHG: -20.7 %
PEF LLN: 4.04
PEF POST REF: 43.9 %
PEF POST: 2.6 L/S (ref 4.04–7.8)
PEF PRE REF: 55.4 %
PEF PRE: 3.28 L/S (ref 4.04–7.8)
PEF REF: 5.92

## 2018-06-27 ENCOUNTER — TELEPHONE (OUTPATIENT)
Dept: PULMONOLOGY | Facility: CLINIC | Age: 48
End: 2018-06-27

## 2018-06-27 NOTE — TELEPHONE ENCOUNTER
Patient called to inquiry about overnight pulse OX monitor that was picked up on 6/22. Left message for patient to contact clinic.

## 2018-06-28 DIAGNOSIS — J45.40 MODERATE PERSISTENT ASTHMA WITHOUT COMPLICATION: Chronic | ICD-10-CM

## 2018-06-28 DIAGNOSIS — H54.8 LEGAL BLINDNESS: ICD-10-CM

## 2018-06-28 DIAGNOSIS — G47.34 NOCTURNAL HYPOXEMIA: Primary | ICD-10-CM

## 2018-06-28 DIAGNOSIS — D86.9 SARCOIDOSIS: ICD-10-CM

## 2018-06-28 NOTE — PROGRESS NOTES
REPORT    OVERNIGHT OXIMETRY REPORT:    Dictated by: Mike Mota MD  Test date: 2018  Dictated on: 2018      Comment: This test was performed on 10 cm and CPAP     A desaturation event was defined as a decrease of saturation by 4 or more.    REPORT SUMMARY  Total valid samplin:31:28   High SpO2: 99%    Low SpO2: 75%    Mean SpO2  94.0 %  Cumulative time with oxygen saturation less than 88% (TC88): 00:19:36    CLINICAL INTERPRETATION   There is  significant nocturnal oxygen desaturation and  Clinical correlation is advised    Medicare Criteria Comments:   Oximetry test results suggest the patient falls under Medicare Group 1 Criteria. ( Arterial oxygen saturation at or below 88% for at least 5 minutes taken during sleep)  Mike Mota MD    Details about Medicare Group Criteria coverage can be found at http://www.cms.hhs.gov/manuals/downloads/

## 2018-06-28 NOTE — PROCEDURES
[facility]  [address] * [place] [address]  Telephone: [phone]  Test date: 18 Start: 18 00:31:02 Jennifer Mcclain  Doctor: Dr. Mota End: 18 08:02:30 [id number]  Oximetry: Summary Report  Comments: CPAP [id number]  Recording time: :: Highest pulse: 105 Highest SpO2: 99%  Excluded samplin:00:00 Lowest pulse: 69 Lowest SpO2: 75%  Total valid samplin:: Mean pulse: 78 Mean SpO2: 94.0%  1 S.D.: 3.7 1 S.D.: 3.0  Time with SpO2<90: 0:36:12, 8.0%  Time with SpO2<80: 0:01:16, 0.3%  Time with SpO2<70: 0:00:00, 0.0%  Time with SpO2<60: 0:00:00, 0.0%  Time with SpO2<88: 0:19:36, 4.3%  Time with SpO2 =>90: 6:55:16, 92.0%  Time with SpO2=>80 & <90: 0:34:56, 7.7%  Time with SpO2=>70 & <80: 0:01:16, 0.3%  Time with SpO2=>60 & <70: 0:00:00, 0.0%  The longest continuous time with saturation <=89 was 00:03:24, which started at  18 04:01:10.  A desaturation event was defined as a decrease of saturation by 4 or more.  No events were excluded due to artifact.  There were 16 desaturation events over 3 minutes duration.  There were 165 desaturation events of less than 3 minutes duration during which:  The mean high was 94.6%. The mean low was 88.0%.  The number of these events that were:  > 0 & <10 seconds: 38 > 0 seconds: 165  =>10 & <20 seconds: 39 =>10 seconds: 127  =>20 & <30 seconds: 29 =>20 seconds: 88  =>30 & <40 seconds: 13 =>30 seconds: 59  =>40 & <50 seconds: 9 =>40 seconds: 46  =>50 & <60 seconds: 5 =>50 seconds: 37  =>60 seconds: 32 =>60 seconds: 32  The mean length of desaturation events that were >=10 sec & <=3 mins was: 41.3 sec.  Desaturation event index (events >=10 sec per sampled hour): 16.9  Desaturation event index (events >= 0 sec per sampled hour): 21.9  © 2929-0926 [employer] Oximetry version Standard [id number].  Oximeter: [employer] 920M Plus memory, 4 second resolution.    REPORT    OVERNIGHT OXIMETRY REPORT:    Dictated  by: Mike Mota MD  Test date: 2018  Dictated on: 2018      Comment: This test was performed on 10 cm and CPAP     A desaturation event was defined as a decrease of saturation by 4 or more.    REPORT SUMMARY  Total valid samplin:31:28   High SpO2: 99%    Low SpO2: 75%    Mean SpO2  94.0 %  Cumulative time with oxygen saturation less than 88% (TC88): 00:19:36    CLINICAL INTERPRETATION   There is  significant nocturnal oxygen desaturation and  Clinical correlation is advised    Medicare Criteria Comments:   Oximetry test results suggest the patient falls under Medicare Group 1 Criteria. ( Arterial oxygen saturation at or below 88% for at least 5 minutes taken during sleep)  Mike Mota MD    Details about Medicare Group Criteria coverage can be found at http://www.cms.hhs.gov/manuals/downloads/

## 2018-07-10 ENCOUNTER — OFFICE VISIT (OUTPATIENT)
Dept: OPHTHALMOLOGY | Facility: CLINIC | Age: 48
End: 2018-07-10
Payer: MEDICARE

## 2018-07-10 DIAGNOSIS — H20.9 UVEITIC GLAUCOMA OF BOTH EYES, SEVERE STAGE: Primary | ICD-10-CM

## 2018-07-10 DIAGNOSIS — H54.8 LEGAL BLINDNESS, AS DEFINED IN USA: ICD-10-CM

## 2018-07-10 DIAGNOSIS — H54.8 LEGAL BLINDNESS: ICD-10-CM

## 2018-07-10 DIAGNOSIS — H40.43X3 UVEITIC GLAUCOMA OF BOTH EYES, SEVERE STAGE: Primary | ICD-10-CM

## 2018-07-10 DIAGNOSIS — D86.9 SARCOIDOSIS: ICD-10-CM

## 2018-07-10 PROCEDURE — 92012 INTRM OPH EXAM EST PATIENT: CPT | Mod: S$PBB,,, | Performed by: OPHTHALMOLOGY

## 2018-07-10 PROCEDURE — 99212 OFFICE O/P EST SF 10 MIN: CPT | Mod: PBBFAC,PO | Performed by: OPHTHALMOLOGY

## 2018-07-10 PROCEDURE — 99999 PR PBB SHADOW E&M-EST. PATIENT-LVL II: CPT | Mod: PBBFAC,,, | Performed by: OPHTHALMOLOGY

## 2018-07-10 RX ORDER — KETOROLAC TROMETHAMINE 5 MG/ML
1 SOLUTION OPHTHALMIC 4 TIMES DAILY
Qty: 5 ML | Refills: 1 | Status: SHIPPED | OUTPATIENT
Start: 2018-07-10 | End: 2018-08-09

## 2018-07-10 NOTE — PROGRESS NOTES
HPI     Glaucoma    Additional comments: 3-4 wk IOP ck. occasional pain off pred, but not   much            Comments   UVEITIC GLAUCOMA  SARCOID UVEITIS  H/O NON COMPLIANCE WITH TX  S/p cyclo cryo os 2014? Pt unsure of dates   S/o Tube shunt os 2014     We had  stopped Pred after last visit and has had no pain since then     Latanoprost qam OU   COSOPT BID OU  ATROPINE BID OU       Last edited by Ulisses Omer MD on 7/10/2018  9:59 AM. (History)            Assessment /Plan     For exam results, see Encounter Report.      ICD-10-CM ICD-9-CM    1. Uveitic glaucoma of both eyes, severe stage H40.43X3 365.62 IOP not within acceptable range relative to target IOP with risk of irreversible visual loss. Additional treatment required.  Discussed options, risks, and benefits of additional medication, SLT laser, or incisional glaucoma surgery.     Normal BSCAN with Dr Buckner 1/2018     recommend G-6 laser Left eye   Will use Keto and Pred QID OS - will start  1 day before sx     Patient chooses G-6 OS    SUBHASH with pt and family that this laser will not restore or improve vision     Reviewed importance of continued compliance with treatment and follow up.       H20.9 365.73    2. 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 Sarcoidosis D86.9 135    4. Legal blindness, as defined in [place] H54.8 369.4        Latanoprost qam OU   COSOPT BID OU  ATROPINE BID OU

## 2018-07-23 ENCOUNTER — LAB VISIT (OUTPATIENT)
Dept: LAB | Facility: HOSPITAL | Age: 48
End: 2018-07-23
Attending: INTERNAL MEDICINE
Payer: MEDICARE

## 2018-07-23 DIAGNOSIS — R60.0 EDEMA OF LEG: ICD-10-CM

## 2018-07-23 PROCEDURE — 36415 COLL VENOUS BLD VENIPUNCTURE: CPT

## 2018-07-27 ENCOUNTER — OFFICE VISIT (OUTPATIENT)
Dept: OPHTHALMOLOGY | Facility: CLINIC | Age: 48
End: 2018-07-27
Payer: MEDICARE

## 2018-07-27 DIAGNOSIS — H54.8 LEGAL BLINDNESS: ICD-10-CM

## 2018-07-27 DIAGNOSIS — Z98.890 POST-OPERATIVE STATE: Primary | ICD-10-CM

## 2018-07-27 DIAGNOSIS — H40.43X3 UVEITIC GLAUCOMA OF BOTH EYES, SEVERE STAGE: ICD-10-CM

## 2018-07-27 DIAGNOSIS — H20.9 UVEITIC GLAUCOMA OF BOTH EYES, SEVERE STAGE: ICD-10-CM

## 2018-07-27 PROCEDURE — 99211 OFF/OP EST MAY X REQ PHY/QHP: CPT | Mod: PBBFAC,PO | Performed by: OPHTHALMOLOGY

## 2018-07-27 PROCEDURE — 99024 POSTOP FOLLOW-UP VISIT: CPT | Mod: POP,,, | Performed by: OPHTHALMOLOGY

## 2018-07-27 PROCEDURE — 99999 PR PBB SHADOW E&M-EST. PATIENT-LVL I: CPT | Mod: PBBFAC,,, | Performed by: OPHTHALMOLOGY

## 2018-07-27 NOTE — PROGRESS NOTES
HPI     Post-op Evaluation    Additional comments: 1 day s/p G6 OS            Comments   Patient c/o itching and soreness of the left eye.    UVEITIC GLAUCOMA  SARCOID UVEITIS  H/O NON COMPLIANCE WITH TX  S/p cyclo cryo os 2014? Pt unsure of dates   S/o Tube shunt os 2014   G6 OS 7/26/2018    Latanoprost qam OU   COSOPT BID OU  ATROPINE BID OU    OS: ketorolac qid, pred qid       Last edited by Any Crabtree, PCT on 7/27/2018 10:04 AM. (History)              Assessment /Plan     For exam results, see Encounter Report.      ICD-10-CM ICD-9-CM    1. Post-operative state Z98.890 V45.89    2. Uveitic glaucoma of both eyes, severe stage H40.43X3 365.62     H20.9 365.73    3. 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

## 2018-07-31 ENCOUNTER — OFFICE VISIT (OUTPATIENT)
Dept: PULMONOLOGY | Facility: CLINIC | Age: 48
End: 2018-07-31
Payer: MEDICARE

## 2018-07-31 VITALS
SYSTOLIC BLOOD PRESSURE: 144 MMHG | WEIGHT: 267.5 LBS | RESPIRATION RATE: 18 BRPM | BODY MASS INDEX: 47.4 KG/M2 | OXYGEN SATURATION: 96 % | HEART RATE: 87 BPM | HEIGHT: 63 IN | DIASTOLIC BLOOD PRESSURE: 84 MMHG

## 2018-07-31 DIAGNOSIS — H40.43X3 UVEITIC GLAUCOMA OF BOTH EYES, SEVERE STAGE: Chronic | ICD-10-CM

## 2018-07-31 DIAGNOSIS — E66.01 MORBID OBESITY WITH BODY MASS INDEX (BMI) OF 40.0 TO 49.9: ICD-10-CM

## 2018-07-31 DIAGNOSIS — H20.9 UVEITIC GLAUCOMA OF BOTH EYES, SEVERE STAGE: Chronic | ICD-10-CM

## 2018-07-31 DIAGNOSIS — R60.0 EDEMA OF LEG: ICD-10-CM

## 2018-07-31 DIAGNOSIS — D86.9 SARCOIDOSIS: ICD-10-CM

## 2018-07-31 DIAGNOSIS — J45.40 MODERATE PERSISTENT ASTHMA WITHOUT COMPLICATION: Chronic | ICD-10-CM

## 2018-07-31 DIAGNOSIS — G47.33 OSA ON CPAP: Primary | ICD-10-CM

## 2018-07-31 DIAGNOSIS — G47.34 NOCTURNAL HYPOXEMIA: ICD-10-CM

## 2018-07-31 PROCEDURE — 99214 OFFICE O/P EST MOD 30 MIN: CPT | Mod: PBBFAC | Performed by: INTERNAL MEDICINE

## 2018-07-31 PROCEDURE — 99214 OFFICE O/P EST MOD 30 MIN: CPT | Mod: S$PBB,,, | Performed by: INTERNAL MEDICINE

## 2018-07-31 PROCEDURE — 99999 PR PBB SHADOW E&M-EST. PATIENT-LVL IV: CPT | Mod: PBBFAC,,, | Performed by: INTERNAL MEDICINE

## 2018-07-31 NOTE — PROGRESS NOTES
Subjective:       Patient ID: Jennifer Mcclain is a 48 y.o. female.    Chief Complaint: Asthma (rev tests) and Sleep Apnea    MsLee Mcclain is 48  years old  She has advanced oculus sarcoid with blindness.  Followed up for uveitis by Ophthalmology: Dr Omer, recent eye surgery  Asthma score was 14.  [place] sleepiness score was 7.  Most of her symptoms are related to shortness of breath with activity.    She has noted some lower extremity edema.Improved with Lasix.  I reviewed her spirometry shows mixed defect.  Her compliance with CPAP had been poor.  encorqged  patient to improve    Her inhalers   Breo Ellipta    Immunizations are up-to-date  Chest x-ray was clear  I have reviewed the patient's medical history in detail and updated the computerized patient record.        Asthma   There is no shortness of breath, sputum production or wheezing. Pertinent negatives include no postnasal drip. Her past medical history is significant for asthma.     Review of Systems   Constitutional: Negative.    HENT: Negative for postnasal drip.    Eyes: Negative.    Respiratory: Negative.  Negative for snoring, sputum production, shortness of breath, wheezing and dyspnea on extertion.    Cardiovascular: Positive for leg swelling.   Genitourinary: Negative.    Endocrine: endocrine negative   Musculoskeletal: Negative.    Skin: Negative.    Gastrointestinal: Negative.    Neurological: Negative.    Psychiatric/Behavioral: Negative.         Answers for HPI/ROS submitted by the patient on 7/31/2018   Asthma  In the past 4 weeks, how much of the time did your asthma keep you from getting as much done at work, school, or at home?: some of the time  During the past 4 weeks, how often have you had shortness of breath?: more than once a day  During the past 4 weeks, how often did your asthma symptoms (Wheezing, coughing, shortness of breath, chest tightness or pain) wake you up at night or earlier that usual in the morning?: not at  "all  During the past 4 weeks, how often have you used your rescue inhaler or nebulizer medication (such as albuterol)?: 1 or 2 times per day  How would you rate your asthma control during the past 4 weeks?: somewhat controlled   : 14      Objective:       Vitals:    18 0813   BP: (!) 144/84   Pulse: 87   Resp: 18   SpO2: 96%   Weight: 121.3 kg (267 lb 8.5 oz)   Height: 5' 3" (1.6 m)     Physical Exam   Constitutional: She is oriented to person, place, and time. Vital signs are normal. She appears well-developed and well-nourished.     She is obese.   HENT:   Head: Normocephalic.   Nose: Nose normal.   Neck: Normal range of motion. Neck supple.   Cardiovascular: Normal rate, regular rhythm and normal heart sounds.    Pulmonary/Chest: Normal expansion, symmetric chest wall expansion, effort normal and breath sounds normal.   Abdominal: Soft.   Musculoskeletal: Normal range of motion. She exhibits edema.   Lymphadenopathy:     She has no cervical adenopathy.   Neurological: She is alert and oriented to person, place, and time. She has normal reflexes.   Skin: Skin is warm and dry. Nails show no clubbing.   Psychiatric: She has a normal mood and affect.   Nursing note and vitals reviewed.    Personal Diagnostic Review  Chest x-ray:   Findings: The lungs are clear and free of infiltrate.  No pleural effusion or pneumothorax is identified.  The heart is enlarged.    Pulmonary function tests: FEV1: 1.12  (50.4 % predicted), FVC:  1.56 (56.7 % predicted), FEV1/FVC:  72    Overnight sat:  REPORT SUMMARY  Total valid samplin:31:28   High SpO2: 99%    Low SpO2: 75%    Mean SpO2  94.0 %  Cumulative time with oxygen saturation less than 88% (TC88):   00:19:36    CLINICAL INTERPRETATION   There is  significant nocturnal oxygen desaturation and    Clinical correlation is advised    Medicare Criteria Comments:   Oximetry test results suggest the patient falls under Medicare   Group 1 Criteria. ( Arterial oxygen " "saturation at or below 88%   for at least 5 minutes taken during sleep)    6MW Test  Height: 5' 3" (160 cm)  Weight: 121.3 kg (267 lb 8.5 oz)  BMI (Calculated): 47.5  Interpretation  Predicted Distance Meters (Calculated): 437.71 meters  LLN was 300.8m  Distance walked: 152.4 meters  No exercise desaturation    No flowsheet data found.      Assessment:       Problem List Items Addressed This Visit     Moderate persistent asthma without complication (Chronic)     ACT score 14  On BREO and CHEYANNE, DEON  FEV1 1.12L ( 50.4%)  FEV1/FVC 72  immunisations current         Uveitic glaucoma of both eyes (Chronic)     Legally blind  SP surgery         Sarcoidosis     Last ACE was normal  Advance ocular disease  Will get Gallium or PET CT         Relevant Orders    NM Inflammitory Localization WB Gallium    GEN on CPAP - Primary     [place] score 7  Has not used CPAP since Wed last week, Eye surgery.  Using PAP and benefits         Morbid obesity with body mass index (BMI) of 40.0 to 49.9     General weight loss/lifestyle modification strategies discussed (elicit support from others; identify saboteurs; non-food rewards).  Diet interventions: low calorie (1000 kCal/d) deficit diet           Nocturnal hypoxemia     TC 88 on CPAP 10 cm was 00:19:36  Oxygen was            Other Visit Diagnoses     Edema of leg        on Lasix        Plan:       Improve CPAP adherence.  Had BREO and nebulizer  Additional Lasix for leg edema.  Gallium scan    Follow-up in about 3 months (around 10/31/2018) for Gallium, Download, CPAP supplies, Weight loss and exercise.    This note was prepared using voice recognition system and is likely to have sound alike errors that may have been overlooked even after proof reading.  Please call me with any questions    Discussed diagnosis, its evaluation, treatment and usual course. All questions answered.    Thank you for the courtesy of participating in the care of this patient    Mike Mota MD      "

## 2018-08-01 ENCOUNTER — TELEPHONE (OUTPATIENT)
Dept: PULMONOLOGY | Facility: CLINIC | Age: 48
End: 2018-08-01

## 2018-08-01 NOTE — TELEPHONE ENCOUNTER
"----- Message from Mike Mota MD sent at 7/31/2018  1:58 PM CDT -----  Regarding: RE: Cancellation of Order # [id number]  Needs to be redrawn    Mike Mota MD    ----- Message -----  From: [employer] Interface  Sent: 7/31/2018   1:17 PM  To: Mike Mota MD  Subject: Cancellation of Order # [id number]                Order number [id number] for the procedure BASIC METABOLIC PANEL   [LAB15] has been canceled by [facility] [[id number]].   This procedure was ordered by Mike Mota MD [[id number]] on   Jul 23, 2018 for the patient Jennifer Mcclain [[id number]]. The   reason for cancellation was "Other".    "

## 2018-08-05 RX ORDER — PREDNISONE 10 MG/1
TABLET ORAL
Qty: 32 TABLET | Refills: 0 | OUTPATIENT
Start: 2018-08-05 | End: 2019-08-05

## 2018-08-13 ENCOUNTER — HOSPITAL ENCOUNTER (OUTPATIENT)
Dept: RADIOLOGY | Facility: HOSPITAL | Age: 48
Discharge: HOME | End: 2018-08-13
Attending: INTERNAL MEDICINE
Payer: MEDICARE

## 2018-08-13 DIAGNOSIS — D86.9 SARCOIDOSIS: ICD-10-CM

## 2018-08-13 PROCEDURE — A9556 GA67 GALLIUM: HCPCS

## 2018-08-14 ENCOUNTER — HOSPITAL ENCOUNTER (OUTPATIENT)
Dept: RADIOLOGY | Facility: HOSPITAL | Age: 48
Discharge: HOME | End: 2018-08-14
Attending: INTERNAL MEDICINE
Payer: MEDICARE

## 2018-08-15 ENCOUNTER — HOSPITAL ENCOUNTER (OUTPATIENT)
Dept: RADIOLOGY | Facility: HOSPITAL | Age: 48
Discharge: HOME | End: 2018-08-15
Attending: INTERNAL MEDICINE
Payer: MEDICARE

## 2018-08-24 ENCOUNTER — OFFICE VISIT (OUTPATIENT)
Dept: OPHTHALMOLOGY | Facility: CLINIC | Age: 48
End: 2018-08-24
Payer: MEDICARE

## 2018-08-24 DIAGNOSIS — H40.43X3 UVEITIC GLAUCOMA OF BOTH EYES, SEVERE STAGE: ICD-10-CM

## 2018-08-24 DIAGNOSIS — H20.9 UVEITIC GLAUCOMA OF BOTH EYES, SEVERE STAGE: ICD-10-CM

## 2018-08-24 DIAGNOSIS — Z98.890 POST-OPERATIVE STATE: Primary | ICD-10-CM

## 2018-08-24 DIAGNOSIS — H54.8 LEGAL BLINDNESS: ICD-10-CM

## 2018-08-24 PROCEDURE — 99024 POSTOP FOLLOW-UP VISIT: CPT | Mod: POP,,, | Performed by: OPHTHALMOLOGY

## 2018-08-24 PROCEDURE — 99999 PR PBB SHADOW E&M-EST. PATIENT-LVL II: CPT | Mod: PBBFAC,,, | Performed by: OPHTHALMOLOGY

## 2018-08-24 PROCEDURE — 99212 OFFICE O/P EST SF 10 MIN: CPT | Mod: PBBFAC,PO | Performed by: OPHTHALMOLOGY

## 2018-08-24 NOTE — PROGRESS NOTES
HPI     Post-op Evaluation      Additional comments: 3 week RTC Pred QID OS, Keto QID OS, OU Latanoprost   QHS, Cosopt BID, Atropine BID              Comments     Pt states no problems since her last visit. Using her drops as directed.   No pain or irritation today.     UVEITIC GLAUCOMA  SARCOID UVEITIS  H/O NON COMPLIANCE WITH TX  S/p cyclo cryo os 2014? Pt unsure of dates   S/o Tube shunt os 2014   G6 OS 7/26/2018    Latanoprost qam OU   COSOPT BID OU  ATROPINE BID OU    OS: ketorolac qid, pred qid          Last edited by Rajesh Payne on 8/24/2018 10:44 AM. (History)            Assessment /Plan     For exam results, see Encounter Report.      ICD-10-CM ICD-9-CM     1. Post-operative state Z98.890 V45.89 S/p G-6 laser to the left eye , excellent response was 62 pres laser and  now 24 today      2. Uveitic glaucoma of both eyes, severe stage H40.43X3 365.62 IOP not within acceptable range OD  relative to target IOP with risk of irreversible visual loss. Laser would improve IOP and decrease the risk of cornea decompensation     recommend G-6     Patient chooses g-6 right eye     Reviewed importance of continued compliance with treatment and follow up.     Pt understands this would not improve vision       H20.9 365.73    3. 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

## 2018-08-29 ENCOUNTER — HOSPITAL ENCOUNTER (EMERGENCY)
Facility: HOSPITAL | Age: 48
Discharge: HOME | End: 2018-08-29
Attending: EMERGENCY MEDICINE
Payer: MEDICARE

## 2018-08-29 VITALS
HEART RATE: 92 BPM | HEIGHT: 64 IN | WEIGHT: 265.13 LBS | DIASTOLIC BLOOD PRESSURE: 71 MMHG | BODY MASS INDEX: 45.26 KG/M2 | OXYGEN SATURATION: 98 % | TEMPERATURE: 99 F | RESPIRATION RATE: 18 BRPM | SYSTOLIC BLOOD PRESSURE: 137 MMHG

## 2018-08-29 DIAGNOSIS — R07.9 CHEST PAIN: ICD-10-CM

## 2018-08-29 DIAGNOSIS — R05.9 COUGH: ICD-10-CM

## 2018-08-29 DIAGNOSIS — B34.9 ACUTE VIRAL SYNDROME: Primary | ICD-10-CM

## 2018-08-29 LAB
ANION GAP SERPL CALC-SCNC: 9 MMOL/L
BASOPHILS # BLD AUTO: 0.02 K/UL
BASOPHILS NFR BLD: 0.3 %
BUN SERPL-MCNC: 8 MG/DL
CALCIUM SERPL-MCNC: 9.6 MG/DL
CHLORIDE SERPL-SCNC: 104 MMOL/L
CO2 SERPL-SCNC: 25 MMOL/L
CREAT SERPL-MCNC: 0.8 MG/DL
DIFFERENTIAL METHOD: ABNORMAL
EOSINOPHIL # BLD AUTO: 0.3 K/UL
EOSINOPHIL NFR BLD: 4.9 %
ERYTHROCYTE [DISTWIDTH] IN BLOOD BY AUTOMATED COUNT: 14.6 %
EST. GFR  (AFRICAN AMERICAN): >60 ML/MIN/1.73 M^2
EST. GFR  (NON AFRICAN AMERICAN): >60 ML/MIN/1.73 M^2
FLUAV AG SPEC QL IA: NEGATIVE
FLUBV AG SPEC QL IA: NEGATIVE
GLUCOSE SERPL-MCNC: 93 MG/DL
HCT VFR BLD AUTO: 34.8 %
HGB BLD-MCNC: 11.4 G/DL
LYMPHOCYTES # BLD AUTO: 1.5 K/UL
LYMPHOCYTES NFR BLD: 22 %
MCH RBC QN AUTO: 28 PG
MCHC RBC AUTO-ENTMCNC: 32.8 G/DL
MCV RBC AUTO: 86 FL
MONOCYTES # BLD AUTO: 0.8 K/UL
MONOCYTES NFR BLD: 12 %
NEUTROPHILS # BLD AUTO: 4.2 K/UL
NEUTROPHILS NFR BLD: 60.8 %
PLATELET # BLD AUTO: 213 K/UL
PMV BLD AUTO: 11.5 FL
POTASSIUM SERPL-SCNC: 4.3 MMOL/L
RBC # BLD AUTO: 4.07 M/UL
SODIUM SERPL-SCNC: 138 MMOL/L
SPECIMEN SOURCE: NORMAL
WBC # BLD AUTO: 6.92 K/UL

## 2018-08-29 PROCEDURE — 87400 INFLUENZA A/B EACH AG IA: CPT

## 2018-08-29 PROCEDURE — 25000003 PHARM REV CODE 250: Performed by: EMERGENCY MEDICINE

## 2018-08-29 PROCEDURE — 93005 ELECTROCARDIOGRAM TRACING: CPT

## 2018-08-29 PROCEDURE — 85025 COMPLETE CBC W/AUTO DIFF WBC: CPT

## 2018-08-29 PROCEDURE — 99284 EMERGENCY DEPT VISIT MOD MDM: CPT | Mod: 25

## 2018-08-29 PROCEDURE — 93010 ELECTROCARDIOGRAM REPORT: CPT | Mod: ,,, | Performed by: INTERNAL MEDICINE

## 2018-08-29 PROCEDURE — 80048 BASIC METABOLIC PNL TOTAL CA: CPT

## 2018-08-29 RX ORDER — IBUPROFEN 800 MG/1
800 TABLET ORAL
Status: COMPLETED | OUTPATIENT
Start: 2018-08-29 | End: 2018-08-29

## 2018-08-29 RX ORDER — KETOROLAC TROMETHAMINE 30 MG/ML
15 INJECTION, SOLUTION INTRAMUSCULAR; INTRAVENOUS
Status: DISCONTINUED | OUTPATIENT
Start: 2018-08-29 | End: 2018-08-29 | Stop reason: HOSPADM

## 2018-08-29 RX ORDER — HYDROCODONE BITARTRATE AND ACETAMINOPHEN 5; 325 MG/1; MG/1
1 TABLET ORAL EVERY 4 HOURS PRN
Qty: 18 TABLET | Refills: 0 | Status: SHIPPED | OUTPATIENT
Start: 2018-08-29 | End: 2021-07-26

## 2018-08-29 RX ORDER — HYDROCODONE BITARTRATE AND ACETAMINOPHEN 5; 325 MG/1; MG/1
1 TABLET ORAL
Status: COMPLETED | OUTPATIENT
Start: 2018-08-29 | End: 2018-08-29

## 2018-08-29 RX ORDER — ONDANSETRON 4 MG/1
4 TABLET, ORALLY DISINTEGRATING ORAL
Status: COMPLETED | OUTPATIENT
Start: 2018-08-29 | End: 2018-08-29

## 2018-08-29 RX ORDER — MORPHINE SULFATE 4 MG/ML
4 INJECTION, SOLUTION INTRAMUSCULAR; INTRAVENOUS
Status: DISCONTINUED | OUTPATIENT
Start: 2018-08-29 | End: 2018-08-29 | Stop reason: HOSPADM

## 2018-08-29 RX ORDER — ONDANSETRON 2 MG/ML
4 INJECTION INTRAMUSCULAR; INTRAVENOUS
Status: DISCONTINUED | OUTPATIENT
Start: 2018-08-29 | End: 2018-08-29 | Stop reason: HOSPADM

## 2018-08-29 RX ORDER — OXYMETAZOLINE HCL 0.05 %
1 SPRAY, NON-AEROSOL (ML) NASAL 2 TIMES DAILY
Qty: 15 ML | Refills: 0 | COMMUNITY
Start: 2018-08-29 | End: 2018-09-01

## 2018-08-29 RX ORDER — ONDANSETRON 4 MG/1
4 TABLET, FILM COATED ORAL EVERY 6 HOURS
Qty: 12 TABLET | Refills: 0 | Status: SHIPPED | OUTPATIENT
Start: 2018-08-29 | End: 2019-01-07

## 2018-08-29 RX ADMIN — IBUPROFEN 800 MG: 800 TABLET ORAL at 12:08

## 2018-08-29 RX ADMIN — HYDROCODONE BITARTRATE AND ACETAMINOPHEN 1 TABLET: 5; 325 TABLET ORAL at 12:08

## 2018-08-29 RX ADMIN — ONDANSETRON 4 MG: 4 TABLET, ORALLY DISINTEGRATING ORAL at 12:08

## 2018-08-29 NOTE — ED PROVIDER NOTES
"SCRIBE #1 NOTE: I, Barbie Sotomayor, am scribing for, and in the presence of, No att. providers found. I have scribed the entire note.      History      Chief Complaint   Patient presents with    Generalized Body Aches     Pt states chest pain, body aches, chills for several days. -n/v/d/fever.        Review of patient's allergies indicates:  No Known Allergies     HPI   HPI    8/29/2018, 11:20 AM   History obtained from the {adult relatives:23758::"patient"}      History of Present Illness: Jennifer Mcclain is a 48 y.o. female patient who presents to the Emergency Department for *** which onset gradually ***. Symptoms are *** and *** in severity. No mitigating or exacerbating factors reported.*** Associated sxs include ***. Patient denies ***, and all other sxs at this time. No further complaints or concerns at this time.       Arrival mode: Personal vehicle***    PCP: Primary Doctor No       Past Medical History:  Past Medical History:   Diagnosis Date    Asthma     Blindness     Glaucoma     Psoriasis     knees and ankles    Sarcoidosis        Past Surgical History:  Past Surgical History:   Procedure Laterality Date    Laser SX Right          Family History:  Family History   Problem Relation Age of Onset    Heart disease Father     Hypertension Maternal Grandmother        Social History:  Social History     Tobacco Use    Smoking status: Never Smoker    Smokeless tobacco: Never Used   Substance and Sexual Activity    Alcohol use: No    Drug use: No    Sexual activity: Not on file       ROS   Review of Systems    Physical Exam      Initial Vitals [08/29/18 1056]   BP Pulse Resp Temp SpO2   (!) 165/72 104 16 99 °F (37.2 °C) 96 %      MAP       --          Physical Exam  Nursing Notes and Vital Signs Reviewed.***  Constitutional: Patient is in {DISTRESS LEVEL:79325}. Awake and alert. Well-developed and well-nourished.  Head: Atraumatic. Normocephalic.  Eyes: PERRL. EOM intact. Conjunctivae are not pale. No " "scleral icterus.  ENT: Mucous membranes are moist. Oropharynx is clear and symmetric.    Neck: Supple. Full ROM. No lymphadenopathy.  Cardiovascular: Regular rate. Regular rhythm. No murmurs, rubs, or gallops. Distal pulses are 2+ and symmetric.  Pulmonary/Chest: No respiratory distress. Clear to auscultation bilaterally. No wheezing, rales, or rhonchi.  Abdominal: Soft and non-distended.  There is no tenderness.  No rebound, guarding, or rigidity.  Good bowel sounds.    :***  Musculoskeletal: Moves all extremities. No obvious deformities. No edema. No calf tenderness.  Skin: Warm and dry.  Neurological:  Alert, awake, and appropriate.  Normal speech.  No acute focal neurological deficits are appreciated.  Psychiatric: Normal affect. Good eye contact. Appropriate in content.    ED Course    Procedures  ED Vital Signs:  Vitals:    08/29/18 1056   BP: (!) 165/72   Pulse: 104   Resp: 16   Temp: 99 °F (37.2 °C)   TempSrc: Oral   SpO2: 96%   Weight: 120.2 kg (265 lb 1.7 oz)   Height: 5' 4" (1.626 m)       Abnormal Lab Results:  Labs Reviewed - No data to display     All Lab Results:  ***    Imaging Results:  Imaging Results    None          The Emergency Provider reviewed the vital signs and test results, which are outlined above.    ED Discussion     ***:   ED Medication(s):  Medications - No data to display    New Prescriptions    No medications on file            Medical Decision Making              Scribe Attestation:   Scribe #1: I performed the above scribed service and the documentation accurately describes the services I performed. I attest to the accuracy of the note.    Attending:   Physician Attestation Statement for Scribe #1: I, No att. providers found, personally performed the services described in this documentation, as scribed by Barbie Sotomayor, in my presence, and it is both accurate and complete.          Clinical Impression       ICD-10-CM ICD-9-CM   1. Chest pain R07.9 786.50            "

## 2018-08-29 NOTE — ED NOTES
Attempted second IV attempt at this time unable to gain access. Dr Coelho and primary nurse made aware at this time.

## 2018-08-29 NOTE — ED PROVIDER NOTES
SCRIBE #1 NOTE: I, Salas Garcia, am scribing for, and in the presence of, Lexie Wilkerson Do, MD. I have scribed the entire note.      History      Chief Complaint   Patient presents with    Generalized Body Aches     Pt states chest pain, body aches, chills for several days. -n/v/d/fever.        Review of patient's allergies indicates:  No Known Allergies     HPI   HPI    8/29/2018, 11:25 AM   History obtained from the patient      History of Present Illness: Jennifer Mcclain is a 48 y.o. female patient who presents to the Emergency Department for an evaluation of generalized myalgia which onset gradually x3 days ago. Symptoms are constant and moderate in severity. Exacerbated by nothing and relieved by nothing. Associated sxs include SOB, leg swelling, chills, congestion, and chest discomfort. Patient denies any fever, fatigue, diaphoresis, abd pain, N/V/D, dysuria, hematuria, HA, weakness/numbness, sore throat, and all other sxs at this time. Pt denies relief from Nyquil or robitussin. No further complaints or concerns at this time.       Arrival mode: Personal vehicle    PCP: Primary Doctor No       Past Medical History:  Past Medical History:   Diagnosis Date    Asthma     Blindness     Glaucoma     Psoriasis     knees and ankles    Sarcoidosis        Past Surgical History:  Past Surgical History:   Procedure Laterality Date    Laser SX Right          Family History:  Family History   Problem Relation Age of Onset    Heart disease Father     Hypertension Maternal Grandmother        Social History:  Social History     Tobacco Use    Smoking status: Never Smoker    Smokeless tobacco: Never Used   Substance and Sexual Activity    Alcohol use: No    Drug use: No    Sexual activity: Unknown       ROS   Review of Systems   Constitutional: Positive for chills. Negative for appetite change, diaphoresis and fever.   HENT: Positive for congestion. Negative for rhinorrhea, sore throat and trouble swallowing.   "  Respiratory: Positive for shortness of breath. Negative for cough, choking and wheezing.    Cardiovascular: Positive for leg swelling. Negative for chest pain and palpitations.        (+) Chest discomfort   Gastrointestinal: Negative for abdominal pain, diarrhea and vomiting.   Genitourinary: Negative for dysuria, frequency, hematuria and urgency.   Musculoskeletal: Positive for myalgias (generalized). Negative for back pain and neck pain.   Neurological: Negative for dizziness, weakness, light-headedness and headaches.     Physical Exam      Initial Vitals [08/29/18 1056]   BP Pulse Resp Temp SpO2   (!) 165/72 104 16 99 °F (37.2 °C) 96 %      MAP       --          Physical Exam  Nursing Notes and Vital Signs Reviewed.  Constitutional: Patient is in no acute distress. Obese.   Head: Atraumatic. Normocephalic.  Eyes: PERRL. EOM intact. Conjunctivae are not pale. No scleral icterus.  ENT: Mucous membranes are moist. Oropharynx is clear and symmetric.    Neck: Supple. Full ROM. No lymphadenopathy.  Cardiovascular: Regular rate. Regular rhythm.  Pulmonary/Chest: No respiratory distress. Clear to auscultation bilaterally. No wheezing or rales.  Abdominal: Soft and non-distended.  There is no tenderness.   Musculoskeletal: Moves all extremities. No obvious deformities. less than 1+ bilateral lower extremity pitting edema. Eczema noted to bilateral knees. No calf tenderness.   Skin: Warm and dry.  Neurological:  Alert, awake, and appropriate.  Normal speech.  No acute focal neurological deficits are appreciated.  Psychiatric: Normal affect. Good eye contact. Appropriate in content.    ED Course    Procedures  ED Vital Signs:  Vitals:    08/29/18 1056 08/29/18 1345   BP: (!) 165/72 137/71   Pulse: 104 92   Resp: 16 18   Temp: 99 °F (37.2 °C) 98.6 °F (37 °C)   TempSrc: Oral Oral   SpO2: 96% 98%   Weight: 120.2 kg (265 lb 1.7 oz)    Height: 5' 4" (1.626 m)        Abnormal Lab Results:  Labs Reviewed   CBC W/ AUTO " DIFFERENTIAL - Abnormal; Notable for the following components:       Result Value    Hemoglobin 11.4 (*)     Hematocrit 34.8 (*)     RDW 14.6 (*)     All other components within normal limits   BASIC METABOLIC PANEL   INFLUENZA A AND B ANTIGEN   URINALYSIS        All Lab Results:  Results for orders placed or performed during the hospital encounter of 08/29/18   CBC auto differential   Result Value Ref Range    WBC 6.92 3.90 - 12.70 K/uL    RBC 4.07 4.00 - 5.40 M/uL    Hemoglobin 11.4 (L) 12.0 - 16.0 g/dL    Hematocrit 34.8 (L) 37.0 - 48.5 %    MCV 86 82 - 98 fL    MCH 28.0 27.0 - 31.0 pg    MCHC 32.8 32.0 - 36.0 g/dL    RDW 14.6 (H) 11.5 - 14.5 %    Platelets 213 150 - 350 K/uL    MPV 11.5 9.2 - 12.9 fL    Gran # (ANC) 4.2 1.8 - 7.7 K/uL    Lymph # 1.5 1.0 - 4.8 K/uL    Mono # 0.8 0.3 - 1.0 K/uL    Eos # 0.3 0.0 - 0.5 K/uL    Baso # 0.02 0.00 - 0.20 K/uL    Gran% 60.8 38.0 - 73.0 %    Lymph% 22.0 18.0 - 48.0 %    Mono% 12.0 4.0 - 15.0 %    Eosinophil% 4.9 0.0 - 8.0 %    Basophil% 0.3 0.0 - 1.9 %    Differential Method Automated    Basic metabolic panel   Result Value Ref Range    Sodium 138 136 - 145 mmol/L    Potassium 4.3 3.5 - 5.1 mmol/L    Chloride 104 95 - 110 mmol/L    CO2 25 23 - 29 mmol/L    Glucose 93 70 - 110 mg/dL    BUN, Bld 8 6 - 20 mg/dL    Creatinine 0.8 0.5 - 1.4 mg/dL    Calcium 9.6 8.7 - 10.5 mg/dL    Anion Gap 9 8 - 16 mmol/L    eGFR if African American >60 >60 mL/min/1.73 m^2    eGFR if non African American >60 >60 mL/min/1.73 m^2   Influenza antigen Nasopharyngeal Swab   Result Value Ref Range    Influenza A Ag, EIA Negative Negative    Influenza B Ag, EIA Negative Negative    Flu A & B Source Nasopharyngeal Swab          Imaging Results:  Imaging Results          X-Ray Chest PA And Lateral (Final result)  Result time 08/29/18 12:38:23    Final result by Josh Patterson MD (08/29/18 12:38:23)                 Impression:      Stable chest x-ray.  No acute findings.      Electronically  signed by: Josh Patterson MD  Date:    08/29/2018  Time:    12:38             Narrative:    EXAMINATION:  XR CHEST PA AND LATERAL    CLINICAL HISTORY  Cough and congestion,    COMPARISON:  None    FINDINGS:  The heart size is mildly prominent.  The lung fields are clear.  No acute cardiopulmonary infiltrative.                                      The EKG was ordered, reviewed, and independently interpreted by the ED provider.  Interpretation time: 11:04  Rate: 103 BPM  Rhythm: sinus tachycardia  Interpretation: Normal axis. No STEMI.  When compared to EKG performed 2/12/18, there are no significant changes.      The Emergency Provider reviewed the vital signs and test results, which are outlined above.    ED Discussion     1:09 PM: Reassessed pt at this time. Pt is awake, alert, and in NAD. Pt states her condition has improved at this time. Discussed with pt all pertinent ED information and results. Discussed pt dx and plan of tx. Gave pt all f/u and return to the ED instructions. All questions and concerns were addressed at this time. Pt expresses understanding of information and instructions, and is comfortable with plan to discharge. Pt is stable for discharge.    I discussed with patient and/or family/caretaker that evaluation in the ED does not suggest any emergent or life threatening medical conditions requiring immediate intervention beyond what was provided in the ED, and I believe patient is safe for discharge.  Regardless, an unremarkable evaluation in the ED does not preclude the development or presence of a serious of life threatening condition. As such, patient was instructed to return immediately for any worsening or change in current symptoms.      ED Medication(s):  Medications   sodium chloride 0.9% bolus 1,000 mL (1,000 mLs Intravenous Not Given 8/29/18 1215)   ketorolac injection 15 mg (15 mg Intravenous Not Given 8/29/18 1215)   ondansetron injection 4 mg (4 mg Intravenous Not Given 8/29/18  1215)   morphine injection 4 mg (4 mg Intravenous Not Given 8/29/18 1215)   HYDROcodone-acetaminophen 5-325 mg per tablet 1 tablet (1 tablet Oral Given 8/29/18 1246)   ibuprofen tablet 800 mg (800 mg Oral Given 8/29/18 1247)   ondansetron disintegrating tablet 4 mg (4 mg Oral Given 8/29/18 1247)       Discharge Medication List as of 8/29/2018  1:25 PM      START taking these medications    Details   HYDROcodone-acetaminophen (NORCO) 5-325 mg per tablet Take 1 tablet by mouth every 4 (four) hours as needed for Pain., Starting Wed 8/29/2018, Print      ondansetron (ZOFRAN) 4 MG tablet Take 1 tablet (4 mg total) by mouth every 6 (six) hours., Starting Wed 8/29/2018, Normal      oxymetazoline (AFRIN) 0.05 % nasal spray 1 spray by Nasal route 2 (two) times daily. for 3 days, Starting Wed 8/29/2018, Until Sat 9/1/2018, OTC             Follow-up Information     [facility] In 2 days.                   Medical Decision Making    Medical Decision Making:   Clinical Tests:   Lab Tests: Ordered and Reviewed  Radiological Study: Ordered and Reviewed  Medical Tests: Ordered and Reviewed           Scribe Attestation:   Scribe #1: I performed the above scribed service and the documentation accurately describes the services I performed. I attest to the accuracy of the note.    Attending:   Physician Attestation Statement for Scribe #1: I, Lexie Wilkerson Do, MD, personally performed the services described in this documentation, as scribed by Salas Garcia, in my presence, and it is both accurate and complete.          Clinical Impression       ICD-10-CM ICD-9-CM   1. Acute viral syndrome B34.9 079.99   2. Chest pain R07.9 786.50   3. Cough R05 786.2       Disposition:   Disposition: Discharged  Condition: Stable         Lexie Wilkerson Do, MD  08/29/18 9883

## 2018-09-04 ENCOUNTER — TELEPHONE (OUTPATIENT)
Dept: OPHTHALMOLOGY | Facility: CLINIC | Age: 48
End: 2018-09-04

## 2018-09-04 NOTE — TELEPHONE ENCOUNTER
Barbie spoke with pt about procedure.  She is to report to [facility] on 09/06/18 at 6:30am.

## 2018-09-04 NOTE — TELEPHONE ENCOUNTER
----- Message from Jazmine Francois sent at 9/4/2018 10:38 AM CDT -----  Contact: pt  She's calling in regards to a scheduled procedure, pls advise, [phone]

## 2018-09-05 RX ORDER — PREDNISONE 10 MG/1
TABLET ORAL
Qty: 32 TABLET | Refills: 0 | Status: CANCELLED | OUTPATIENT
Start: 2018-09-05 | End: 2019-09-05

## 2018-09-06 ENCOUNTER — OUTSIDE PLACE OF SERVICE (OUTPATIENT)
Dept: ADMINISTRATIVE | Facility: OTHER | Age: 48
End: 2018-09-06
Payer: MEDICARE

## 2018-09-07 ENCOUNTER — OFFICE VISIT (OUTPATIENT)
Dept: OPHTHALMOLOGY | Facility: CLINIC | Age: 48
End: 2018-09-07
Payer: MEDICARE

## 2018-09-07 DIAGNOSIS — Z98.890 POST-OPERATIVE STATE: Primary | ICD-10-CM

## 2018-09-07 PROCEDURE — 99999 PR PBB SHADOW E&M-EST. PATIENT-LVL II: CPT | Mod: PBBFAC,,, | Performed by: OPHTHALMOLOGY

## 2018-09-07 PROCEDURE — 99024 POSTOP FOLLOW-UP VISIT: CPT | Mod: POP,,, | Performed by: OPHTHALMOLOGY

## 2018-09-07 PROCEDURE — 99212 OFFICE O/P EST SF 10 MIN: CPT | Mod: PBBFAC,PO | Performed by: OPHTHALMOLOGY

## 2018-09-07 NOTE — PROGRESS NOTES
SUBJECTIVE:   Jennifer Mcclain is a 48 y.o. female   Uncorrected distance visual acuity was NLP in the right eye and not recorded in the left eye.   Chief Complaint   Patient presents with    Post-op Evaluation     1 day s/p G6 OD. doing well.         HPI:  HPI     Post-op Evaluation      Additional comments: 1 day s/p G6 OD. doing well.               Comments     UVEITIC GLAUCOMA  SARCOID UVEITIS  H/O NON COMPLIANCE WITH TX  S/p cyclo cryo os 2014? Pt unsure of dates   S/o Tube shunt os 2014   G6 OS 7/26/2018  G6 OD 9/6/2018    Latanoprost qam OU   COSOPT BID OU  ATROPINE BID OU    OS: pred qid  OD: pred qid, ket qid          Last edited by Serina Goss MA on 9/7/2018  9:53 AM. (History)        Assessment /Plan :  1. Post-operative state one day s/p G6 OD doing well  Continue Pred and Ketorolac QID OD  Continue Latanoprost qd OU, Cosopt OU BID, and Atropine BID OU  Continue Pred QID OS   Patient requests to return to clinic in 2 weeks due to transportation.    RTC in one to two weeks with Dr. Omer

## 2018-09-18 ENCOUNTER — TELEPHONE (OUTPATIENT)
Dept: PULMONOLOGY | Facility: CLINIC | Age: 48
End: 2018-09-18

## 2018-09-19 ENCOUNTER — PROCEDURE VISIT (OUTPATIENT)
Dept: PULMONOLOGY | Facility: CLINIC | Age: 48
End: 2018-09-19
Payer: MEDICARE

## 2018-09-19 VITALS
HEART RATE: 84 BPM | HEIGHT: 64 IN | WEIGHT: 265.88 LBS | BODY MASS INDEX: 45.39 KG/M2 | RESPIRATION RATE: 18 BRPM | OXYGEN SATURATION: 96 %

## 2018-09-19 DIAGNOSIS — J45.40 MODERATE PERSISTENT ASTHMA: Primary | ICD-10-CM

## 2018-09-19 PROCEDURE — 94664 DEMO&/EVAL PT USE INHALER: CPT | Mod: PBBFAC | Performed by: GENERAL PRACTICE

## 2018-09-19 NOTE — PROGRESS NOTES
"Pulmonary Disease Management  [facility]  Initial Visit    Referring Provider: Dr Mota  Diagnosis:( Chronic Care Management) Moderate persistent Asthma, GEN, Sarcoidosis  Last Hospital Admission: 8/29/18 (Acute Viral Syndrome)  Last provider visit: 7/31/18      Current Outpatient Medications:     albuterol (VENTOLIN HFA) 90 mcg/actuation inhaler, Inhale 2 puffs into the lungs every 4 (four) hours as needed for Wheezing., Disp: 18 g, Rfl: 11    albuterol-ipratropium 2.5mg-0.5mg/3mL (DUO-NEB) 0.5 mg-3 mg(2.5 mg base)/3 mL nebulizer solution, Take 3 mLs by nebulization every 8 (eight) hours as needed for Wheezing. Rescue, Disp: 360 mL, Rfl: 1    fluticasone-vilanterol (BREO ELLIPTA) 200-25 mcg/dose DsDv diskus inhaler, Inhale 1 puff into the lungs once daily. Controller, Disp: 60 each, Rfl: 11    furosemide (LASIX) 20 MG tablet, Take 1 tablet (20 mg total) by mouth every other day., Disp: 15 tablet, Rfl: 1    Review of patient's allergies indicates:  No Known Allergies    Smoking history:   Social History     Tobacco Use   Smoking Status Never Smoker   Smokeless Tobacco Never Used       Pulse: 84  SpO2: 96 %  Pulse Oximetry Type: Intermittent  Resp: 18  Height: 5' 4" (162.6 cm)  Weight: 120.6 kg (265 lb 14 oz)  BMI (kg/m2): 45.73    All Lung Fields Breath Sounds: wheezes, expiratory  Cough Frequency: frequent  Cough Type: productive  Sputum Amount: scant          Resting Alexei Dyspnea Rating : 0      Current Oxygen Use: (ordered 2l/m with CPAP)       Does the patient use BiPAP, CPAP or Trilogy?: Yes(is not compliant with use)      EPAP (cm H2O): 10   DME company for CPAP unit is located in [place]        ACT Questionnaire:  Asthma Control Test  In the past 4  weeks, how much of the time did your asthma keep you from getting as much done at work, school or at home?: Some of the time  During the past 4 weeks, how often have you had shortness of breath?: Once or twice a week  During the past 4 weeks, how " "often did your asthma symptoms (wheezing, couging, shortness of breath, chest tightness or pain) wake you up at night or earlier than usual in the morning?: 2 or 3 nights a week  During the past 4 weeks, how often have you used your rescue inhaler or nebulizer medication (such as albuterol)?: 2 or 3 times a week  How would you rate your asthma control during the past 4 weeks?: Somewhat controlled  If your score is 19 or less, your asthma may not be under control: 15         Has this patient had any pulmonary studies (PFTS)?: Yes  PFT Results:  Pre FVC   Date/Time Value Ref Range Status   08/29/2017 11:31 AM 1.85 (L) 2.46 - 3.15 L Final     Pre FEV1   Date/Time Value Ref Range Status   08/29/2017 11:31 AM 1.37 (L) 1.96 - 2.55 L Final     Pre FEV1 FVC   Date/Time Value Ref Range Status   08/29/2017 11:31 AM 74 % Final       Is this patient a candidate for pulmonary rehab?: No     Method Used for Education: Literature, Demonstration, Verbal  Did the patient demonstrate understanding?: Yes       Patient Concerns or Expectations: Tired, Fatigue    Therapist Comments:   Reviewed medications purpose and proper technique of Albuterol HFA and Breo Ellipta.   Patient was given and practiced proper technique using the [employer] valved holding chamber. Demonstrated understanding using teach back method.  Good technique following practice.     Patient is compliant with her respiratory regimen and understands the difference between her rescue and controller medications.   Patient uses Albuterol inhaler 2-3 times a week for SOB.   Reminded patient to rinse mouth following Breo use.     Reviewed self monitoring (worsening of) signs and symptoms for Asthma.  Reviewed frequency use of Albuterol HFA.  Patient stated (repeated) understanding.     Reviewed and literature given on "Asthma Triggers".    Patient is not compliant with CPAP use.  Reviewed results of sleep study and the importance of using CPAP therapy. Discussed the " benefits associated with CPAP use including: gain maximum benefit of daytime alertness and functioning. Quality of life improvements.    CPAP use may improve sleep, less anxiety and better overall mood, improve concentration and memory and increase productivity.     Discussed the risk of Sleep Apnea, including increase risk of heart attack, stroke, irregular heartbeat and high blood pressure. Patient stated understanding.      Patient states she was unaware of oxygen 2l/m order to use with CPAP.   Patient was given oxygen adaptor for CPAP machine.  Patient's CPAP unit was received when patient was a resident of [place], she has not changed her insurance to [place].      Plan: (Chronic Care Management)    Reinforce education  Meds: DEON, ICS/CHEYANNE  DME: Hilaria ([facility])- oxygen; CPAP from Company in [place]  Action Plan: Asthma  Immunization: Pneumococcal- done, Flu-due this fall  Next Provider Visit: 10/30/18  Next Spirometry/CPFT: none ordered     Approximate time spent with patient: 60 minutes

## 2018-09-20 ENCOUNTER — TELEPHONE (OUTPATIENT)
Dept: PULMONOLOGY | Facility: CLINIC | Age: 48
End: 2018-09-20

## 2018-09-20 NOTE — TELEPHONE ENCOUNTER
Called patient to give her [facility]'s (DME) phone number.  Patient repeated phone number back to me.

## 2018-09-21 ENCOUNTER — LAB VISIT (OUTPATIENT)
Dept: LAB | Facility: HOSPITAL | Age: 48
End: 2018-09-21
Attending: INTERNAL MEDICINE
Payer: MEDICARE

## 2018-09-21 DIAGNOSIS — R60.0 EDEMA OF LEG: ICD-10-CM

## 2018-09-21 LAB
ANION GAP SERPL CALC-SCNC: 8 MMOL/L
BUN SERPL-MCNC: 5 MG/DL
CALCIUM SERPL-MCNC: 9.8 MG/DL
CHLORIDE SERPL-SCNC: 106 MMOL/L
CO2 SERPL-SCNC: 27 MMOL/L
CREAT SERPL-MCNC: 0.8 MG/DL
EST. GFR  (AFRICAN AMERICAN): >60 ML/MIN/1.73 M^2
EST. GFR  (NON AFRICAN AMERICAN): >60 ML/MIN/1.73 M^2
GLUCOSE SERPL-MCNC: 81 MG/DL
POTASSIUM SERPL-SCNC: 3.7 MMOL/L
SODIUM SERPL-SCNC: 141 MMOL/L

## 2018-09-21 PROCEDURE — 80048 BASIC METABOLIC PNL TOTAL CA: CPT

## 2018-09-21 PROCEDURE — 36415 COLL VENOUS BLD VENIPUNCTURE: CPT

## 2018-10-09 ENCOUNTER — OFFICE VISIT (OUTPATIENT)
Dept: OPHTHALMOLOGY | Facility: CLINIC | Age: 48
End: 2018-10-09
Payer: MEDICARE

## 2018-10-09 DIAGNOSIS — H20.9 UVEITIC GLAUCOMA OF BOTH EYES, SEVERE STAGE: Primary | ICD-10-CM

## 2018-10-09 DIAGNOSIS — H54.8 LEGAL BLINDNESS: ICD-10-CM

## 2018-10-09 DIAGNOSIS — Z98.890 POST-OPERATIVE STATE: ICD-10-CM

## 2018-10-09 DIAGNOSIS — H40.43X3 UVEITIC GLAUCOMA OF BOTH EYES, SEVERE STAGE: Primary | ICD-10-CM

## 2018-10-09 PROCEDURE — 99212 OFFICE O/P EST SF 10 MIN: CPT | Mod: PBBFAC,PO | Performed by: OPHTHALMOLOGY

## 2018-10-09 PROCEDURE — 99024 POSTOP FOLLOW-UP VISIT: CPT | Mod: POP,,, | Performed by: OPHTHALMOLOGY

## 2018-10-09 PROCEDURE — 99999 PR PBB SHADOW E&M-EST. PATIENT-LVL II: CPT | Mod: PBBFAC,,, | Performed by: OPHTHALMOLOGY

## 2018-10-09 RX ORDER — KETOROLAC TROMETHAMINE 5 MG/ML
1 SOLUTION OPHTHALMIC 4 TIMES DAILY
COMMUNITY

## 2018-10-09 RX ORDER — PREDNISOLONE ACETATE 10 MG/ML
1 SUSPENSION/ DROPS OPHTHALMIC 4 TIMES DAILY
COMMUNITY

## 2018-10-09 NOTE — PROGRESS NOTES
HPI     Post-op Evaluation      Additional comments: Pred Acetate OU QID, Ketorolac QID QID, Latanoprost   OU qd, Cosopt OU BID, Atropine BID OU              Comments     1 month post G6 laser OD    No pain now, patient was experiencing itching and irritation after the   procedure, lasted for a couple of days  The patient's left eye is light sensitive. (No pain)  UVEITIC GLAUCOMA  SARCOID UVEITIS  H/O NON COMPLIANCE WITH TX  S/p cyclo cryo os 2014? Pt unsure of dates   S/o Tube shunt os 2014   G6 OS 7/26/2018  G6 OD 9/6/2018    Latanoprost qam OU   COSOPT BID OU  ATROPINE BID OU    OS: pred qid  OD: pred qid, ket qid          Last edited by Kristin Matthews on 10/9/2018  4:39 PM. (History)            Assessment /Plan     For exam results, see Encounter Report.      ICD-10-CM ICD-9-CM    1. Uveitic glaucoma of both eyes, severe stage H40.43X3 365.62     H20.9 365.73    2. Post-operative state Z98.890 V45.89    3. 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

## 2018-10-22 ENCOUNTER — LAB VISIT (OUTPATIENT)
Dept: LAB | Facility: HOSPITAL | Age: 48
End: 2018-10-22
Attending: INTERNAL MEDICINE
Payer: MEDICARE

## 2018-10-22 DIAGNOSIS — R60.0 EDEMA OF LEG: ICD-10-CM

## 2018-10-22 LAB
ANION GAP SERPL CALC-SCNC: 8 MMOL/L
BUN SERPL-MCNC: 6 MG/DL
CALCIUM SERPL-MCNC: 10.1 MG/DL
CHLORIDE SERPL-SCNC: 105 MMOL/L
CO2 SERPL-SCNC: 29 MMOL/L
CREAT SERPL-MCNC: 0.8 MG/DL
EST. GFR  (AFRICAN AMERICAN): >60 ML/MIN/1.73 M^2
EST. GFR  (NON AFRICAN AMERICAN): >60 ML/MIN/1.73 M^2
GLUCOSE SERPL-MCNC: 89 MG/DL
POTASSIUM SERPL-SCNC: 3.8 MMOL/L
SODIUM SERPL-SCNC: 142 MMOL/L

## 2018-10-22 PROCEDURE — 36415 COLL VENOUS BLD VENIPUNCTURE: CPT

## 2018-10-22 PROCEDURE — 80048 BASIC METABOLIC PNL TOTAL CA: CPT

## 2018-10-29 ENCOUNTER — TELEPHONE (OUTPATIENT)
Dept: PULMONOLOGY | Facility: CLINIC | Age: 48
End: 2018-10-29

## 2018-10-29 DIAGNOSIS — R60.0 EDEMA OF LEG: ICD-10-CM

## 2018-10-29 DIAGNOSIS — J45.40 MODERATE PERSISTENT ASTHMA WITHOUT COMPLICATION: ICD-10-CM

## 2018-10-29 RX ORDER — FUROSEMIDE 20 MG/1
20 TABLET ORAL EVERY OTHER DAY
Qty: 15 TABLET | Refills: 1 | Status: SHIPPED | OUTPATIENT
Start: 2018-10-29 | End: 2021-07-26

## 2018-10-29 NOTE — TELEPHONE ENCOUNTER
Kerryesy call to remind patient of scheduled appointment with Pulmonary Disease Management. Patient stated she did not feel she needed to see PDM at this time

## 2018-10-30 RX ORDER — ALBUTEROL SULFATE 90 UG/1
2 AEROSOL, METERED RESPIRATORY (INHALATION) EVERY 4 HOURS PRN
Qty: 18 G | Refills: 11 | Status: SHIPPED | OUTPATIENT
Start: 2018-10-30 | End: 2019-10-30

## 2018-10-30 RX ORDER — FLUTICASONE FUROATE AND VILANTEROL 200; 25 UG/1; UG/1
1 POWDER RESPIRATORY (INHALATION) DAILY
Qty: 60 EACH | Refills: 11 | Status: SHIPPED | OUTPATIENT
Start: 2018-10-30 | End: 2019-12-01 | Stop reason: SDUPTHER

## 2018-11-01 DIAGNOSIS — H20.9 UVEITIC GLAUCOMA OF BOTH EYES, SEVERE STAGE: ICD-10-CM

## 2018-11-01 DIAGNOSIS — H40.43X3 UVEITIC GLAUCOMA OF BOTH EYES, SEVERE STAGE: ICD-10-CM

## 2018-11-01 RX ORDER — DORZOLAMIDE HYDROCHLORIDE AND TIMOLOL MALEATE 20; 5 MG/ML; MG/ML
1 SOLUTION/ DROPS OPHTHALMIC EVERY 12 HOURS
Qty: 10 ML | Refills: 6 | Status: ON HOLD | OUTPATIENT
Start: 2018-11-01 | End: 2019-10-31 | Stop reason: HOSPADM

## 2018-11-01 RX ORDER — DORZOLAMIDE HYDROCHLORIDE AND TIMOLOL MALEATE 20; 5 MG/ML; MG/ML
SOLUTION/ DROPS OPHTHALMIC
Qty: 10 ML | Refills: 6 | Status: CANCELLED | OUTPATIENT
Start: 2018-11-01 | End: 2019-11-01

## 2018-11-29 ENCOUNTER — TELEPHONE (OUTPATIENT)
Dept: OPHTHALMOLOGY | Facility: CLINIC | Age: 48
End: 2018-11-29

## 2018-12-17 ENCOUNTER — HOSPITAL ENCOUNTER (OUTPATIENT)
Dept: RADIOLOGY | Facility: HOSPITAL | Age: 48
Discharge: HOME | End: 2018-12-17
Attending: INTERNAL MEDICINE
Payer: MEDICARE

## 2018-12-17 ENCOUNTER — OFFICE VISIT (OUTPATIENT)
Dept: PULMONOLOGY | Facility: CLINIC | Age: 48
End: 2018-12-17
Payer: MEDICARE

## 2018-12-17 VITALS
OXYGEN SATURATION: 99 % | RESPIRATION RATE: 18 BRPM | DIASTOLIC BLOOD PRESSURE: 80 MMHG | HEART RATE: 85 BPM | SYSTOLIC BLOOD PRESSURE: 130 MMHG | BODY MASS INDEX: 45.24 KG/M2 | WEIGHT: 265 LBS | HEIGHT: 64 IN

## 2018-12-17 DIAGNOSIS — J45.40 MODERATE PERSISTENT ASTHMA WITHOUT COMPLICATION: Chronic | ICD-10-CM

## 2018-12-17 DIAGNOSIS — E66.01 MORBID OBESITY WITH BODY MASS INDEX (BMI) OF 40.0 TO 49.9: ICD-10-CM

## 2018-12-17 DIAGNOSIS — G47.34 NOCTURNAL HYPOXEMIA: ICD-10-CM

## 2018-12-17 DIAGNOSIS — D86.9 SARCOIDOSIS: ICD-10-CM

## 2018-12-17 DIAGNOSIS — G47.33 OSA ON CPAP: Primary | ICD-10-CM

## 2018-12-17 DIAGNOSIS — R60.0 EDEMA OF LEG: ICD-10-CM

## 2018-12-17 PROCEDURE — 71046 X-RAY EXAM CHEST 2 VIEWS: CPT | Mod: 26,,, | Performed by: RADIOLOGY

## 2018-12-17 PROCEDURE — 99214 OFFICE O/P EST MOD 30 MIN: CPT | Mod: 25,S$PBB,, | Performed by: INTERNAL MEDICINE

## 2018-12-17 PROCEDURE — 90686 IIV4 VACC NO PRSV 0.5 ML IM: CPT | Mod: PBBFAC,PO

## 2018-12-17 PROCEDURE — 99999 PR PBB SHADOW E&M-EST. PATIENT-LVL IV: CPT | Mod: PBBFAC,,, | Performed by: INTERNAL MEDICINE

## 2018-12-17 PROCEDURE — 99214 OFFICE O/P EST MOD 30 MIN: CPT | Mod: PBBFAC,25,PO | Performed by: INTERNAL MEDICINE

## 2018-12-17 PROCEDURE — 71046 X-RAY EXAM CHEST 2 VIEWS: CPT | Mod: TC,FY,PO

## 2018-12-17 NOTE — ASSESSMENT & PLAN NOTE
[place] score 13  Not consistently using CPAP  Need Data card to validate  CPAP 10 cm  Using PAP and benefits discussed

## 2018-12-17 NOTE — ASSESSMENT & PLAN NOTE
ACT score 20  On BREO and CHEYANNE, DEON  FEV1 1.12L ( 50.4%)  FEV1/FVC 72  Immunisations:will get flu shot

## 2018-12-17 NOTE — PROGRESS NOTES
"Subjective:       Patient ID: Jennifer Mcclain is a 48 y.o. female.    Chief Complaint: Sleep Apnea and Shortness of Breath    Ms. Jennifer Mcclain is 48  years old  She has advanced oculus sarcoid with blindness.    Followed up for uveitis by Ophthalmology: Dr Omer,  Asthma score was 20.  [place] sleepiness score was 13.  Most of her symptoms are related to shortness of breath with activity.  LEg edema on Lasix every other day    She has noted some lower extremity edema.Improved with Lasix.  I reviewed her spirometry shows mixed defect.  Her compliance with CPAP had been poor.  Encouraged  patient to improve    Her inhalers   Breo Ellipta and CHEYANNE    Immunizations are up-to-date: will get flu shot    I have reviewed the patient's medical history in detail and updated the computerized patient record.        Asthma   There is no shortness of breath, sputum production or wheezing. Pertinent negatives include no postnasal drip. Her past medical history is significant for asthma.     Review of Systems   Constitutional: Negative.    HENT: Negative for postnasal drip.    Eyes: Negative.    Respiratory: Negative.  Negative for snoring, sputum production, shortness of breath, wheezing and dyspnea on extertion.    Cardiovascular: Positive for leg swelling.   Genitourinary: Negative.    Endocrine: endocrine negative   Musculoskeletal: Negative.    Skin: Negative.    Gastrointestinal: Negative.    Neurological: Negative.    Psychiatric/Behavioral: Negative.                 Objective:       Vitals:    12/17/18 1018   BP: 130/80   Pulse: 85   Resp: 18   SpO2: 99%  Comment: 2 LPM [facility]   Weight: 120.2 kg (264 lb 15.9 oz)   Height: 5' 4" (1.626 m)     Physical Exam   Constitutional: She is oriented to person, place, and time. Vital signs are normal. She appears well-developed and well-nourished.     She is obese.   HENT:   Head: Normocephalic.   Nose: Nose normal.   Neck: Normal range of motion. Neck supple.   Cardiovascular: Normal " rate, regular rhythm and normal heart sounds.   Pulmonary/Chest: Normal expansion, symmetric chest wall expansion, effort normal and breath sounds normal.   Abdominal: Soft.   Musculoskeletal: Normal range of motion. She exhibits edema.   Lymphadenopathy:     She has no cervical adenopathy.   Neurological: She is alert and oriented to person, place, and time. She has normal reflexes.   Skin: Skin is warm and dry. Nails show no clubbing.   Psychiatric: She has a normal mood and affect.   Nursing note and vitals reviewed.    Personal Diagnostic Review  Chest x-ray:   Findings: The lungs are clear and free of infiltrate.  No pleural effusion or pneumothorax is identified.  The heart is enlarged.    Pulmonary function tests: FEV1: 1.12  (50.4 % predicted), FVC:  1.56 (56.7 % predicted), FEV1/FVC:  72    Gallium scan     CLINICAL HISTORY:  sarcoid;  Sarcoidosis, unspecified    TECHNIQUE:  Whole body planar imaging performed at 24 and 48 hours following the IV administration of 5.8 millicuries gallium 67 citrate.    COMPARISON:  None    FINDINGS:  Normal soft tissue uptake.  Normal hepatic uptake.  Minor urinary bladder uptake.  Transverse colon uptake.    Mild increased knee uptake.      Impression       No significant pulmonary activity evident on gallium 67 citrate scan.  No significant findings.         No flowsheet data found.      Assessment:       Problem List Items Addressed This Visit     Moderate persistent asthma without complication (Chronic)     ACT score 20  On BREO and CHEYANNE, DEON  FEV1 1.12L ( 50.4%)  FEV1/FVC 72  Immunisations:will get flu shot         Relevant Orders    X-Ray Chest PA And Lateral    X-Ray Chest PA And Lateral    Spirometry with/without bronchodilator    Ambulatory Referral to Family Practice    Sarcoidosis     ACE level Normal  Advanced ocular disease followed by Dr Omer  Gallium scan in Aug 2018 did not show any activity         Relevant Orders    X-Ray Chest PA And Lateral     Ambulatory Referral to Family Practice    GEN on CPAP - Primary     [place] score 13  Not consistently using CPAP  Need Data card to validate  CPAP 10 cm  Using PAP and benefits discussed         Relevant Orders    Ambulatory Referral to Family Practice    Morbid obesity with body mass index (BMI) of 40.0 to 49.9     General weight loss/lifestyle modification strategies discussed (elicit support from others; identify saboteurs; non-food rewards).  Diet interventions: low calorie (1000 kCal/d) deficit diet           Relevant Orders    Ambulatory Referral to Family Practice    Nocturnal hypoxemia     Has Oxygen through [facility]           Other Visit Diagnoses     Edema of leg        Relevant Orders    Ambulatory Referral to Family Practice        Plan:       Improve CPAP adherence.  Had BREO and nebulizer  Additional Lasix for leg edema.  Gallium scan was normal  Establish with Primary care    Follow-up in about 3 months (around 3/17/2019), or CXR today, Download CPAP from [facility], [place], cxr next visit with Ms GARON, for Spirometry and CXR next visit, REF PRIMARY CARE, Weight loss and exercise.    This note was prepared using voice recognition system and is likely to have sound alike errors that may have been overlooked even after proof reading.  Please call me with any questions    Discussed diagnosis, its evaluation, treatment and usual course. All questions answered.    Thank you for the courtesy of participating in the care of this patient    Mike Mota MD

## 2018-12-17 NOTE — ASSESSMENT & PLAN NOTE
ACE level Normal  Advanced ocular disease followed by Dr Omer  Gallium scan in Aug 2018 did not show any activity

## 2018-12-18 ENCOUNTER — OFFICE VISIT (OUTPATIENT)
Dept: OPHTHALMOLOGY | Facility: CLINIC | Age: 48
End: 2018-12-18
Payer: MEDICARE

## 2018-12-18 DIAGNOSIS — H20.9 UVEITIC GLAUCOMA OF BOTH EYES, SEVERE STAGE: Primary | ICD-10-CM

## 2018-12-18 DIAGNOSIS — H54.8 LEGAL BLINDNESS: ICD-10-CM

## 2018-12-18 DIAGNOSIS — H40.43X3 UVEITIC GLAUCOMA OF BOTH EYES, SEVERE STAGE: Primary | ICD-10-CM

## 2018-12-18 PROCEDURE — 99999 PR PBB SHADOW E&M-EST. PATIENT-LVL I: CPT | Mod: PBBFAC,,, | Performed by: OPHTHALMOLOGY

## 2018-12-18 PROCEDURE — 92012 INTRM OPH EXAM EST PATIENT: CPT | Mod: S$PBB,,, | Performed by: OPHTHALMOLOGY

## 2018-12-18 PROCEDURE — 99211 OFF/OP EST MAY X REQ PHY/QHP: CPT | Mod: PBBFAC,PO | Performed by: OPHTHALMOLOGY

## 2018-12-18 NOTE — PROGRESS NOTES
HPI     Glaucoma      Additional comments: 1 month IOP check              Comments     The patient states her eyes are unchanged and she denies any ocular pain   today. The patient states at times her eyes will be sensitive to the   touch.  The patient states she misses her drops about 2-3 times a week.    UVEITIC GLAUCOMA  SARCOID UVEITIS  H/O NON COMPLIANCE WITH TX  S/p cyclo cryo os 2014? Pt unsure of dates   S/o Tube shunt os 2014   G6 OS 7/26/2018  G6 OD 9/6/2018    Latanoprost qam OU (not using this)  COSOPT BID OU  ATROPINE BID OU    OS: pred qid  OD: pred qid, ket qid          Last edited by Abigail Livingston on 12/18/2018  3:54 PM. (History)            Assessment /Plan     For exam results, see Encounter Report.      ICD-10-CM ICD-9-CM    1. Uveitic glaucoma of both eyes, severe stage H40.43X3 365.62 IOP low OD. Will d/c coag meds OD.     H20.9 365.73    2. 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     Return to clinic 6 weeks with IOP check

## 2019-01-04 PROCEDURE — 96365 THER/PROPH/DIAG IV INF INIT: CPT

## 2019-01-04 PROCEDURE — 96372 THER/PROPH/DIAG INJ SC/IM: CPT | Mod: 59

## 2019-01-04 PROCEDURE — 99285 EMERGENCY DEPT VISIT HI MDM: CPT | Mod: 25

## 2019-01-05 ENCOUNTER — HOSPITAL ENCOUNTER (EMERGENCY)
Facility: HOSPITAL | Age: 49
Discharge: HOME | End: 2019-01-05
Attending: EMERGENCY MEDICINE
Payer: MEDICARE

## 2019-01-05 VITALS
BODY MASS INDEX: 43.36 KG/M2 | TEMPERATURE: 99 F | SYSTOLIC BLOOD PRESSURE: 110 MMHG | HEART RATE: 81 BPM | DIASTOLIC BLOOD PRESSURE: 59 MMHG | OXYGEN SATURATION: 100 % | WEIGHT: 254 LBS | HEIGHT: 64 IN | RESPIRATION RATE: 18 BRPM

## 2019-01-05 DIAGNOSIS — M54.9 BACK PAIN, UNSPECIFIED BACK LOCATION, UNSPECIFIED BACK PAIN LATERALITY, UNSPECIFIED CHRONICITY: ICD-10-CM

## 2019-01-05 DIAGNOSIS — R07.9 CHEST PAIN: ICD-10-CM

## 2019-01-05 DIAGNOSIS — N39.0 ACUTE UTI: Primary | ICD-10-CM

## 2019-01-05 LAB
ANION GAP SERPL CALC-SCNC: 12 MMOL/L
BACTERIA #/AREA URNS HPF: ABNORMAL /HPF
BASOPHILS # BLD AUTO: 0.02 K/UL
BASOPHILS NFR BLD: 0.3 %
BILIRUB UR QL STRIP: NEGATIVE
BUN SERPL-MCNC: 13 MG/DL
CALCIUM SERPL-MCNC: 10 MG/DL
CHLORIDE SERPL-SCNC: 105 MMOL/L
CLARITY UR: CLEAR
CO2 SERPL-SCNC: 23 MMOL/L
COLOR UR: YELLOW
CREAT SERPL-MCNC: 1 MG/DL
DIFFERENTIAL METHOD: ABNORMAL
EOSINOPHIL # BLD AUTO: 0.4 K/UL
EOSINOPHIL NFR BLD: 6.6 %
ERYTHROCYTE [DISTWIDTH] IN BLOOD BY AUTOMATED COUNT: 14.6 %
EST. GFR  (AFRICAN AMERICAN): >60 ML/MIN/1.73 M^2
EST. GFR  (NON AFRICAN AMERICAN): >60 ML/MIN/1.73 M^2
GLUCOSE SERPL-MCNC: 123 MG/DL
GLUCOSE UR QL STRIP: NEGATIVE
HCT VFR BLD AUTO: 37.9 %
HGB BLD-MCNC: 12.4 G/DL
HGB UR QL STRIP: NEGATIVE
KETONES UR QL STRIP: NEGATIVE
LEUKOCYTE ESTERASE UR QL STRIP: ABNORMAL
LYMPHOCYTES # BLD AUTO: 2.9 K/UL
LYMPHOCYTES NFR BLD: 46.7 %
MCH RBC QN AUTO: 28.2 PG
MCHC RBC AUTO-ENTMCNC: 32.7 G/DL
MCV RBC AUTO: 86 FL
MICROSCOPIC COMMENT: ABNORMAL
MONOCYTES # BLD AUTO: 0.6 K/UL
MONOCYTES NFR BLD: 8.9 %
NEUTROPHILS # BLD AUTO: 2.3 K/UL
NEUTROPHILS NFR BLD: 37.5 %
NITRITE UR QL STRIP: NEGATIVE
PH UR STRIP: 6 [PH] (ref 5–8)
PLATELET # BLD AUTO: 238 K/UL
PMV BLD AUTO: 11.6 FL
POTASSIUM SERPL-SCNC: 4.1 MMOL/L
PROT UR QL STRIP: NEGATIVE
RBC # BLD AUTO: 4.39 M/UL
SODIUM SERPL-SCNC: 140 MMOL/L
SP GR UR STRIP: >=1.03 (ref 1–1.03)
SQUAMOUS #/AREA URNS HPF: 5 /HPF
URN SPEC COLLECT METH UR: ABNORMAL
UROBILINOGEN UR STRIP-ACNC: NEGATIVE EU/DL
WBC # BLD AUTO: 6.17 K/UL
WBC #/AREA URNS HPF: 30 /HPF (ref 0–5)

## 2019-01-05 PROCEDURE — 93010 ELECTROCARDIOGRAM REPORT: CPT | Mod: ,,, | Performed by: INTERNAL MEDICINE

## 2019-01-05 PROCEDURE — 85025 COMPLETE CBC W/AUTO DIFF WBC: CPT

## 2019-01-05 PROCEDURE — 81000 URINALYSIS NONAUTO W/SCOPE: CPT

## 2019-01-05 PROCEDURE — 63600175 PHARM REV CODE 636 W HCPCS: Performed by: EMERGENCY MEDICINE

## 2019-01-05 PROCEDURE — 93010 EKG 12-LEAD: ICD-10-PCS | Mod: ,,, | Performed by: INTERNAL MEDICINE

## 2019-01-05 PROCEDURE — 63600175 PHARM REV CODE 636 W HCPCS: Performed by: NURSE PRACTITIONER

## 2019-01-05 PROCEDURE — 25000003 PHARM REV CODE 250: Performed by: EMERGENCY MEDICINE

## 2019-01-05 PROCEDURE — 25000003 PHARM REV CODE 250: Performed by: NURSE PRACTITIONER

## 2019-01-05 PROCEDURE — 80048 BASIC METABOLIC PNL TOTAL CA: CPT

## 2019-01-05 PROCEDURE — 87086 URINE CULTURE/COLONY COUNT: CPT

## 2019-01-05 RX ORDER — ONDANSETRON 2 MG/ML
4 INJECTION INTRAMUSCULAR; INTRAVENOUS
Status: COMPLETED | OUTPATIENT
Start: 2019-01-05 | End: 2019-01-05

## 2019-01-05 RX ORDER — LORAZEPAM 1 MG/1
1 TABLET ORAL
Status: COMPLETED | OUTPATIENT
Start: 2019-01-05 | End: 2019-01-05

## 2019-01-05 RX ORDER — LEVOFLOXACIN 750 MG/1
750 TABLET ORAL DAILY
Qty: 5 TABLET | Refills: 0 | Status: SHIPPED | OUTPATIENT
Start: 2019-01-05 | End: 2019-01-10

## 2019-01-05 RX ORDER — MORPHINE SULFATE 10 MG/ML
4 INJECTION INTRAMUSCULAR; INTRAVENOUS; SUBCUTANEOUS
Status: COMPLETED | OUTPATIENT
Start: 2019-01-05 | End: 2019-01-05

## 2019-01-05 RX ORDER — MELOXICAM 7.5 MG/1
7.5 TABLET ORAL DAILY
Qty: 10 TABLET | Refills: 0 | Status: SHIPPED | OUTPATIENT
Start: 2019-01-05 | End: 2019-01-15

## 2019-01-05 RX ORDER — DEXAMETHASONE SODIUM PHOSPHATE 4 MG/ML
8 INJECTION, SOLUTION INTRA-ARTICULAR; INTRALESIONAL; INTRAMUSCULAR; INTRAVENOUS; SOFT TISSUE
Status: COMPLETED | OUTPATIENT
Start: 2019-01-05 | End: 2019-01-05

## 2019-01-05 RX ADMIN — LORAZEPAM 1 MG: 1 TABLET ORAL at 01:01

## 2019-01-05 RX ADMIN — ONDANSETRON 4 MG: 2 INJECTION, SOLUTION INTRAMUSCULAR; INTRAVENOUS at 01:01

## 2019-01-05 RX ADMIN — CEFTRIAXONE 1 G: 1 INJECTION, SOLUTION INTRAVENOUS at 03:01

## 2019-01-05 RX ADMIN — SODIUM CHLORIDE 250 ML: 0.9 INJECTION, SOLUTION INTRAVENOUS at 02:01

## 2019-01-05 RX ADMIN — MORPHINE SULFATE 4 MG: 10 INJECTION, SOLUTION INTRAMUSCULAR; INTRAVENOUS at 01:01

## 2019-01-05 RX ADMIN — DEXAMETHASONE SODIUM PHOSPHATE 8 MG: 4 INJECTION, SOLUTION INTRA-ARTICULAR; INTRALESIONAL; INTRAMUSCULAR; INTRAVENOUS; SOFT TISSUE at 01:01

## 2019-01-05 NOTE — ED PROVIDER NOTES
SCRIBE #1 NOTE: I, Marilee Jose, am scribing for, and in the presence of, Sophia Hernandez NP/Hiram Lund MD. I have scribed the entire note.         History     Chief Complaint   Patient presents with    Back Pain     x 2 weeks       Review of patient's allergies indicates:  No Known Allergies      History of Present Illness   HPI    1/5/2019, 1:00 AM  History obtained from the patient      History of Present Illness: Jennifer Mcclain is a 48 y.o. female patient with PMHx of sarcoidosis who presents to the Emergency Department for right flank pain which onset gradually 2 weeks ago. Patient denies any injury/trauma. Symptoms are constant and moderate in severity. Patient describes sxs as nonradiating. No mitigating or exacerbating factors reported. No other sxs reported. Prior tx includes Naproxen with no relief. Patient denies any fever, chills, abdominal pain, dysuria, hematuria, urinary frequency/urgency, N/V, and all other sxs at this time. Patient reports hx of UTIs. No further complaints or concerns at this time.     Arrival mode: Personal vehicle      PCP: Primary Doctor No        Past Medical History:  Past Medical History:   Diagnosis Date    Asthma     Blindness     Glaucoma     Psoriasis     knees and ankles    Sarcoidosis        Past Surgical History:  Past Surgical History:   Procedure Laterality Date    Laser SX Right          Family History:  Family History   Problem Relation Age of Onset    Heart disease Father     Hypertension Maternal Grandmother        Social History:  Social History     Tobacco Use    Smoking status: Never Smoker    Smokeless tobacco: Never Used   Substance and Sexual Activity    Alcohol use: No    Drug use: No    Sexual activity: Unknown        Review of Systems   Review of Systems   Constitutional: Negative for chills and fever.   HENT: Negative for sore throat.    Respiratory: Negative for shortness of breath.    Cardiovascular: Negative for chest pain.    Gastrointestinal: Negative for abdominal pain, nausea and vomiting.   Genitourinary: Positive for flank pain (right). Negative for dysuria, frequency, hematuria and urgency.   Musculoskeletal: Negative for back pain.   Skin: Negative for rash.   Neurological: Negative for weakness.   Hematological: Does not bruise/bleed easily.   All other systems reviewed and are negative.       Physical Exam     Initial Vitals [01/04/19 2251]   BP Pulse Resp Temp SpO2   (!) 141/90 99 20 98.5 °F (36.9 °C) 96 %      MAP       --          Physical Exam  Nursing Notes and Vital Signs Reviewed.  Constitutional: Patient is in no acute distress. Well-developed and well-nourished.  Head: Atraumatic. Normocephalic.  Eyes: PERRL. EOM intact. Conjunctivae are not pale. No scleral icterus.  ENT: Mucous membranes are moist. Oropharynx is clear and symmetric.    Neck: Supple. Full ROM. No lymphadenopathy.  Cardiovascular: Regular rate. Regular rhythm. No murmurs, rubs, or gallops. Distal pulses are 2+ and symmetric.  Pulmonary/Chest: No respiratory distress. Clear to auscultation bilaterally. No wheezing or rales.  Abdominal: Soft and non-distended.  There is no tenderness.  No rebound, guarding, or rigidity.   Genitourinary: Right flank tenderness  Musculoskeletal: Moves all extremities. No obvious deformities.  Back: No tenderness. No midline bony tenderness, deformities, or step-offs of the T-spine or L-spine. Skin appears normal without abrasions or bruising. No erythema, induration, or fluctuance.   Skin: Warm and dry.  Neurological:  Alert, awake, and appropriate.  Normal speech.  No acute focal neurological deficits are appreciated.  Psychiatric: Normal affect. Good eye contact. Appropriate in content.     ED Course   Procedures  ED Vital Signs:  Vitals:    01/04/19 2251 01/05/19 0306 01/05/19 0445   BP: (!) 141/90 (!) 106/53 (!) 110/59   Pulse: 99 79 81   Resp: 20 18 18   Temp: 98.5 °F (36.9 °C)     TempSrc: Oral     SpO2: 96% 100%  "100%   Weight: 115.2 kg (253 lb 15.5 oz)     Height: 5' 4" (1.626 m)         Abnormal Lab Results:  Labs Reviewed   URINALYSIS, REFLEX TO URINE CULTURE - Abnormal; Notable for the following components:       Result Value    Specific Gravity, UA >=1.030 (*)     Leukocytes, UA Trace (*)     All other components within normal limits    Narrative:     Preferred Collection Type->Urine, Clean Catch   URINALYSIS MICROSCOPIC - Abnormal; Notable for the following components:    WBC, UA 30 (*)     Bacteria, UA Few (*)     All other components within normal limits    Narrative:     Preferred Collection Type->Urine, Clean Catch   CBC W/ AUTO DIFFERENTIAL - Abnormal; Notable for the following components:    RDW 14.6 (*)     Gran% 37.5 (*)     All other components within normal limits   BASIC METABOLIC PANEL - Abnormal; Notable for the following components:    Glucose 123 (*)     All other components within normal limits   CULTURE, URINE        All Lab Results:  Results for orders placed or performed during the hospital encounter of 01/05/19   Urinalysis, Reflex to Urine Culture Urine, Clean Catch   Result Value Ref Range    Specimen UA Urine, Clean Catch     Color, UA Yellow Yellow, Straw, Evelin    Appearance, UA Clear Clear    pH, UA 6.0 5.0 - 8.0    Specific Gravity, UA >=1.030 (A) 1.005 - 1.030    Protein, UA Negative Negative    Glucose, UA Negative Negative    Ketones, UA Negative Negative    Bilirubin (UA) Negative Negative    Occult Blood UA Negative Negative    Nitrite, UA Negative Negative    Urobilinogen, UA Negative <2.0 EU/dL    Leukocytes, UA Trace (A) Negative   Urinalysis Microscopic   Result Value Ref Range    WBC, UA 30 (H) 0 - 5 /hpf    Bacteria, UA Few (A) None-Occ /hpf    Squam Epithel, UA 5 /hpf    Microscopic Comment SEE COMMENT    CBC auto differential   Result Value Ref Range    WBC 6.17 3.90 - 12.70 K/uL    RBC 4.39 4.00 - 5.40 M/uL    Hemoglobin 12.4 12.0 - 16.0 g/dL    Hematocrit 37.9 37.0 - 48.5 %    " MCV 86 82 - 98 fL    MCH 28.2 27.0 - 31.0 pg    MCHC 32.7 32.0 - 36.0 g/dL    RDW 14.6 (H) 11.5 - 14.5 %    Platelets 238 150 - 350 K/uL    MPV 11.6 9.2 - 12.9 fL    Gran # (ANC) 2.3 1.8 - 7.7 K/uL    Lymph # 2.9 1.0 - 4.8 K/uL    Mono # 0.6 0.3 - 1.0 K/uL    Eos # 0.4 0.0 - 0.5 K/uL    Baso # 0.02 0.00 - 0.20 K/uL    Gran% 37.5 (L) 38.0 - 73.0 %    Lymph% 46.7 18.0 - 48.0 %    Mono% 8.9 4.0 - 15.0 %    Eosinophil% 6.6 0.0 - 8.0 %    Basophil% 0.3 0.0 - 1.9 %    Differential Method Automated    Basic metabolic panel   Result Value Ref Range    Sodium 140 136 - 145 mmol/L    Potassium 4.1 3.5 - 5.1 mmol/L    Chloride 105 95 - 110 mmol/L    CO2 23 23 - 29 mmol/L    Glucose 123 (H) 70 - 110 mg/dL    BUN, Bld 13 6 - 20 mg/dL    Creatinine 1.0 0.5 - 1.4 mg/dL    Calcium 10.0 8.7 - 10.5 mg/dL    Anion Gap 12 8 - 16 mmol/L    eGFR if African American >60 >60 mL/min/1.73 m^2    eGFR if non African American >60 >60 mL/min/1.73 m^2       Imaging Results:  Imaging Results    None          The EKG was ordered, reviewed, and independently interpreted by the ED provider.  Interpretation time: 0301  Rate: 76 BPM  Rhythm: normal sinus rhythm  Interpretation: Normal ECG. No acute ST abnormality. No STEMI.          The Emergency Provider reviewed the vital signs and test results, which are outlined above.     ED Discussion     1:53 AM: Called by nurse to evaluate patient. Dr. Lund evaluated patient. Patient is now c/o SOB and palpitations. Patient had received morphine, ativan, dexamethasone, and zofran. Will order IV fluids, labs and EKG.    2:00 AM: Sophia Hernandez NP transfers care of pt to Dr. Lund, pending lab results.    4:29 AM: Reassessed pt at this time.  Pt states her condition has improved at this time. Family at bedside. Discussed with pt all pertinent ED information and results. Discussed pt dx and plan of tx. Gave pt all f/u and return to the ED instructions. All questions and concerns were addressed at this  time. Pt expresses understanding of information and instructions, and is comfortable with plan to discharge. Pt is stable for discharge.    I discussed with patient and/or family/caretaker that evaluation in the ED does not suggest any emergent or life threatening medical conditions requiring immediate intervention beyond what was provided in the ED, and I believe patient is safe for discharge.  Regardless, an unremarkable evaluation in the ED does not preclude the development or presence of a serious of life threatening condition. As such, patient was instructed to return immediately for any worsening or change in current symptoms.          ED Medication(s):  Medications   dexamethasone injection 8 mg (8 mg Intramuscular Given 1/5/19 0131)   LORazepam tablet 1 mg (1 mg Oral Given 1/5/19 0127)   morphine injection 4 mg (4 mg Intramuscular Given 1/5/19 0128)   ondansetron injection 4 mg (4 mg Intramuscular Given 1/5/19 0129)   sodium chloride 0.9% bolus 250 mL (0 mLs Intravenous Stopped 1/5/19 0306)   cefTRIAXone (ROCEPHIN) 1 g in dextrose 5 % 50 mL IVPB (0 g Intravenous Stopped 1/5/19 0419)     Current Discharge Medication List      START taking these medications    Details   levoFLOXacin (LEVAQUIN) 750 MG tablet Take 1 tablet (750 mg total) by mouth once daily. for 5 days  Qty: 5 tablet, Refills: 0      meloxicam (MOBIC) 7.5 MG tablet Take 1 tablet (7.5 mg total) by mouth once daily. for 10 days  Qty: 10 tablet, Refills: 0             Follow-up Information     Primary Doctor No In 3 days.           [facility]    Specialty:  Emergency Medicine  Why:  As needed, If symptoms worsen  Contact information:  [address] [facility] [address] [phone]                      Medical Decision Making     Medical Decision Making:   Clinical Tests:   Lab Tests: Ordered and Reviewed  Medical Tests: Ordered and Reviewed             Scribe Attestation:   Scribe #1: I performed the  above scribed service and the documentation accurately describes the services I performed. I attest to the accuracy of the note.     Attending:   Physician Attestation Statement for Scribe #1: I, Sophia Hernandez NP/Hiram Lund MD, personally performed the services described in this documentation, as scribed by Marilee Garcia, in my presence, and it is both accurate and complete.           Clinical Impression       ICD-10-CM ICD-9-CM   1. Acute UTI N39.0 599.0   2. Chest pain R07.9 786.50   3. Back pain, unspecified back location, unspecified back pain laterality, unspecified chronicity M54.9 724.5       Disposition:   Disposition: Discharged  Condition: Stable         Hiram Lund MD  01/05/19 0910

## 2019-01-06 LAB — BACTERIA UR CULT: NORMAL

## 2019-01-07 ENCOUNTER — OFFICE VISIT (OUTPATIENT)
Dept: CARDIOLOGY | Facility: CLINIC | Age: 49
End: 2019-01-07
Payer: MEDICARE

## 2019-01-07 VITALS
HEART RATE: 80 BPM | WEIGHT: 255.31 LBS | BODY MASS INDEX: 43.59 KG/M2 | DIASTOLIC BLOOD PRESSURE: 68 MMHG | SYSTOLIC BLOOD PRESSURE: 106 MMHG | HEIGHT: 64 IN

## 2019-01-07 DIAGNOSIS — I51.89 DIASTOLIC DYSFUNCTION: Primary | Chronic | ICD-10-CM

## 2019-01-07 DIAGNOSIS — E66.01 MORBID OBESITY WITH BODY MASS INDEX (BMI) OF 40.0 TO 49.9: ICD-10-CM

## 2019-01-07 DIAGNOSIS — D86.9 SARCOIDOSIS: ICD-10-CM

## 2019-01-07 DIAGNOSIS — G47.33 OSA ON CPAP: ICD-10-CM

## 2019-01-07 PROCEDURE — 99214 PR OFFICE/OUTPT VISIT, EST, LEVL IV, 30-39 MIN: ICD-10-PCS | Mod: S$PBB,,, | Performed by: INTERNAL MEDICINE

## 2019-01-07 PROCEDURE — 99214 OFFICE O/P EST MOD 30 MIN: CPT | Mod: S$PBB,,, | Performed by: INTERNAL MEDICINE

## 2019-01-07 PROCEDURE — 99999 PR PBB SHADOW E&M-EST. PATIENT-LVL III: CPT | Mod: PBBFAC,,, | Performed by: INTERNAL MEDICINE

## 2019-01-07 PROCEDURE — 99999 PR PBB SHADOW E&M-EST. PATIENT-LVL III: ICD-10-PCS | Mod: PBBFAC,,, | Performed by: INTERNAL MEDICINE

## 2019-01-07 PROCEDURE — 99213 OFFICE O/P EST LOW 20 MIN: CPT | Mod: PBBFAC | Performed by: INTERNAL MEDICINE

## 2019-01-07 NOTE — PROGRESS NOTES
Subjective:   Patient ID:  Jennifer Mcclain is a 48 y.o. female who presents for follow up of Chest Pain; Palpitations; Dizziness; and Shortness of Breath (short distance)      47 yo female, for f/u  PMH [facility] sarcodosis, legal blind, asthma and pulm sarcodosis, f/u at [facility] before, moved to  recently.  [place] for a yr, stable, and can walk about 25 yards before stops to catch the breathing,  Recently wad admitted to [facility] for asthma.  Occasional sharp chest pain once for two weeks, not triggered by exertion, for few seconds. Sometime dizziness. No syncope and palpitation.  Off steroid now.  Some fluttering sensation.  EKG NSR  03/2017, ECHO normal EF, LVH and DD.  SVETLANA -19%  04/2017 HOlter rare PVC          Past Medical History:   Diagnosis Date    Asthma     Blindness     Glaucoma     Psoriasis     knees and ankles    Sarcoidosis        Past Surgical History:   Procedure Laterality Date    Laser SX Right        Social History     Tobacco Use    Smoking status: Never Smoker    Smokeless tobacco: Never Used   Substance Use Topics    Alcohol use: No    Drug use: No       Family History   Problem Relation Age of Onset    Heart disease Father     Hypertension Maternal Grandmother          Review of Systems   Constitution: Negative for decreased appetite, diaphoresis, fever, weakness, malaise/fatigue and night sweats.   HENT: Negative for nosebleeds.    Eyes: Positive for vision loss in left eye and vision loss in right eye. Negative for blurred vision and double vision.   Cardiovascular: Positive for dyspnea on exertion. Negative for chest pain, claudication, irregular heartbeat, leg swelling, near-syncope, orthopnea, palpitations, paroxysmal nocturnal dyspnea and syncope.   Respiratory: Positive for shortness of breath. Negative for cough, sleep disturbances due to breathing, snoring, sputum production and wheezing.    Endocrine: Negative for cold intolerance and polyuria.   Hematologic/Lymphatic: Does  not bruise/bleed easily.   Skin: Negative for rash.   Musculoskeletal: Negative for back pain, falls, joint pain, joint swelling and neck pain.   Gastrointestinal: Negative for abdominal pain, heartburn, nausea and vomiting.   Genitourinary: Negative for dysuria, frequency and hematuria.   Neurological: Negative for difficulty with concentration, dizziness, focal weakness, headaches, light-headedness, numbness and seizures.   Psychiatric/Behavioral: Negative for depression, memory loss and substance abuse. The patient does not have insomnia.    Allergic/Immunologic: Negative for HIV exposure and hives.       Objective:   Physical Exam   Constitutional: She is oriented to person, place, and time. She appears well-nourished.   HENT:   Head: Normocephalic.   Eyes: Pupils are equal, round, and reactive to light.   Neck: Normal carotid pulses and no JVD present. Carotid bruit is not present. No thyromegaly present.   Cardiovascular: Normal rate, regular rhythm, normal heart sounds and normal pulses.  No extrasystoles are present. PMI is not displaced. Exam reveals no gallop and no S3.   No murmur heard.  Pulmonary/Chest: Breath sounds normal. No stridor. No respiratory distress.   Abdominal: Soft. Bowel sounds are normal. There is no tenderness. There is no rebound.   Musculoskeletal: Normal range of motion.   Neurological: She is alert and oriented to person, place, and time.   Skin: Skin is intact. No rash noted.   Psychiatric: Her behavior is normal.       Lab Results   Component Value Date    CHOL 209 (H) 04/18/2017     Lab Results   Component Value Date    HDL 59 04/18/2017     Lab Results   Component Value Date    LDLCALC 135.8 04/18/2017     Lab Results   Component Value Date    TRIG 71 04/18/2017     Lab Results   Component Value Date    CHOLHDL 28.2 04/18/2017       Chemistry        Component Value Date/Time     01/05/2019 0235    K 4.1 01/05/2019 0235     01/05/2019 0235    CO2 23 01/05/2019 0235     BUN 13 01/05/2019 0235    CREATININE 1.0 01/05/2019 0235     (H) 01/05/2019 0235        Component Value Date/Time    CALCIUM 10.0 01/05/2019 0235    ALKPHOS 67 02/12/2018 2109    AST 43 (H) 02/12/2018 2109    ALT 30 02/12/2018 2109    BILITOT 0.5 02/12/2018 2109    ESTGFRAFRICA >60 01/05/2019 0235    EGFRNONAA >60 01/05/2019 0235          No results found for: LABA1C, HGBA1C  No results found for: TSH  Lab Results   Component Value Date    INR 1.0 02/11/2018     Lab Results   Component Value Date    WBC 6.17 01/05/2019    HGB 12.4 01/05/2019    HCT 37.9 01/05/2019    MCV 86 01/05/2019     01/05/2019     BMP  Sodium   Date Value Ref Range Status   01/05/2019 140 136 - 145 mmol/L Final     Potassium   Date Value Ref Range Status   01/05/2019 4.1 3.5 - 5.1 mmol/L Final     Chloride   Date Value Ref Range Status   01/05/2019 105 95 - 110 mmol/L Final     CO2   Date Value Ref Range Status   01/05/2019 23 23 - 29 mmol/L Final     BUN, Bld   Date Value Ref Range Status   01/05/2019 13 6 - 20 mg/dL Final     Creatinine   Date Value Ref Range Status   01/05/2019 1.0 0.5 - 1.4 mg/dL Final     Calcium   Date Value Ref Range Status   01/05/2019 10.0 8.7 - 10.5 mg/dL Final     Anion Gap   Date Value Ref Range Status   01/05/2019 12 8 - 16 mmol/L Final     eGFR if    Date Value Ref Range Status   01/05/2019 >60 >60 mL/min/1.73 m^2 Final     eGFR if non    Date Value Ref Range Status   01/05/2019 >60 >60 mL/min/1.73 m^2 Final     Comment:     Calculation used to obtain the estimated glomerular filtration  rate (eGFR) is the CKD-EPI equation.        BNP  @LABRCNTIP(BNP,BNPTRIAGEBLO)@  @LABRCNTIP(troponini)@  Estimated Creatinine Clearance: 84 mL/min (based on SCr of 1 mg/dL).  No results found in the last 24 hours.  No results found in the last 24 hours.  No results found in the last 24 hours.    Assessment:      1. Diastolic dysfunction    2. Sarcoidosis    3. GEN on CPAP    4. Morbid  obesity with body mass index (BMI) of 40.0 to 49.9       EF normal.  Still occasional chest pain and palpitation  [place] stable. Walking to the car at home  CXR cardiomegaly  ON CPAP with O2  Plan:   Repeat HOLTER  Cardiac MRI ordered due to cardiomegaly on CXR  RTC in 1 yr

## 2019-01-14 ENCOUNTER — TELEPHONE (OUTPATIENT)
Dept: INTERNAL MEDICINE | Facility: CLINIC | Age: 49
End: 2019-01-14

## 2019-01-28 ENCOUNTER — TELEPHONE (OUTPATIENT)
Dept: PULMONOLOGY | Facility: CLINIC | Age: 49
End: 2019-01-28

## 2019-01-28 NOTE — TELEPHONE ENCOUNTER
Chronic Disease Management  Called patient to complete Pulmonary Disease Management Questionnaire. No answer      Time  spent with patient:  Minutes

## 2019-04-11 ENCOUNTER — PATIENT OUTREACH (OUTPATIENT)
Dept: PULMONOLOGY | Facility: CLINIC | Age: 49
End: 2019-04-11

## 2019-10-26 ENCOUNTER — HOSPITAL ENCOUNTER (EMERGENCY)
Facility: HOSPITAL | Age: 49
Discharge: HOME | End: 2019-10-26
Attending: EMERGENCY MEDICINE
Payer: MEDICARE

## 2019-10-26 VITALS
BODY MASS INDEX: 51.91 KG/M2 | OXYGEN SATURATION: 96 % | HEART RATE: 108 BPM | DIASTOLIC BLOOD PRESSURE: 72 MMHG | HEIGHT: 63 IN | RESPIRATION RATE: 20 BRPM | TEMPERATURE: 98 F | WEIGHT: 293 LBS | SYSTOLIC BLOOD PRESSURE: 162 MMHG

## 2019-10-26 DIAGNOSIS — J45.40 MODERATE PERSISTENT ASTHMA WITHOUT COMPLICATION: ICD-10-CM

## 2019-10-26 DIAGNOSIS — J45.901 EXACERBATION OF ASTHMA, UNSPECIFIED ASTHMA SEVERITY, UNSPECIFIED WHETHER PERSISTENT: ICD-10-CM

## 2019-10-26 DIAGNOSIS — J44.1 COPD EXACERBATION: Primary | ICD-10-CM

## 2019-10-26 DIAGNOSIS — J45.901 ASTHMA ATTACK: ICD-10-CM

## 2019-10-26 LAB
ALBUMIN SERPL BCP-MCNC: 3.6 G/DL (ref 3.5–5.2)
ALP SERPL-CCNC: 57 U/L (ref 55–135)
ALT SERPL W/O P-5'-P-CCNC: 19 U/L (ref 10–44)
ANION GAP SERPL CALC-SCNC: 11 MMOL/L (ref 8–16)
AST SERPL-CCNC: 25 U/L (ref 10–40)
BASOPHILS # BLD AUTO: 0.02 K/UL (ref 0–0.2)
BASOPHILS NFR BLD: 0.3 % (ref 0–1.9)
BILIRUB SERPL-MCNC: 0.3 MG/DL (ref 0.1–1)
BUN SERPL-MCNC: 11 MG/DL (ref 6–20)
CALCIUM SERPL-MCNC: 9.8 MG/DL (ref 8.7–10.5)
CHLORIDE SERPL-SCNC: 106 MMOL/L (ref 95–110)
CO2 SERPL-SCNC: 24 MMOL/L (ref 23–29)
CREAT SERPL-MCNC: 0.8 MG/DL (ref 0.5–1.4)
DIFFERENTIAL METHOD: ABNORMAL
EOSINOPHIL # BLD AUTO: 0.3 K/UL (ref 0–0.5)
EOSINOPHIL NFR BLD: 3.3 % (ref 0–8)
ERYTHROCYTE [DISTWIDTH] IN BLOOD BY AUTOMATED COUNT: 14.5 % (ref 11.5–14.5)
EST. GFR  (AFRICAN AMERICAN): >60 ML/MIN/1.73 M^2
EST. GFR  (NON AFRICAN AMERICAN): >60 ML/MIN/1.73 M^2
GLUCOSE SERPL-MCNC: 119 MG/DL (ref 70–110)
HCT VFR BLD AUTO: 35.2 % (ref 37–48.5)
HGB BLD-MCNC: 11.3 G/DL (ref 12–16)
IMM GRANULOCYTES # BLD AUTO: 0.02 K/UL (ref 0–0.04)
IMM GRANULOCYTES NFR BLD AUTO: 0.3 % (ref 0–0.5)
LYMPHOCYTES # BLD AUTO: 1.7 K/UL (ref 1–4.8)
LYMPHOCYTES NFR BLD: 21.4 % (ref 18–48)
MCH RBC QN AUTO: 28.3 PG (ref 27–31)
MCHC RBC AUTO-ENTMCNC: 32.1 G/DL (ref 32–36)
MCV RBC AUTO: 88 FL (ref 82–98)
MONOCYTES # BLD AUTO: 0.7 K/UL (ref 0.3–1)
MONOCYTES NFR BLD: 8.9 % (ref 4–15)
NEUTROPHILS # BLD AUTO: 5.3 K/UL (ref 1.8–7.7)
NEUTROPHILS NFR BLD: 65.8 % (ref 38–73)
NRBC BLD-RTO: 0 /100 WBC
PLATELET # BLD AUTO: 214 K/UL (ref 150–350)
PMV BLD AUTO: 12 FL (ref 9.2–12.9)
POTASSIUM SERPL-SCNC: 3.4 MMOL/L (ref 3.5–5.1)
PROT SERPL-MCNC: 6.9 G/DL (ref 6–8.4)
RBC # BLD AUTO: 3.99 M/UL (ref 4–5.4)
SODIUM SERPL-SCNC: 141 MMOL/L (ref 136–145)
TROPONIN I SERPL DL<=0.01 NG/ML-MCNC: <0.006 NG/ML (ref 0–0.03)
WBC # BLD AUTO: 7.99 K/UL (ref 3.9–12.7)

## 2019-10-26 PROCEDURE — 93010 ELECTROCARDIOGRAM REPORT: CPT | Mod: ,,, | Performed by: INTERNAL MEDICINE

## 2019-10-26 PROCEDURE — 82803 BLOOD GASES ANY COMBINATION: CPT

## 2019-10-26 PROCEDURE — 85025 COMPLETE CBC W/AUTO DIFF WBC: CPT

## 2019-10-26 PROCEDURE — 94640 AIRWAY INHALATION TREATMENT: CPT

## 2019-10-26 PROCEDURE — 93010 EKG 12-LEAD: ICD-10-PCS | Mod: ,,, | Performed by: INTERNAL MEDICINE

## 2019-10-26 PROCEDURE — 99285 EMERGENCY DEPT VISIT HI MDM: CPT | Mod: 25

## 2019-10-26 PROCEDURE — 25000242 PHARM REV CODE 250 ALT 637 W/ HCPCS: Performed by: EMERGENCY MEDICINE

## 2019-10-26 PROCEDURE — 96374 THER/PROPH/DIAG INJ IV PUSH: CPT

## 2019-10-26 PROCEDURE — 99900035 HC TECH TIME PER 15 MIN (STAT)

## 2019-10-26 PROCEDURE — 36600 WITHDRAWAL OF ARTERIAL BLOOD: CPT

## 2019-10-26 PROCEDURE — 84484 ASSAY OF TROPONIN QUANT: CPT

## 2019-10-26 PROCEDURE — 93005 ELECTROCARDIOGRAM TRACING: CPT

## 2019-10-26 PROCEDURE — 80053 COMPREHEN METABOLIC PANEL: CPT

## 2019-10-26 PROCEDURE — 63600175 PHARM REV CODE 636 W HCPCS: Performed by: EMERGENCY MEDICINE

## 2019-10-26 RX ORDER — ALBUTEROL SULFATE 90 UG/1
2 AEROSOL, METERED RESPIRATORY (INHALATION) EVERY 6 HOURS PRN
Qty: 18 G | Refills: 11 | Status: ON HOLD | OUTPATIENT
Start: 2019-10-26 | End: 2019-10-31 | Stop reason: HOSPADM

## 2019-10-26 RX ORDER — METHYLPREDNISOLONE 4 MG/1
TABLET ORAL
Qty: 1 PACKAGE | Refills: 0 | Status: ON HOLD | OUTPATIENT
Start: 2019-10-26 | End: 2019-10-31 | Stop reason: HOSPADM

## 2019-10-26 RX ORDER — IPRATROPIUM BROMIDE AND ALBUTEROL SULFATE 2.5; .5 MG/3ML; MG/3ML
3 SOLUTION RESPIRATORY (INHALATION)
Status: COMPLETED | OUTPATIENT
Start: 2019-10-26 | End: 2019-10-26

## 2019-10-26 RX ORDER — IPRATROPIUM BROMIDE AND ALBUTEROL SULFATE 2.5; .5 MG/3ML; MG/3ML
SOLUTION RESPIRATORY (INHALATION)
Status: DISPENSED
Start: 2019-10-26 | End: 2019-10-26

## 2019-10-26 RX ORDER — METHYLPREDNISOLONE SOD SUCC 125 MG
125 VIAL (EA) INJECTION
Status: COMPLETED | OUTPATIENT
Start: 2019-10-26 | End: 2019-10-26

## 2019-10-26 RX ORDER — IPRATROPIUM BROMIDE AND ALBUTEROL SULFATE 2.5; .5 MG/3ML; MG/3ML
3 SOLUTION RESPIRATORY (INHALATION) EVERY 8 HOURS PRN
Qty: 360 ML | Refills: 1 | Status: SHIPPED | OUTPATIENT
Start: 2019-10-26 | End: 2021-07-26

## 2019-10-26 RX ADMIN — IPRATROPIUM BROMIDE AND ALBUTEROL SULFATE 3 ML: .5; 3 SOLUTION RESPIRATORY (INHALATION) at 09:10

## 2019-10-26 RX ADMIN — METHYLPREDNISOLONE SODIUM SUCCINATE 125 MG: 125 INJECTION, POWDER, FOR SOLUTION INTRAMUSCULAR; INTRAVENOUS at 07:10

## 2019-10-26 RX ADMIN — IPRATROPIUM BROMIDE AND ALBUTEROL SULFATE 3 ML: .5; 3 SOLUTION RESPIRATORY (INHALATION) at 07:10

## 2019-10-26 NOTE — ED PROVIDER NOTES
SCRIBE #1 NOTE: I, Mike Murray, am scribing for, and in the presence of, Parrish White MD. I have scribed the entire note.       History     Chief Complaint   Patient presents with    Shortness of Breath     asthma exacerbation     Review of patient's allergies indicates:  No Known Allergies      History of Present Illness     HPI    10/26/2019, 6:17 AM  History obtained from the patient      History of Present Illness: Jennifer Mcclain is a 49 y.o. female patient with a PMHx of asthma who presents to the Emergency Department for evaluation of SOB which onset gradually 2 days ago. Symptoms are constant and moderate in severity. No mitigating or exacerbating factors reported. Associated sxs include cough. Patient denies any recent travel, long car trips, fever, leg pain/swelling, CP, n/v, and all other sxs at this time. Prior Tx includes inhaler with no relief. No further complaints or concerns at this time.         Arrival mode: EMS    PCP: Primary Doctor No        Past Medical History:  Past Medical History:   Diagnosis Date    Asthma     Blindness     Glaucoma     Psoriasis     knees and ankles    Sarcoidosis        Past Surgical History:  Past Surgical History:   Procedure Laterality Date    Laser SX Right          Family History:  Family History   Problem Relation Age of Onset    Heart disease Father     Hypertension Maternal Grandmother        Social History:  Social History     Tobacco Use    Smoking status: Never Smoker    Smokeless tobacco: Never Used   Substance and Sexual Activity    Alcohol use: No    Drug use: No    Sexual activity: Not on file        Review of Systems     Review of Systems   Constitutional: Negative for chills and fever.   HENT: Negative for congestion, rhinorrhea and sore throat.    Respiratory: Positive for cough and shortness of breath.    Cardiovascular: Negative for chest pain and leg swelling.   Gastrointestinal: Negative for diarrhea, nausea and vomiting.  "  Genitourinary: Negative for dysuria.   Musculoskeletal: Negative for back pain.   Skin: Negative for rash.   Neurological: Negative for dizziness, weakness and headaches.   Hematological: Does not bruise/bleed easily.   All other systems reviewed and are negative.       Physical Exam     Initial Vitals   BP Pulse Resp Temp SpO2   10/26/19 0555 10/26/19 0553 10/26/19 0555 10/26/19 0555 10/26/19 0555   (!) 158/80 90 18 98.6 °F (37 °C) 100 %      MAP       --                 Physical Exam  Nursing Notes and Vital Signs Reviewed.  Constitutional: Patient is in no apparent distress. Well-developed and well-nourished.  Head: Atraumatic. Normocephalic.  Eyes: PERRL. EOM intact. Conjunctivae are not pale. No scleral icterus.  ENT: Mucous membranes are moist. Oropharynx is clear and symmetric.    Neck: Supple. Full ROM. No lymphadenopathy.  Cardiovascular: Tachycardic. Regular rhythm. No murmurs, rubs, or gallops. Distal pulses are 2+ and symmetric.  Pulmonary/Chest: No respiratory distress. Wheezing noted. No  rales.  Abdominal: Soft and non-distended.  There is no tenderness.  No rebound, guarding, or rigidity. Good bowel sounds.  Genitourinary: No CVA tenderness  Musculoskeletal: Moves all extremities. No obvious deformities. No edema.   Skin: Warm and dry.  Neurological:  Alert, awake, and appropriate.  Normal speech.  No acute focal neurological deficits are appreciated.  Psychiatric: Normal affect. Good eye contact. Appropriate in content.     ED Course   Procedures  ED Vital Signs:  Vitals:    10/26/19 0553 10/26/19 0555 10/26/19 0650 10/26/19 0740   BP:  (!) 158/80 (!) 155/76    Pulse: 90 106 104 102   Resp:  18 18 (!) 22   Temp:  98.6 °F (37 °C)     TempSrc:  Oral     SpO2:  100% 96% 97%   Weight:  (!) 136.8 kg (301 lb 8 oz)     Height:  5' 3" (1.6 m)      10/26/19 0745 10/26/19 0749 10/26/19 0850 10/26/19 0955   BP:   (!) 179/74    Pulse: 97 100 (!) 112 109   Resp: 20 20 (!) 22 (!) 32   Temp:   98.5 °F (36.9 °C)  "   TempSrc:   Oral    SpO2: 100% 100% 95% 98%   Weight:       Height:        10/26/19 0959 10/26/19 1000   BP:  (!) 162/72   Pulse: 108 108   Resp: (!) 28 20   Temp:  98.3 °F (36.8 °C)   TempSrc:  Oral   SpO2: 97% 96%   Weight:     Height:         Abnormal Lab Results:  Labs Reviewed   CBC W/ AUTO DIFFERENTIAL - Abnormal; Notable for the following components:       Result Value    RBC 3.99 (*)     Hemoglobin 11.3 (*)     Hematocrit 35.2 (*)     All other components within normal limits   COMPREHENSIVE METABOLIC PANEL - Abnormal; Notable for the following components:    Potassium 3.4 (*)     Glucose 119 (*)     All other components within normal limits   TROPONIN I        All Lab Results:  Results for orders placed or performed during the hospital encounter of 10/26/19   CBC auto differential   Result Value Ref Range    WBC 7.99 3.90 - 12.70 K/uL    RBC 3.99 (L) 4.00 - 5.40 M/uL    Hemoglobin 11.3 (L) 12.0 - 16.0 g/dL    Hematocrit 35.2 (L) 37.0 - 48.5 %    Mean Corpuscular Volume 88 82 - 98 fL    Mean Corpuscular Hemoglobin 28.3 27.0 - 31.0 pg    Mean Corpuscular Hemoglobin Conc 32.1 32.0 - 36.0 g/dL    RDW 14.5 11.5 - 14.5 %    Platelets 214 150 - 350 K/uL    MPV 12.0 9.2 - 12.9 fL    Immature Granulocytes 0.3 0.0 - 0.5 %    Gran # (ANC) 5.3 1.8 - 7.7 K/uL    Immature Grans (Abs) 0.02 0.00 - 0.04 K/uL    Lymph # 1.7 1.0 - 4.8 K/uL    Mono # 0.7 0.3 - 1.0 K/uL    Eos # 0.3 0.0 - 0.5 K/uL    Baso # 0.02 0.00 - 0.20 K/uL    nRBC 0 0 /100 WBC    Gran% 65.8 38.0 - 73.0 %    Lymph% 21.4 18.0 - 48.0 %    Mono% 8.9 4.0 - 15.0 %    Eosinophil% 3.3 0.0 - 8.0 %    Basophil% 0.3 0.0 - 1.9 %    Differential Method Automated    Comprehensive metabolic panel   Result Value Ref Range    Sodium 141 136 - 145 mmol/L    Potassium 3.4 (L) 3.5 - 5.1 mmol/L    Chloride 106 95 - 110 mmol/L    CO2 24 23 - 29 mmol/L    Glucose 119 (H) 70 - 110 mg/dL    BUN, Bld 11 6 - 20 mg/dL    Creatinine 0.8 0.5 - 1.4 mg/dL    Calcium 9.8 8.7 - 10.5  mg/dL    Total Protein 6.9 6.0 - 8.4 g/dL    Albumin 3.6 3.5 - 5.2 g/dL    Total Bilirubin 0.3 0.1 - 1.0 mg/dL    Alkaline Phosphatase 57 55 - 135 U/L    AST 25 10 - 40 U/L    ALT 19 10 - 44 U/L    Anion Gap 11 8 - 16 mmol/L    eGFR if African American >60 >60 mL/min/1.73 m^2    eGFR if non African American >60 >60 mL/min/1.73 m^2   Troponin I   Result Value Ref Range    Troponin I <0.006 0.000 - 0.026 ng/mL         Imaging Results:  Imaging Results          X-Ray Chest 1 View (Final result)  Result time 10/26/19 08:17:26    Final result by Petrona Evans MD (10/26/19 08:17:26)                 Impression:      No cardiopulmonary process noted.      Electronically signed by: Petrona Evans  Date:    10/26/2019  Time:    08:17             Narrative:    EXAMINATION:  XR CHEST 1 VIEW    CLINICAL HISTORY:  Unspecified asthma with (acute) exacerbation    COMPARISON:  December 2018    FINDINGS:  No infiltrate or effusion identified.  Cardiomediastinal silhouette is within normal limits.                                 The EKG was ordered, reviewed, and independently interpreted by the ED provider.  Interpretation time: 714  Rate: 104 BPM  Rhythm: sinus tachycardia  Interpretation: Possible inferior infarct. Cannot rule out anterior infarct. No STEMI.             The Emergency Provider reviewed the vital signs and test results, which are outlined above.     ED Discussion       9:52 AM: Reassessed pt at this time.  Pt states her condition has improved at this time. Discussed with pt all pertinent ED information and results. Discussed pt dx and plan of tx. Gave pt all f/u and return to the ED instructions. All questions and concerns were addressed at this time. Pt expresses understanding of information and instructions, and is comfortable with plan to discharge. Pt is stable for discharge.    I discussed with patient and/or family/caretaker that evaluation in the ED does not suggest any emergent or life  threatening medical conditions requiring immediate intervention beyond what was provided in the ED, and I believe patient is safe for discharge.  Regardless, an unremarkable evaluation in the ED does not preclude the development or presence of a serious of life threatening condition. As such, patient was instructed to return immediately for any worsening or change in current symptoms.         Medical Decision Making:   Clinical Tests:   Lab Tests: Ordered and Reviewed  Radiological Study: Ordered and Reviewed  Medical Tests: Ordered and Reviewed           ED Medication(s):  Medications   methylPREDNISolone sodium succinate injection 125 mg (125 mg Intravenous Given 10/26/19 0710)   albuterol-ipratropium 2.5 mg-0.5 mg/3 mL nebulizer solution 3 mL (3 mLs Nebulization Given 10/26/19 0749)   albuterol-ipratropium 2.5 mg-0.5 mg/3 mL nebulizer solution 3 mL (3 mLs Nebulization Given 10/26/19 0955)       New Prescriptions    ALBUTEROL (PROVENTIL/VENTOLIN HFA) 90 MCG/ACTUATION INHALER    Inhale 2 puffs into the lungs every 6 (six) hours as needed for Wheezing.    METHYLPREDNISOLONE (MEDROL DOSEPACK) 4 MG TABLET    Taper as directed       Follow-up Information     PCP. Call in 1 day.           [facility] - .    Specialty:  Emergency Medicine  Why:  If symptoms worsen  Contact information:  [address]  [facility] [address] [phone]                     Scribe Attestation:   Scribe #1: I performed the above scribed service and the documentation accurately describes the services I performed. I attest to the accuracy of the note.     Attending:   Physician Attestation Statement for Scribe #1: I, Parrish White MD, personally performed the services described in this documentation, as scribed by Mike Murray, in my presence, and it is both accurate and complete.           Clinical Impression       ICD-10-CM ICD-9-CM   1. COPD exacerbation J44.1 491.21   2. Asthma attack  J45.901 493.92   3. Exacerbation of asthma, unspecified asthma severity, unspecified whether persistent J45.901 493.92   4. Moderate persistent asthma without complication J45.40 493.90       Disposition:   Disposition: Discharged  Condition: Stable         Parrish White MD  10/26/19 1035

## 2019-10-26 NOTE — ED NOTES
Report received from JEREMIAH Go. Introduced self to pt and updated whiteboard.\    Patient identifiers verified and correct for Jennifer Mcclain.    LOC: The patient is awake, alert and aware of environment with an appropriate affect, the patient is oriented x 3 and speaking appropriately.  HEENT: Pt is blind  APPEARANCE: Patient resting comfortably and in no acute distress, patient is clean and well groomed, patient's clothing is properly fastened.  SKIN: The skin is warm and dry, color consistent with ethnicity, patient has normal skin turgor and moist mucus membranes, skin intact, no breakdown or bruising noted.  MUSCULOSKELETAL: Patient moving all extremities spontaneously.  RESPIRATORY: Airway is open and patent, respirations are spontaneous and at an increased rate.  CARDIAC: Patient has an increased rate, no peripheral edema noted, capillary refill < 3 seconds.  ABDOMEN: Soft and non tender to palpation.

## 2019-10-26 NOTE — PROGRESS NOTES
Breathing treatments ordered before my shift started order was not on my task list , night respiratory therapist did not give treatments. I am giving treatments at this time

## 2019-10-26 NOTE — PROGRESS NOTES
Patient on room air with spo2 of 97 % . Not in distress at this time. Strong cough . Faint expiratory wheeze after second breathing treatment

## 2019-10-29 ENCOUNTER — HOSPITAL ENCOUNTER (OUTPATIENT)
Facility: HOSPITAL | Age: 49
Discharge: HOME | End: 2019-10-31
Attending: EMERGENCY MEDICINE | Admitting: INTERNAL MEDICINE
Payer: MEDICARE

## 2019-10-29 DIAGNOSIS — R06.00 DYSPNEA: ICD-10-CM

## 2019-10-29 DIAGNOSIS — J45.901 MODERATE ACUTE EXACERBATION OF ASTHMA: Primary | ICD-10-CM

## 2019-10-29 DIAGNOSIS — R06.02 SOB (SHORTNESS OF BREATH): ICD-10-CM

## 2019-10-29 LAB
ALBUMIN SERPL BCP-MCNC: 3.7 G/DL (ref 3.5–5.2)
ALP SERPL-CCNC: 59 U/L (ref 55–135)
ALT SERPL W/O P-5'-P-CCNC: 27 U/L (ref 10–44)
ANION GAP SERPL CALC-SCNC: 11 MMOL/L (ref 8–16)
AST SERPL-CCNC: 43 U/L (ref 10–40)
BASOPHILS # BLD AUTO: 0.02 K/UL (ref 0–0.2)
BASOPHILS NFR BLD: 0.2 % (ref 0–1.9)
BILIRUB SERPL-MCNC: 0.6 MG/DL (ref 0.1–1)
BILIRUB UR QL STRIP: NEGATIVE
BNP SERPL-MCNC: 11 PG/ML (ref 0–99)
BUN SERPL-MCNC: 9 MG/DL (ref 6–20)
CALCIUM SERPL-MCNC: 9.3 MG/DL (ref 8.7–10.5)
CHLORIDE SERPL-SCNC: 103 MMOL/L (ref 95–110)
CLARITY UR: CLEAR
CO2 SERPL-SCNC: 28 MMOL/L (ref 23–29)
COLOR UR: YELLOW
CREAT SERPL-MCNC: 0.9 MG/DL (ref 0.5–1.4)
DIFFERENTIAL METHOD: ABNORMAL
EOSINOPHIL # BLD AUTO: 0.4 K/UL (ref 0–0.5)
EOSINOPHIL NFR BLD: 4.9 % (ref 0–8)
ERYTHROCYTE [DISTWIDTH] IN BLOOD BY AUTOMATED COUNT: 14.6 % (ref 11.5–14.5)
EST. GFR  (AFRICAN AMERICAN): >60 ML/MIN/1.73 M^2
EST. GFR  (NON AFRICAN AMERICAN): >60 ML/MIN/1.73 M^2
GLUCOSE SERPL-MCNC: 98 MG/DL (ref 70–110)
GLUCOSE UR QL STRIP: NEGATIVE
HCT VFR BLD AUTO: 35.5 % (ref 37–48.5)
HGB BLD-MCNC: 11.4 G/DL (ref 12–16)
HGB UR QL STRIP: NEGATIVE
IMM GRANULOCYTES # BLD AUTO: 0.03 K/UL (ref 0–0.04)
IMM GRANULOCYTES NFR BLD AUTO: 0.3 % (ref 0–0.5)
KETONES UR QL STRIP: NEGATIVE
LACTATE SERPL-SCNC: 1.4 MMOL/L (ref 0.5–2.2)
LACTATE SERPL-SCNC: 3 MMOL/L (ref 0.5–2.2)
LEUKOCYTE ESTERASE UR QL STRIP: NEGATIVE
LYMPHOCYTES # BLD AUTO: 1.8 K/UL (ref 1–4.8)
LYMPHOCYTES NFR BLD: 21.3 % (ref 18–48)
MCH RBC QN AUTO: 28.1 PG (ref 27–31)
MCHC RBC AUTO-ENTMCNC: 32.1 G/DL (ref 32–36)
MCV RBC AUTO: 87 FL (ref 82–98)
MONOCYTES # BLD AUTO: 1 K/UL (ref 0.3–1)
MONOCYTES NFR BLD: 11.1 % (ref 4–15)
NEUTROPHILS # BLD AUTO: 5.4 K/UL (ref 1.8–7.7)
NEUTROPHILS NFR BLD: 62.2 % (ref 38–73)
NITRITE UR QL STRIP: NEGATIVE
NRBC BLD-RTO: 0 /100 WBC
PH UR STRIP: 6 [PH] (ref 5–8)
PLATELET # BLD AUTO: 232 K/UL (ref 150–350)
PMV BLD AUTO: 11.6 FL (ref 9.2–12.9)
POTASSIUM SERPL-SCNC: 3.3 MMOL/L (ref 3.5–5.1)
PROCALCITONIN SERPL IA-MCNC: 0.02 NG/ML
PROT SERPL-MCNC: 7 G/DL (ref 6–8.4)
PROT UR QL STRIP: NEGATIVE
RBC # BLD AUTO: 4.06 M/UL (ref 4–5.4)
SODIUM SERPL-SCNC: 142 MMOL/L (ref 136–145)
SP GR UR STRIP: 1.01 (ref 1–1.03)
URN SPEC COLLECT METH UR: NORMAL
UROBILINOGEN UR STRIP-ACNC: NEGATIVE EU/DL
WBC # BLD AUTO: 8.65 K/UL (ref 3.9–12.7)

## 2019-10-29 PROCEDURE — 25000003 PHARM REV CODE 250: Performed by: NURSE PRACTITIONER

## 2019-10-29 PROCEDURE — 25000242 PHARM REV CODE 250 ALT 637 W/ HCPCS: Performed by: NURSE PRACTITIONER

## 2019-10-29 PROCEDURE — 99291 CRITICAL CARE FIRST HOUR: CPT | Mod: 25

## 2019-10-29 PROCEDURE — 96374 THER/PROPH/DIAG INJ IV PUSH: CPT

## 2019-10-29 PROCEDURE — G0378 HOSPITAL OBSERVATION PER HR: HCPCS

## 2019-10-29 PROCEDURE — 87040 BLOOD CULTURE FOR BACTERIA: CPT | Mod: 59

## 2019-10-29 PROCEDURE — 36415 COLL VENOUS BLD VENIPUNCTURE: CPT

## 2019-10-29 PROCEDURE — 96372 THER/PROPH/DIAG INJ SC/IM: CPT

## 2019-10-29 PROCEDURE — 93010 EKG 12-LEAD: ICD-10-PCS | Mod: ,,, | Performed by: INTERNAL MEDICINE

## 2019-10-29 PROCEDURE — 27000221 HC OXYGEN, UP TO 24 HOURS

## 2019-10-29 PROCEDURE — 83605 ASSAY OF LACTIC ACID: CPT | Mod: 91

## 2019-10-29 PROCEDURE — 84145 PROCALCITONIN (PCT): CPT

## 2019-10-29 PROCEDURE — 81003 URINALYSIS AUTO W/O SCOPE: CPT

## 2019-10-29 PROCEDURE — 63600175 PHARM REV CODE 636 W HCPCS: Performed by: NURSE PRACTITIONER

## 2019-10-29 PROCEDURE — 85025 COMPLETE CBC W/AUTO DIFF WBC: CPT

## 2019-10-29 PROCEDURE — 93010 ELECTROCARDIOGRAM REPORT: CPT | Mod: ,,, | Performed by: INTERNAL MEDICINE

## 2019-10-29 PROCEDURE — 93005 ELECTROCARDIOGRAM TRACING: CPT

## 2019-10-29 PROCEDURE — 25000242 PHARM REV CODE 250 ALT 637 W/ HCPCS: Performed by: EMERGENCY MEDICINE

## 2019-10-29 PROCEDURE — 27100107 HC POCKET PEAK FLOW METER

## 2019-10-29 PROCEDURE — 94640 AIRWAY INHALATION TREATMENT: CPT

## 2019-10-29 PROCEDURE — 80053 COMPREHEN METABOLIC PANEL: CPT

## 2019-10-29 PROCEDURE — 63600175 PHARM REV CODE 636 W HCPCS: Performed by: EMERGENCY MEDICINE

## 2019-10-29 PROCEDURE — 83880 ASSAY OF NATRIURETIC PEPTIDE: CPT

## 2019-10-29 PROCEDURE — 96375 TX/PRO/DX INJ NEW DRUG ADDON: CPT

## 2019-10-29 RX ORDER — BUDESONIDE 0.5 MG/2ML
0.5 INHALANT ORAL EVERY 12 HOURS
Status: DISCONTINUED | OUTPATIENT
Start: 2019-10-29 | End: 2019-10-31 | Stop reason: HOSPADM

## 2019-10-29 RX ORDER — MAGNESIUM SULFATE HEPTAHYDRATE 40 MG/ML
2 INJECTION, SOLUTION INTRAVENOUS ONCE
Status: COMPLETED | OUTPATIENT
Start: 2019-10-29 | End: 2019-10-29

## 2019-10-29 RX ORDER — ALBUTEROL SULFATE 0.83 MG/ML
2.5 SOLUTION RESPIRATORY (INHALATION)
Status: COMPLETED | OUTPATIENT
Start: 2019-10-29 | End: 2019-10-29

## 2019-10-29 RX ORDER — IPRATROPIUM BROMIDE AND ALBUTEROL SULFATE 2.5; .5 MG/3ML; MG/3ML
3 SOLUTION RESPIRATORY (INHALATION) EVERY 4 HOURS
Status: DISCONTINUED | OUTPATIENT
Start: 2019-10-29 | End: 2019-10-31 | Stop reason: HOSPADM

## 2019-10-29 RX ORDER — IPRATROPIUM BROMIDE AND ALBUTEROL SULFATE 2.5; .5 MG/3ML; MG/3ML
3 SOLUTION RESPIRATORY (INHALATION)
Status: COMPLETED | OUTPATIENT
Start: 2019-10-29 | End: 2019-10-29

## 2019-10-29 RX ORDER — DOXYCYCLINE HYCLATE 100 MG
100 TABLET ORAL EVERY 12 HOURS
Status: DISCONTINUED | OUTPATIENT
Start: 2019-10-29 | End: 2019-10-31 | Stop reason: HOSPADM

## 2019-10-29 RX ORDER — ARFORMOTEROL TARTRATE 15 UG/2ML
15 SOLUTION RESPIRATORY (INHALATION) EVERY 12 HOURS
Status: DISCONTINUED | OUTPATIENT
Start: 2019-10-29 | End: 2019-10-31 | Stop reason: HOSPADM

## 2019-10-29 RX ORDER — HYDROCODONE BITARTRATE AND ACETAMINOPHEN 5; 325 MG/1; MG/1
1 TABLET ORAL EVERY 4 HOURS PRN
Status: DISCONTINUED | OUTPATIENT
Start: 2019-10-29 | End: 2019-10-31 | Stop reason: HOSPADM

## 2019-10-29 RX ORDER — DORZOLAMIDE HYDROCHLORIDE AND TIMOLOL MALEATE 20; 5 MG/ML; MG/ML
1 SOLUTION/ DROPS OPHTHALMIC EVERY 12 HOURS
Status: DISCONTINUED | OUTPATIENT
Start: 2019-10-29 | End: 2019-10-31 | Stop reason: HOSPADM

## 2019-10-29 RX ORDER — FUROSEMIDE 10 MG/ML
60 INJECTION INTRAMUSCULAR; INTRAVENOUS ONCE
Status: COMPLETED | OUTPATIENT
Start: 2019-10-29 | End: 2019-10-29

## 2019-10-29 RX ORDER — PREDNISOLONE ACETATE 10 MG/ML
1 SUSPENSION/ DROPS OPHTHALMIC 4 TIMES DAILY
Status: DISCONTINUED | OUTPATIENT
Start: 2019-10-29 | End: 2019-10-31 | Stop reason: HOSPADM

## 2019-10-29 RX ORDER — METHYLPREDNISOLONE SOD SUCC 125 MG
125 VIAL (EA) INJECTION
Status: COMPLETED | OUTPATIENT
Start: 2019-10-29 | End: 2019-10-29

## 2019-10-29 RX ORDER — CETIRIZINE HYDROCHLORIDE 10 MG/1
10 TABLET ORAL DAILY
Status: DISCONTINUED | OUTPATIENT
Start: 2019-10-29 | End: 2019-10-31 | Stop reason: HOSPADM

## 2019-10-29 RX ORDER — POTASSIUM CHLORIDE 20 MEQ/1
40 TABLET, EXTENDED RELEASE ORAL ONCE
Status: COMPLETED | OUTPATIENT
Start: 2019-10-29 | End: 2019-10-29

## 2019-10-29 RX ORDER — ENOXAPARIN SODIUM 100 MG/ML
40 INJECTION SUBCUTANEOUS EVERY 24 HOURS
Status: DISCONTINUED | OUTPATIENT
Start: 2019-10-29 | End: 2019-10-31 | Stop reason: HOSPADM

## 2019-10-29 RX ORDER — ATROPINE SULFATE 10 MG/ML
1 SOLUTION/ DROPS OPHTHALMIC 2 TIMES DAILY
Status: DISCONTINUED | OUTPATIENT
Start: 2019-10-29 | End: 2019-10-31 | Stop reason: HOSPADM

## 2019-10-29 RX ORDER — LATANOPROST 50 UG/ML
1 SOLUTION/ DROPS OPHTHALMIC NIGHTLY
Status: DISCONTINUED | OUTPATIENT
Start: 2019-10-29 | End: 2019-10-31 | Stop reason: HOSPADM

## 2019-10-29 RX ORDER — KETOROLAC TROMETHAMINE 5 MG/ML
1 SOLUTION OPHTHALMIC 4 TIMES DAILY
Status: DISCONTINUED | OUTPATIENT
Start: 2019-10-29 | End: 2019-10-31 | Stop reason: HOSPADM

## 2019-10-29 RX ADMIN — BUDESONIDE 0.5 MG: 0.5 SUSPENSION RESPIRATORY (INHALATION) at 07:10

## 2019-10-29 RX ADMIN — ARFORMOTEROL TARTRATE 15 MCG: 15 SOLUTION RESPIRATORY (INHALATION) at 07:10

## 2019-10-29 RX ADMIN — DORZOLAMIDE HYDROCHLORIDE AND TIMOLOL MALEATE 1 DROP: 20; 5 SOLUTION/ DROPS OPHTHALMIC at 10:10

## 2019-10-29 RX ADMIN — ALBUTEROL SULFATE 2.5 MG: 2.5 SOLUTION RESPIRATORY (INHALATION) at 01:10

## 2019-10-29 RX ADMIN — IPRATROPIUM BROMIDE AND ALBUTEROL SULFATE 3 ML: .5; 3 SOLUTION RESPIRATORY (INHALATION) at 03:10

## 2019-10-29 RX ADMIN — IPRATROPIUM BROMIDE AND ALBUTEROL SULFATE 3 ML: .5; 3 SOLUTION RESPIRATORY (INHALATION) at 12:10

## 2019-10-29 RX ADMIN — LATANOPROST 1 DROP: 50 SOLUTION OPHTHALMIC at 10:10

## 2019-10-29 RX ADMIN — CETIRIZINE HYDROCHLORIDE 10 MG: 10 TABLET, FILM COATED ORAL at 03:10

## 2019-10-29 RX ADMIN — KETOROLAC TROMETHAMINE 1 DROP: 5 SOLUTION/ DROPS OPHTHALMIC at 10:10

## 2019-10-29 RX ADMIN — IPRATROPIUM BROMIDE AND ALBUTEROL SULFATE 3 ML: .5; 3 SOLUTION RESPIRATORY (INHALATION) at 07:10

## 2019-10-29 RX ADMIN — METHYLPREDNISOLONE SODIUM SUCCINATE 125 MG: 125 INJECTION, POWDER, FOR SOLUTION INTRAMUSCULAR; INTRAVENOUS at 11:10

## 2019-10-29 RX ADMIN — MAGNESIUM SULFATE 2 G: 2 INJECTION INTRAVENOUS at 04:10

## 2019-10-29 RX ADMIN — FUROSEMIDE 60 MG: 10 INJECTION, SOLUTION INTRAMUSCULAR; INTRAVENOUS at 03:10

## 2019-10-29 RX ADMIN — ENOXAPARIN SODIUM 40 MG: 100 INJECTION SUBCUTANEOUS at 04:10

## 2019-10-29 RX ADMIN — DOXYCYCLINE HYCLATE 100 MG: 100 TABLET, COATED ORAL at 04:10

## 2019-10-29 RX ADMIN — PREDNISOLONE ACETATE 1 DROP: 10 SUSPENSION/ DROPS OPHTHALMIC at 10:10

## 2019-10-29 RX ADMIN — ATROPINE SULFATE 1 DROP: 10 SOLUTION OPHTHALMIC at 10:10

## 2019-10-29 RX ADMIN — POTASSIUM CHLORIDE 40 MEQ: 20 TABLET, EXTENDED RELEASE ORAL at 04:10

## 2019-10-29 NOTE — ED PROVIDER NOTES
SCRIBE #1 NOTE: I, Rohit Medeiros, am scribing for, and in the presence of, Berkley Sy DO. I have scribed the entire note.       History     Chief Complaint   Patient presents with    Shortness of Breath     SOB with productive cough x3 weeks. Seen in ED on Saturday.      Review of patient's allergies indicates:  No Known Allergies      History of Present Illness     HPI    10/29/2019, 11:18 AM   History obtained from the patient      History of Present Illness: Jennifer Mcclain is a 49 y.o. female patient with a PMHx of asthma and sarcoidosis who presents to the Emergency Department for evaluation of SOB which onset gradually 3 weeks ago.  Pt states that she had a respiratory tract that triggered her asthma. Pt presented to the ED on 10/26 and was treated for asthma exacerbation, being sent home with a medrol dose pack. Pt reports taking 5 breathing treatments in the past 5.5 hours. Symptoms are constant and moderate in severity, with the pt rating her pain as an 8/10. No mitigating or exacerbating factors reported. Associated sxs include a productive cough, intermittent fever, sore throat, CP, nausea, diarrhea, and leg swelling. Patient denies any HA, dysuria, vomiting, congestion, rhinorrhea, chills, dizziness, recent travel, long car trips, and all other sxs at this time. Prior Tx includes the medrol does pack with minimal sx improvement. No further complaints or concerns at this time.         Arrival mode: Personal vehicle    PCP: Primary Doctor No        Past Medical History:  Past Medical History:   Diagnosis Date    Asthma     Blindness     Glaucoma     Psoriasis     knees and ankles    Sarcoidosis        Past Surgical History:  Past Surgical History:   Procedure Laterality Date    Laser SX Right          Family History:  Family History   Problem Relation Age of Onset    Heart disease Father     Hypertension Maternal Grandmother        Social History:  Social History     Tobacco Use     Smoking status: Never Smoker    Smokeless tobacco: Never Used   Substance and Sexual Activity    Alcohol use: No    Drug use: No    Sexual activity: Unknown        Review of Systems     Review of Systems   Constitutional: Positive for fever (Intermittent). Negative for chills.        (-) Recent travel  (-) Long car trips   HENT: Positive for sore throat. Negative for congestion and rhinorrhea.    Respiratory: Positive for cough (Productive) and shortness of breath.    Cardiovascular: Positive for chest pain and leg swelling.   Gastrointestinal: Positive for diarrhea and nausea. Negative for abdominal pain and vomiting.   Genitourinary: Negative for dysuria.   Musculoskeletal: Negative for back pain, neck pain and neck stiffness.   Skin: Negative for rash and wound.   Neurological: Negative for dizziness, weakness, light-headedness, numbness and headaches.   Hematological: Does not bruise/bleed easily.   All other systems reviewed and are negative.     Physical Exam     Initial Vitals [10/29/19 1055]   BP Pulse Resp Temp SpO2   (!) 146/65 109 (!) 31 98.9 °F (37.2 °C) (!) 93 %      MAP       --          Physical Exam  Nursing Notes and Vital Signs Reviewed.  Constitutional: Patient is in no acute distress. Obese.  Head: Atraumatic. Normocephalic.  Eyes: PERRL. EOM intact. Conjunctivae are not pale. No scleral icterus.  ENT: Mucous membranes are moist. Oropharynx is clear and symmetric.    Neck: Supple. Full ROM. No lymphadenopathy.  Cardiovascular: Regular rate. Regular rhythm. No murmurs, rubs, or gallops. Distal pulses are 2+ and symmetric.  Pulmonary/Chest: Tachypneic. Expiratory wheezing.  Abdominal: Soft and non-distended.  There is no tenderness.  No rebound, guarding, or rigidity. Good bowel sounds.  Genitourinary: No CVA tenderness  Musculoskeletal: Moves all extremities. No obvious deformities. No edema. No calf tenderness.  Skin: Warm and dry.  Neurological:  Alert, awake, and appropriate.  Normal  speech.  No acute focal neurological deficits are appreciated.  Psychiatric: Normal affect. Good eye contact. Appropriate in content.     ED Course   Critical Care  Date/Time: 10/29/2019 3:46 PM  Performed by: Berkley Sy DO  Authorized by: Berkley Sy DO   Direct patient critical care time: 10 minutes  Additional history critical care time: 5 minutes  Ordering / reviewing critical care time: 10 minutes  Documentation critical care time: 5 minutes  Consulting other physicians critical care time: 5 minutes  Total critical care time (exclusive of procedural time) : 35 minutes  Critical care time was exclusive of separately billable procedures and treating other patients and teaching time.  Critical care was necessary to treat or prevent imminent or life-threatening deterioration of the following conditions: respiratory failure (Asthma attack).  Critical care was time spent personally by me on the following activities: blood draw for specimens, development of treatment plan with patient or surrogate, discussions with consultants, interpretation of cardiac output measurements, evaluation of patient's response to treatment, examination of patient, obtaining history from patient or surrogate, ordering and performing treatments and interventions, ordering and review of laboratory studies, ordering and review of radiographic studies, pulse oximetry, re-evaluation of patient's condition and review of old charts.        ED Vital Signs:  Vitals:    10/29/19 1115 10/29/19 1205 10/29/19 1210 10/29/19 1214   BP:       Pulse: 89 87 88 86   Resp:  (!) 22 19 18   Temp:       TempSrc:       SpO2:  97% 100% 100%   Weight: 128.2 kg (282 lb 11.2 oz)      Height:        10/29/19 1300 10/29/19 1315 10/29/19 1513 10/29/19 1520   BP: (!) 164/70  (!) 150/69    Pulse: 102 101 100 95   Resp: (!) 22 (!) 24 20 (!) 28   Temp: 98.7 °F (37.1 °C)      TempSrc: Oral      SpO2: 99% 95% (!) 93% 100%   Weight:       Height:        10/29/19  1600 10/29/19 1657 10/29/19 1800 10/29/19 1830   BP: (!) 188/84 (!) 160/74 (!) 145/67 (!) 140/60   Pulse: (!) 113 107 104 97   Resp: 20 18 20 20   Temp: 97.8 °F (36.6 °C) 97.6 °F (36.4 °C) 98.8 °F (37.1 °C)    TempSrc: Oral Oral Oral    SpO2: (!) 93% 98% 95% (!) 94%   Weight:       Height:        10/29/19 1923 10/29/19 1930 10/29/19 1934   BP: (!) 131/58     Pulse: 95 95 93   Resp: 18 19 19   Temp: 98.6 °F (37 °C)     TempSrc:      SpO2: (!) 94% 98% 98%   Weight:      Height:          Abnormal Lab Results:  Labs Reviewed   CBC W/ AUTO DIFFERENTIAL - Abnormal; Notable for the following components:       Result Value    Hemoglobin 11.4 (*)     Hematocrit 35.5 (*)     RDW 14.6 (*)     All other components within normal limits   COMPREHENSIVE METABOLIC PANEL - Abnormal; Notable for the following components:    Potassium 3.3 (*)     AST 43 (*)     All other components within normal limits   LACTIC ACID, PLASMA - Abnormal; Notable for the following components:    Lactate (Lactic Acid) 3.0 (*)     All other components within normal limits   CULTURE, BLOOD   CULTURE, BLOOD   CULTURE, RESPIRATORY   LACTIC ACID, PLASMA   URINALYSIS, REFLEX TO URINE CULTURE    Narrative:     Preferred Collection Type->Urine, Clean Catch   PROCALCITONIN   B-TYPE NATRIURETIC PEPTIDE        All Lab Results:  Results for orders placed or performed during the hospital encounter of 10/29/19   CBC auto differential   Result Value Ref Range    WBC 8.65 3.90 - 12.70 K/uL    RBC 4.06 4.00 - 5.40 M/uL    Hemoglobin 11.4 (L) 12.0 - 16.0 g/dL    Hematocrit 35.5 (L) 37.0 - 48.5 %    Mean Corpuscular Volume 87 82 - 98 fL    Mean Corpuscular Hemoglobin 28.1 27.0 - 31.0 pg    Mean Corpuscular Hemoglobin Conc 32.1 32.0 - 36.0 g/dL    RDW 14.6 (H) 11.5 - 14.5 %    Platelets 232 150 - 350 K/uL    MPV 11.6 9.2 - 12.9 fL    Immature Granulocytes 0.3 0.0 - 0.5 %    Gran # (ANC) 5.4 1.8 - 7.7 K/uL    Immature Grans (Abs) 0.03 0.00 - 0.04 K/uL    Lymph # 1.8 1.0 - 4.8  K/uL    Mono # 1.0 0.3 - 1.0 K/uL    Eos # 0.4 0.0 - 0.5 K/uL    Baso # 0.02 0.00 - 0.20 K/uL    nRBC 0 0 /100 WBC    Gran% 62.2 38.0 - 73.0 %    Lymph% 21.3 18.0 - 48.0 %    Mono% 11.1 4.0 - 15.0 %    Eosinophil% 4.9 0.0 - 8.0 %    Basophil% 0.2 0.0 - 1.9 %    Differential Method Automated    Comprehensive metabolic panel   Result Value Ref Range    Sodium 142 136 - 145 mmol/L    Potassium 3.3 (L) 3.5 - 5.1 mmol/L    Chloride 103 95 - 110 mmol/L    CO2 28 23 - 29 mmol/L    Glucose 98 70 - 110 mg/dL    BUN, Bld 9 6 - 20 mg/dL    Creatinine 0.9 0.5 - 1.4 mg/dL    Calcium 9.3 8.7 - 10.5 mg/dL    Total Protein 7.0 6.0 - 8.4 g/dL    Albumin 3.7 3.5 - 5.2 g/dL    Total Bilirubin 0.6 0.1 - 1.0 mg/dL    Alkaline Phosphatase 59 55 - 135 U/L    AST 43 (H) 10 - 40 U/L    ALT 27 10 - 44 U/L    Anion Gap 11 8 - 16 mmol/L    eGFR if African American >60 >60 mL/min/1.73 m^2    eGFR if non African American >60 >60 mL/min/1.73 m^2   Lactic acid, plasma #1   Result Value Ref Range    Lactate (Lactic Acid) 1.4 0.5 - 2.2 mmol/L   Lactic acid, plasma #2   Result Value Ref Range    Lactate (Lactic Acid) 3.0 (H) 0.5 - 2.2 mmol/L   Urinalysis, Reflex to Urine Culture Urine, Clean Catch   Result Value Ref Range    Specimen UA Urine, Clean Catch     Color, UA Yellow Yellow, Straw, Evelin    Appearance, UA Clear Clear    pH, UA 6.0 5.0 - 8.0    Specific Gravity, UA 1.015 1.005 - 1.030    Protein, UA Negative Negative    Glucose, UA Negative Negative    Ketones, UA Negative Negative    Bilirubin (UA) Negative Negative    Occult Blood UA Negative Negative    Nitrite, UA Negative Negative    Urobilinogen, UA Negative <2.0 EU/dL    Leukocytes, UA Negative Negative   Procalcitonin   Result Value Ref Range    Procalcitonin 0.02 <0.25 ng/mL   Brain natriuretic peptide   Result Value Ref Range    BNP 11 0 - 99 pg/mL         Imaging Results:  Imaging Results          X-Ray Chest AP Portable (Final result)  Result time 10/29/19 12:08:48    Final result  by Curry Ordoñez MD (10/29/19 12:08:48)                 Impression:      Stable examination.  No acute radiographic process in the chest.      Electronically signed by: Curry Ordoñez  Date:    10/29/2019  Time:    12:08             Narrative:    EXAMINATION:  XR CHEST AP PORTABLE    CLINICAL HISTORY:  Sepsis;    TECHNIQUE:  Single frontal view of the chest was performed.    COMPARISON:  Chest radiograph 10/26/2019 with multiple priors    FINDINGS:  Cardiac leads project over the chest.  Cardiomediastinal silhouette is unchanged enlarged by technique.  No acute or new pulmonary infiltrate.  No significant effusion pneumothorax.  Osseous structures appear intact.                                 The EKG was ordered, reviewed, and independently interpreted by the ED provider.  Interpretation time: 1059  Rate: 93 BPM  Rhythm: normal sinus rhythm  Interpretation: Possible left atrial enlargement. Cannot rule out anterior infarct, age undetermined. No STEMI.           The Emergency Provider reviewed the vital signs and test results, which are outlined above.     ED Discussion     1:01 PM: Re-evaluated pt. Pt is still wheezing after the breathing treatment.  Patient is awake, alert, oriented. No conversational dyspnea.  D/w pt all pertinent results. D/w pt any concerns expressed at this time. Answered all questions. Pt expresses understanding at this time.    1:04 PM: Discussed case with Barber Nieto MD (Hospital Medicine). Dr. Nieto agrees with current care and management of pt and accepts admission.   Admitting Service: Hospital Medicine  Admitting Physician: Barber Nieto MD  Admit to: Observation/Med-Tele       Medical Decision Making:   Clinical Tests:   Lab Tests: Ordered and Reviewed  Radiological Study: Ordered and Reviewed  Medical Tests: Ordered and Reviewed           ED Medication(s):  Medications   atropine 1% ophthalmic solution 1 drop (has no administration in time range)   cetirizine tablet 10 mg  (10 mg Oral Given 10/29/19 1509)   dorzolamide-timolol 2-0.5% ophthalmic solution 1 drop (has no administration in time range)   HYDROcodone-acetaminophen 5-325 mg per tablet 1 tablet (has no administration in time range)   ketorolac 0.5% ophthalmic solution 1 drop (has no administration in time range)   latanoprost 0.005 % ophthalmic solution 1 drop (has no administration in time range)   prednisoLONE acetate 1 % ophthalmic suspension 1 drop (has no administration in time range)   albuterol-ipratropium 2.5 mg-0.5 mg/3 mL nebulizer solution 3 mL (3 mLs Nebulization Given 10/29/19 1934)   methylPREDNISolone sodium succinate injection 80 mg (has no administration in time range)   budesonide nebulizer solution 0.5 mg (0.5 mg Nebulization Given 10/29/19 1934)   arformoterol nebulizer solution 15 mcg (15 mcg Nebulization Given 10/29/19 1930)   doxycycline tablet 100 mg (100 mg Oral Given 10/29/19 1647)   enoxaparin injection 40 mg (40 mg Subcutaneous Given 10/29/19 1646)   methylPREDNISolone sodium succinate injection 125 mg (125 mg Intravenous Given 10/29/19 1156)   albuterol-ipratropium 2.5 mg-0.5 mg/3 mL nebulizer solution 3 mL (3 mLs Nebulization Given 10/29/19 1214)   albuterol nebulizer solution 2.5 mg (2.5 mg Nebulization Given 10/29/19 1315)   furosemide injection 60 mg (60 mg Intravenous Given 10/29/19 1541)   magnesium sulfate 2g in water 50mL IVPB (premix) (2 g Intravenous New Bag 10/29/19 1646)   potassium chloride SA CR tablet 40 mEq (40 mEq Oral Given 10/29/19 1647)       Current Discharge Medication List                  Scribe Attestation:   Scribe #1: I performed the above scribed service and the documentation accurately describes the services I performed. I attest to the accuracy of the note.     Attending:   Physician Attestation Statement for Scribe #1: I, Berkley Sy, DO, personally performed the services described in this documentation, as scribed by Rohit Medeiros, in my presence, and it is  both accurate and complete.           Clinical Impression       ICD-10-CM ICD-9-CM   1. Moderate acute exacerbation of asthma J45.901 493.92   2. SOB (shortness of breath) R06.02 786.05   3. Dyspnea R06.00 786.09       Disposition:   Disposition: Placed in Observation  Condition: Brenda Sy,   10/29/19 2042

## 2019-10-29 NOTE — PROGRESS NOTES
Peakflow done with Pt. Personal goal was 391 based on Pt age and height. Three breaths were done: 150, 170, 160. NP and RN given results.

## 2019-10-29 NOTE — ASSESSMENT & PLAN NOTE
Ramesh due to asthma exacerbation    Cultures pending  Check sputum culture  Doxycyline empirically

## 2019-10-29 NOTE — ED NOTES
Pt reports her SOB has not gotten better from the last time she was here.    Patient moved to ED room 17, patient assisted onto stretcher and changed into a gown. Patient placed on cardiac monitor, continuous pulse oximetry and automatic blood pressure cuff. Bed placed in low locked position, side rails up x 2, call light is within reach of patient or family, orientation to room and explanation of wait provided to family and patient, alarms set and turned on for monitor and pulse ox, awaiting MD evaluation and orders, will continue to monitor.    Patient identifies self as Jennifer Mcclain      LOC: The patient is awake, alert and aware of environment with an appropriate affect, the patient is oriented x 3 and speaking appropriately.  APPEARANCE: Patient resting comfortably and in no acute distress, patient is clean and well groomed, patient's clothing is properly fastened.  SKIN: The skin is warm and dry, color consistent with ethnicity, patient has normal skin turgor and moist mucus membranes, skin intact, no breakdown or bruising noted.  MUSCULOSKELETAL: Patient moving all extremities well, no obvious swelling or deformities noted.  RESPIRATORY: Airway is open and patent, respirations are spontaneous, patient has an increased effort and rate, no accessory muscle use noted.  CARDIAC: Patient has an increased rate, peripheral edema noted, capillary refill < 3 seconds.  ABDOMEN: Soft and non tender to palpation, no distention noted.  NEUROLOGIC: Pt blind, eyes open spontaneously, behavior appropriate to situation, follows commands, facial expression symmetrical, bilateral hand grasp equal and even, purposeful motor response noted, normal sensation in all extremities when touched with a finger.

## 2019-10-29 NOTE — HPI
Jennifer Mcclain is a 49 y.o. female patient with a PMHx of asthma and sarcoidosis who presents to the Emergency Department for evaluation of SOB which onset gradually 3 weeks ago.  Pt states that she had a respiratory tract infection that triggered her asthma. Pt presented to the ED on 10/26 and was treated for asthma exacerbation, being sent home with a medrol dose pack. Pt reports taking 5 breathing treatments in the past 5.5 hours.  Associated sxs include a productive cough, intermittent fever, sore throat, CP with cough, nausea, diarrhea and leg swelling. Patient denies any HA, dysuria, vomiting, congestion, rhinorrhea, chills, dizziness, recent travel, long car trips, and all other sxs at this time. Prior Tx includes the medrol does pack with minimal sx improvement. ED work-up resulted K+ of 3.3 otherwise labs unremarkable. CXR shows mild pulmonary congestion. Patient admitted to observation for asthma exacerbation under the care of Hospital medicine.

## 2019-10-29 NOTE — ED NOTES
Msg'd pharmacy re: pt's eye drops not being available. Advised that pt is being transported to [facility] room 574.

## 2019-10-29 NOTE — H&P
[facility] Hospital Medicine  History & Physical    Patient Name: eJnnifer Mcclain  MRN: [id number]  Admission Date: 10/29/2019  Attending Physician: Barber Nieto MD   Primary Care Provider: Primary Doctor No         Patient information was obtained from patient and ER records.     Subjective:     Principal Problem:Severe asthma with acute exacerbation    Chief Complaint:   Chief Complaint   Patient presents with    Shortness of Breath     SOB with productive cough x3 weeks. Seen in ED on Saturday.         HPI: Jennifer Mcclain is a 49 y.o. female patient with a PMHx of asthma and sarcoidosis who presents to the Emergency Department for evaluation of SOB which onset gradually 3 weeks ago.  Pt states that she had a respiratory tract infection that triggered her asthma. Pt presented to the ED on 10/26 and was treated for asthma exacerbation, being sent home with a medrol dose pack. Pt reports taking 5 breathing treatments in the past 5.5 hours.  Associated sxs include a productive cough, intermittent fever, sore throat, CP with cough, nausea, diarrhea and leg swelling. Patient denies any HA, dysuria, vomiting, congestion, rhinorrhea, chills, dizziness, recent travel, long car trips, and all other sxs at this time. Prior Tx includes the medrol does pack with minimal sx improvement. ED work-up resulted K+ of 3.3 otherwise labs unremarkable. CXR shows mild pulmonary congestion. Patient admitted to observation for asthma exacerbation under the care of Hospital medicine.     Past Medical History:   Diagnosis Date    Asthma     Blindness     Glaucoma     Psoriasis     knees and ankles    Sarcoidosis        Past Surgical History:   Procedure Laterality Date    Laser SX Right        Review of patient's allergies indicates:  No Known Allergies    No current facility-administered medications on file prior to encounter.      Current Outpatient Medications on File Prior to Encounter   Medication Sig     albuterol (PROVENTIL/VENTOLIN HFA) 90 mcg/actuation inhaler Inhale 2 puffs into the lungs every 6 (six) hours as needed for Wheezing.    albuterol (VENTOLIN HFA) 90 mcg/actuation inhaler Inhale 2 puffs into the lungs every 4 (four) hours as needed for Wheezing.    albuterol-ipratropium (DUO-NEB) 2.5 mg-0.5 mg/3 mL nebulizer solution Take 3 mLs by nebulization every 8 (eight) hours as needed for Wheezing. Rescue    atropine 1% (ISOPTO ATROPINE) 1 % Drop Place 1 drop into both eyes 2 (two) times daily.    dorzolamide-timolol 2-0.5% (COSOPT) 22.3-6.8 mg/mL ophthalmic solution Place 1 drop into both eyes every 12 (twelve) hours.    fluticasone-vilanterol (BREO ELLIPTA) 200-25 mcg/dose DsDv diskus inhaler Inhale 1 puff into the lungs once daily. Controller    furosemide (LASIX) 20 MG tablet Take 1 tablet (20 mg total) by mouth every other day.    HYDROcodone-acetaminophen (NORCO) 5-325 mg per tablet Take 1 tablet by mouth every 4 (four) hours as needed for Pain.    ketorolac 0.5% (ACULAR) 0.5 % Drop Place 1 drop into the right eye 4 (four) times daily.    latanoprost 0.005 % ophthalmic solution Place 1 drop into both eyes every evening.    methylPREDNISolone (MEDROL DOSEPACK) 4 mg tablet Taper as directed    prednisoLONE acetate (PRED FORTE) 1 % DrpS Place 1 drop into both eyes 4 (four) times daily.    cetirizine (ZYRTEC) 10 MG tablet Take 1 tablet (10 mg total) by mouth once daily. (Patient taking differently: Take 10 mg by mouth once daily. Patient states that she takes OTC Zyrtec once daily ? strength)     Family History     Problem Relation (Age of Onset)    Heart disease Father    Hypertension Maternal Grandmother        Tobacco Use    Smoking status: Never Smoker    Smokeless tobacco: Never Used   Substance and Sexual Activity    Alcohol use: No    Drug use: No    Sexual activity: Not on file     Review of Systems   Constitutional: Positive for activity change and fever. Negative for appetite  change.   HENT: Positive for sore throat.    Eyes: Negative.    Respiratory: Positive for cough and wheezing.    Cardiovascular: Positive for chest pain (with cough ) and leg swelling.   Gastrointestinal: Positive for diarrhea and nausea. Negative for abdominal pain.   Endocrine: Negative.    Genitourinary: Negative.  Negative for dysuria, frequency and urgency.   Musculoskeletal: Negative.    Skin: Negative.    Allergic/Immunologic: Negative.    Neurological: Negative.  Negative for dizziness, weakness and light-headedness.   Hematological: Negative.    Psychiatric/Behavioral: Negative.      Objective:     Vital Signs (Most Recent):  Temp: 98.7 °F (37.1 °C) (10/29/19 1300)  Pulse: 95 (10/29/19 1520)  Resp: (!) 28 (10/29/19 1520)  BP: (!) 150/69 (10/29/19 1513)  SpO2: 100 % (10/29/19 1520) Vital Signs (24h Range):  Temp:  [98.7 °F (37.1 °C)-98.9 °F (37.2 °C)] 98.7 °F (37.1 °C)  Pulse:  [] 95  Resp:  [18-31] 28  SpO2:  [93 %-100 %] 100 %  BP: (146-164)/(65-70) 150/69     Weight: 128.2 kg (282 lb 11.2 oz)  Body mass index is 50.08 kg/m².    Physical Exam   Constitutional: She is oriented to person, place, and time. She appears well-developed and well-nourished.   Obese    HENT:   Head: Normocephalic and atraumatic.   Eyes: Pupils are equal, round, and reactive to light. Conjunctivae and EOM are normal.   Neck: Normal range of motion. Neck supple.   Cardiovascular: Regular rhythm, normal heart sounds and intact distal pulses. Tachycardia present.   Pulmonary/Chest: No accessory muscle usage. Tachypnea noted. She is in respiratory distress (mild ). She has decreased breath sounds. She has wheezes (diffuse ).   Abdominal: Soft. Bowel sounds are normal.   Musculoskeletal: Normal range of motion. She exhibits edema (trace to BLEs ).   Neurological: She is alert and oriented to person, place, and time. She has normal reflexes.   Skin: Skin is warm and dry.   Psychiatric: She has a normal mood and affect. Her behavior  is normal. Judgment and thought content normal.   Nursing note and vitals reviewed.         Significant Labs:   BMP:   Recent Labs   Lab 10/29/19  1150   GLU 98      K 3.3*      CO2 28   BUN 9   CREATININE 0.9   CALCIUM 9.3     CBC:   Recent Labs   Lab 10/29/19  1150   WBC 8.65   HGB 11.4*   HCT 35.5*        CMP:   Recent Labs   Lab 10/29/19  1150      K 3.3*      CO2 28   GLU 98   BUN 9   CREATININE 0.9   CALCIUM 9.3   PROT 7.0   ALBUMIN 3.7   BILITOT 0.6   ALKPHOS 59   AST 43*   ALT 27   ANIONGAP 11   EGFRNONAA >60     All pertinent labs within the past 24 hours have been reviewed.    Significant Imaging:   Imaging Results          X-Ray Chest AP Portable (Final result)  Result time 10/29/19 12:08:48    Final result by Curry Ordoñez MD (10/29/19 12:08:48)                 Impression:      Stable examination.  No acute radiographic process in the chest.      Electronically signed by: Curry Ordoñez  Date:    10/29/2019  Time:    12:08             Narrative:    EXAMINATION:  XR CHEST AP PORTABLE    CLINICAL HISTORY:  Sepsis;    TECHNIQUE:  Single frontal view of the chest was performed.    COMPARISON:  Chest radiograph 10/26/2019 with multiple priors    FINDINGS:  Cardiac leads project over the chest.  Cardiomediastinal silhouette is unchanged enlarged by technique.  No acute or new pulmonary infiltrate.  No significant effusion pneumothorax.  Osseous structures appear intact.                              Assessment/Plan:     * Severe asthma with acute exacerbation  Likely exacerbated by acute illness +URI symptoms .   -Duoneb tx q4 hours scheduled   -Budesonide and arformoterol added   -IV steroids   -Supplemental oxygen   -Empirically doxycyline   -IV magnesium x 1 dose   -Pulmonology consult       SIRS (systemic inflammatory response syndrome)  Ramesh due to asthma exacerbation    Cultures pending  Check sputum culture  Doxycyline empirically     Acute bronchitis  Same plan as  above       GEN on CPAP  CPAP qhs       Sarcoidosis  Followed by pulmonology outpatient       VTE Risk Mitigation (From admission, onward)         Ordered     enoxaparin injection 40 mg  Daily      10/29/19 1600                   Carla Paige NP  Department of Hospital Medicine   [facility]

## 2019-10-29 NOTE — ASSESSMENT & PLAN NOTE
Likely exacerbated by acute illness +URI symptoms .   -Duoneb tx q4 hours scheduled   -Budesonide and arformoterol added   -IV steroids   -Supplemental oxygen   -Empirically doxycyline   -IV magnesium x 1 dose   -Pulmonology consult

## 2019-10-29 NOTE — SUBJECTIVE & OBJECTIVE
Past Medical History:   Diagnosis Date    Asthma     Blindness     Glaucoma     Psoriasis     knees and ankles    Sarcoidosis        Past Surgical History:   Procedure Laterality Date    Laser SX Right        Review of patient's allergies indicates:  No Known Allergies    No current facility-administered medications on file prior to encounter.      Current Outpatient Medications on File Prior to Encounter   Medication Sig    albuterol (PROVENTIL/VENTOLIN HFA) 90 mcg/actuation inhaler Inhale 2 puffs into the lungs every 6 (six) hours as needed for Wheezing.    albuterol (VENTOLIN HFA) 90 mcg/actuation inhaler Inhale 2 puffs into the lungs every 4 (four) hours as needed for Wheezing.    albuterol-ipratropium (DUO-NEB) 2.5 mg-0.5 mg/3 mL nebulizer solution Take 3 mLs by nebulization every 8 (eight) hours as needed for Wheezing. Rescue    atropine 1% (ISOPTO ATROPINE) 1 % Drop Place 1 drop into both eyes 2 (two) times daily.    dorzolamide-timolol 2-0.5% (COSOPT) 22.3-6.8 mg/mL ophthalmic solution Place 1 drop into both eyes every 12 (twelve) hours.    fluticasone-vilanterol (BREO ELLIPTA) 200-25 mcg/dose DsDv diskus inhaler Inhale 1 puff into the lungs once daily. Controller    furosemide (LASIX) 20 MG tablet Take 1 tablet (20 mg total) by mouth every other day.    HYDROcodone-acetaminophen (NORCO) 5-325 mg per tablet Take 1 tablet by mouth every 4 (four) hours as needed for Pain.    ketorolac 0.5% (ACULAR) 0.5 % Drop Place 1 drop into the right eye 4 (four) times daily.    latanoprost 0.005 % ophthalmic solution Place 1 drop into both eyes every evening.    methylPREDNISolone (MEDROL DOSEPACK) 4 mg tablet Taper as directed    prednisoLONE acetate (PRED FORTE) 1 % DrpS Place 1 drop into both eyes 4 (four) times daily.    cetirizine (ZYRTEC) 10 MG tablet Take 1 tablet (10 mg total) by mouth once daily. (Patient taking differently: Take 10 mg by mouth once daily. Patient states that she takes OTC  Zyrtec once daily ? strength)     Family History     Problem Relation (Age of Onset)    Heart disease Father    Hypertension Maternal Grandmother        Tobacco Use    Smoking status: Never Smoker    Smokeless tobacco: Never Used   Substance and Sexual Activity    Alcohol use: No    Drug use: No    Sexual activity: Not on file     Review of Systems   Constitutional: Positive for activity change and fever. Negative for appetite change.   HENT: Positive for sore throat.    Eyes: Negative.    Respiratory: Positive for cough and wheezing.    Cardiovascular: Positive for chest pain (with cough ) and leg swelling.   Gastrointestinal: Positive for diarrhea and nausea. Negative for abdominal pain.   Endocrine: Negative.    Genitourinary: Negative.  Negative for dysuria, frequency and urgency.   Musculoskeletal: Negative.    Skin: Negative.    Allergic/Immunologic: Negative.    Neurological: Negative.  Negative for dizziness, weakness and light-headedness.   Hematological: Negative.    Psychiatric/Behavioral: Negative.      Objective:     Vital Signs (Most Recent):  Temp: 98.7 °F (37.1 °C) (10/29/19 1300)  Pulse: 95 (10/29/19 1520)  Resp: (!) 28 (10/29/19 1520)  BP: (!) 150/69 (10/29/19 1513)  SpO2: 100 % (10/29/19 1520) Vital Signs (24h Range):  Temp:  [98.7 °F (37.1 °C)-98.9 °F (37.2 °C)] 98.7 °F (37.1 °C)  Pulse:  [] 95  Resp:  [18-31] 28  SpO2:  [93 %-100 %] 100 %  BP: (146-164)/(65-70) 150/69     Weight: 128.2 kg (282 lb 11.2 oz)  Body mass index is 50.08 kg/m².    Physical Exam   Constitutional: She is oriented to person, place, and time. She appears well-developed and well-nourished.   Obese    HENT:   Head: Normocephalic and atraumatic.   Eyes: Pupils are equal, round, and reactive to light. Conjunctivae and EOM are normal.   Neck: Normal range of motion. Neck supple.   Cardiovascular: Regular rhythm, normal heart sounds and intact distal pulses. Tachycardia present.   Pulmonary/Chest: No accessory muscle  usage. Tachypnea noted. She is in respiratory distress (mild ). She has decreased breath sounds. She has wheezes (diffuse ).   Abdominal: Soft. Bowel sounds are normal.   Musculoskeletal: Normal range of motion. She exhibits edema (trace to BLEs ).   Neurological: She is alert and oriented to person, place, and time. She has normal reflexes.   Skin: Skin is warm and dry.   Psychiatric: She has a normal mood and affect. Her behavior is normal. Judgment and thought content normal.   Nursing note and vitals reviewed.         Significant Labs:   BMP:   Recent Labs   Lab 10/29/19  1150   GLU 98      K 3.3*      CO2 28   BUN 9   CREATININE 0.9   CALCIUM 9.3     CBC:   Recent Labs   Lab 10/29/19  1150   WBC 8.65   HGB 11.4*   HCT 35.5*        CMP:   Recent Labs   Lab 10/29/19  1150      K 3.3*      CO2 28   GLU 98   BUN 9   CREATININE 0.9   CALCIUM 9.3   PROT 7.0   ALBUMIN 3.7   BILITOT 0.6   ALKPHOS 59   AST 43*   ALT 27   ANIONGAP 11   EGFRNONAA >60     All pertinent labs within the past 24 hours have been reviewed.    Significant Imaging:   Imaging Results          X-Ray Chest AP Portable (Final result)  Result time 10/29/19 12:08:48    Final result by Crury Ordoñez MD (10/29/19 12:08:48)                 Impression:      Stable examination.  No acute radiographic process in the chest.      Electronically signed by: Curry Ordoñez  Date:    10/29/2019  Time:    12:08             Narrative:    EXAMINATION:  XR CHEST AP PORTABLE    CLINICAL HISTORY:  Sepsis;    TECHNIQUE:  Single frontal view of the chest was performed.    COMPARISON:  Chest radiograph 10/26/2019 with multiple priors    FINDINGS:  Cardiac leads project over the chest.  Cardiomediastinal silhouette is unchanged enlarged by technique.  No acute or new pulmonary infiltrate.  No significant effusion pneumothorax.  Osseous structures appear intact.

## 2019-10-30 LAB
ANION GAP SERPL CALC-SCNC: 13 MMOL/L (ref 8–16)
BASOPHILS # BLD AUTO: 0.01 K/UL (ref 0–0.2)
BASOPHILS NFR BLD: 0.1 % (ref 0–1.9)
BUN SERPL-MCNC: 10 MG/DL (ref 6–20)
CALCIUM SERPL-MCNC: 10.1 MG/DL (ref 8.7–10.5)
CHLORIDE SERPL-SCNC: 103 MMOL/L (ref 95–110)
CO2 SERPL-SCNC: 25 MMOL/L (ref 23–29)
CREAT SERPL-MCNC: 0.8 MG/DL (ref 0.5–1.4)
DIFFERENTIAL METHOD: ABNORMAL
EOSINOPHIL # BLD AUTO: 0 K/UL (ref 0–0.5)
EOSINOPHIL NFR BLD: 0 % (ref 0–8)
ERYTHROCYTE [DISTWIDTH] IN BLOOD BY AUTOMATED COUNT: 14.5 % (ref 11.5–14.5)
EST. GFR  (AFRICAN AMERICAN): >60 ML/MIN/1.73 M^2
EST. GFR  (NON AFRICAN AMERICAN): >60 ML/MIN/1.73 M^2
GLUCOSE SERPL-MCNC: 131 MG/DL (ref 70–110)
HCT VFR BLD AUTO: 39.4 % (ref 37–48.5)
HGB BLD-MCNC: 12 G/DL (ref 12–16)
IMM GRANULOCYTES # BLD AUTO: 0.06 K/UL (ref 0–0.04)
IMM GRANULOCYTES NFR BLD AUTO: 0.5 % (ref 0–0.5)
LYMPHOCYTES # BLD AUTO: 1 K/UL (ref 1–4.8)
LYMPHOCYTES NFR BLD: 8.3 % (ref 18–48)
MCH RBC QN AUTO: 26.9 PG (ref 27–31)
MCHC RBC AUTO-ENTMCNC: 30.5 G/DL (ref 32–36)
MCV RBC AUTO: 88 FL (ref 82–98)
MONOCYTES # BLD AUTO: 0.4 K/UL (ref 0.3–1)
MONOCYTES NFR BLD: 3.1 % (ref 4–15)
NEUTROPHILS # BLD AUTO: 10.6 K/UL (ref 1.8–7.7)
NEUTROPHILS NFR BLD: 88 % (ref 38–73)
NRBC BLD-RTO: 0 /100 WBC
PLATELET # BLD AUTO: 248 K/UL (ref 150–350)
PMV BLD AUTO: 11.8 FL (ref 9.2–12.9)
POTASSIUM SERPL-SCNC: 4.3 MMOL/L (ref 3.5–5.1)
RBC # BLD AUTO: 4.46 M/UL (ref 4–5.4)
SODIUM SERPL-SCNC: 141 MMOL/L (ref 136–145)
WBC # BLD AUTO: 11.99 K/UL (ref 3.9–12.7)

## 2019-10-30 PROCEDURE — 96372 THER/PROPH/DIAG INJ SC/IM: CPT

## 2019-10-30 PROCEDURE — 80048 BASIC METABOLIC PNL TOTAL CA: CPT

## 2019-10-30 PROCEDURE — 94640 AIRWAY INHALATION TREATMENT: CPT

## 2019-10-30 PROCEDURE — 94761 N-INVAS EAR/PLS OXIMETRY MLT: CPT

## 2019-10-30 PROCEDURE — 94660 CPAP INITIATION&MGMT: CPT

## 2019-10-30 PROCEDURE — 85025 COMPLETE CBC W/AUTO DIFF WBC: CPT

## 2019-10-30 PROCEDURE — G0378 HOSPITAL OBSERVATION PER HR: HCPCS

## 2019-10-30 PROCEDURE — 99900035 HC TECH TIME PER 15 MIN (STAT)

## 2019-10-30 PROCEDURE — 25000003 PHARM REV CODE 250: Performed by: NURSE PRACTITIONER

## 2019-10-30 PROCEDURE — 96376 TX/PRO/DX INJ SAME DRUG ADON: CPT

## 2019-10-30 PROCEDURE — 27000190 HC CPAP FULL FACE MASK W/VALVE

## 2019-10-30 PROCEDURE — 99215 PR OFFICE/OUTPT VISIT, EST, LEVL V, 40-54 MIN: ICD-10-PCS | Mod: ,,, | Performed by: INTERNAL MEDICINE

## 2019-10-30 PROCEDURE — 36415 COLL VENOUS BLD VENIPUNCTURE: CPT

## 2019-10-30 PROCEDURE — 63600175 PHARM REV CODE 636 W HCPCS: Performed by: NURSE PRACTITIONER

## 2019-10-30 PROCEDURE — 25000242 PHARM REV CODE 250 ALT 637 W/ HCPCS: Performed by: NURSE PRACTITIONER

## 2019-10-30 PROCEDURE — 96375 TX/PRO/DX INJ NEW DRUG ADDON: CPT

## 2019-10-30 PROCEDURE — 99215 OFFICE O/P EST HI 40 MIN: CPT | Mod: ,,, | Performed by: INTERNAL MEDICINE

## 2019-10-30 RX ORDER — HYDRALAZINE HYDROCHLORIDE 20 MG/ML
10 INJECTION INTRAMUSCULAR; INTRAVENOUS EVERY 8 HOURS PRN
Status: DISCONTINUED | OUTPATIENT
Start: 2019-10-30 | End: 2019-10-31 | Stop reason: HOSPADM

## 2019-10-30 RX ADMIN — ATROPINE SULFATE 1 DROP: 10 SOLUTION OPHTHALMIC at 08:10

## 2019-10-30 RX ADMIN — KETOROLAC TROMETHAMINE 1 DROP: 5 SOLUTION/ DROPS OPHTHALMIC at 10:10

## 2019-10-30 RX ADMIN — ENOXAPARIN SODIUM 40 MG: 100 INJECTION SUBCUTANEOUS at 04:10

## 2019-10-30 RX ADMIN — IPRATROPIUM BROMIDE AND ALBUTEROL SULFATE 3 ML: .5; 3 SOLUTION RESPIRATORY (INHALATION) at 08:10

## 2019-10-30 RX ADMIN — DORZOLAMIDE HYDROCHLORIDE AND TIMOLOL MALEATE 1 DROP: 20; 5 SOLUTION/ DROPS OPHTHALMIC at 10:10

## 2019-10-30 RX ADMIN — HYDRALAZINE HYDROCHLORIDE 10 MG: 20 INJECTION INTRAMUSCULAR; INTRAVENOUS at 12:10

## 2019-10-30 RX ADMIN — BUDESONIDE 0.5 MG: 0.5 SUSPENSION RESPIRATORY (INHALATION) at 07:10

## 2019-10-30 RX ADMIN — IPRATROPIUM BROMIDE AND ALBUTEROL SULFATE 3 ML: .5; 3 SOLUTION RESPIRATORY (INHALATION) at 12:10

## 2019-10-30 RX ADMIN — IPRATROPIUM BROMIDE AND ALBUTEROL SULFATE 3 ML: .5; 3 SOLUTION RESPIRATORY (INHALATION) at 11:10

## 2019-10-30 RX ADMIN — ARFORMOTEROL TARTRATE 15 MCG: 15 SOLUTION RESPIRATORY (INHALATION) at 07:10

## 2019-10-30 RX ADMIN — HYDROCODONE BITARTRATE AND ACETAMINOPHEN 1 TABLET: 5; 325 TABLET ORAL at 04:10

## 2019-10-30 RX ADMIN — DOXYCYCLINE HYCLATE 100 MG: 100 TABLET, COATED ORAL at 10:10

## 2019-10-30 RX ADMIN — METHYLPREDNISOLONE SODIUM SUCCINATE 80 MG: 40 INJECTION, POWDER, FOR SOLUTION INTRAMUSCULAR; INTRAVENOUS at 06:10

## 2019-10-30 RX ADMIN — METHYLPREDNISOLONE SODIUM SUCCINATE 80 MG: 40 INJECTION, POWDER, FOR SOLUTION INTRAMUSCULAR; INTRAVENOUS at 12:10

## 2019-10-30 RX ADMIN — PREDNISOLONE ACETATE 1 DROP: 10 SUSPENSION/ DROPS OPHTHALMIC at 10:10

## 2019-10-30 RX ADMIN — KETOROLAC TROMETHAMINE 1 DROP: 5 SOLUTION/ DROPS OPHTHALMIC at 01:10

## 2019-10-30 RX ADMIN — CETIRIZINE HYDROCHLORIDE 10 MG: 10 TABLET, FILM COATED ORAL at 10:10

## 2019-10-30 RX ADMIN — DORZOLAMIDE HYDROCHLORIDE AND TIMOLOL MALEATE 1 DROP: 20; 5 SOLUTION/ DROPS OPHTHALMIC at 08:10

## 2019-10-30 RX ADMIN — PREDNISOLONE ACETATE 1 DROP: 10 SUSPENSION/ DROPS OPHTHALMIC at 08:10

## 2019-10-30 RX ADMIN — METHYLPREDNISOLONE SODIUM SUCCINATE 80 MG: 40 INJECTION, POWDER, FOR SOLUTION INTRAMUSCULAR; INTRAVENOUS at 11:10

## 2019-10-30 RX ADMIN — LATANOPROST 1 DROP: 50 SOLUTION OPHTHALMIC at 08:10

## 2019-10-30 RX ADMIN — DOXYCYCLINE HYCLATE 100 MG: 100 TABLET, COATED ORAL at 09:10

## 2019-10-30 RX ADMIN — IPRATROPIUM BROMIDE AND ALBUTEROL SULFATE 3 ML: .5; 3 SOLUTION RESPIRATORY (INHALATION) at 07:10

## 2019-10-30 RX ADMIN — IPRATROPIUM BROMIDE AND ALBUTEROL SULFATE 3 ML: .5; 3 SOLUTION RESPIRATORY (INHALATION) at 05:10

## 2019-10-30 RX ADMIN — PREDNISOLONE ACETATE 1 DROP: 10 SUSPENSION/ DROPS OPHTHALMIC at 01:10

## 2019-10-30 RX ADMIN — KETOROLAC TROMETHAMINE 1 DROP: 5 SOLUTION/ DROPS OPHTHALMIC at 08:10

## 2019-10-30 RX ADMIN — PREDNISOLONE ACETATE 1 DROP: 10 SUSPENSION/ DROPS OPHTHALMIC at 04:10

## 2019-10-30 RX ADMIN — IPRATROPIUM BROMIDE AND ALBUTEROL SULFATE 3 ML: .5; 3 SOLUTION RESPIRATORY (INHALATION) at 03:10

## 2019-10-30 RX ADMIN — BUDESONIDE 0.5 MG: 0.5 SUSPENSION RESPIRATORY (INHALATION) at 08:10

## 2019-10-30 RX ADMIN — METHYLPREDNISOLONE SODIUM SUCCINATE 80 MG: 40 INJECTION, POWDER, FOR SOLUTION INTRAMUSCULAR; INTRAVENOUS at 05:10

## 2019-10-30 RX ADMIN — ARFORMOTEROL TARTRATE 15 MCG: 15 SOLUTION RESPIRATORY (INHALATION) at 08:10

## 2019-10-30 RX ADMIN — ATROPINE SULFATE 1 DROP: 10 SOLUTION OPHTHALMIC at 10:10

## 2019-10-30 RX ADMIN — KETOROLAC TROMETHAMINE 1 DROP: 5 SOLUTION/ DROPS OPHTHALMIC at 04:10

## 2019-10-30 NOTE — HPI
49 y o f patient with known hx of blindness, catarcts , sarcoidosis ? Ocular, asthma on Breo 200 mcg at home , moderately severe obstructive lung dz , admitted for poor improvement on po steroids of her worsening coughing , wheezing and sob , likely precipitated by URI 3 weeks ago.

## 2019-10-30 NOTE — HOSPITAL COURSE
Ms Mcclain is 49 year old female who presented to [facility] with increase SOB of gradually onset 3 weeks ago. Patient has history of Asthma and has seen Dr M[place] in [place]. She reports symptoms have started to improved since admission. Patient was evaluated by Pulmonary with recommendations for   IV solumedrol , Duoneb , ICS, and LABA continued.  On 10/31/19, pt verbalized symptom improvement on current therapy.  Pt did not qualify for home oxygen per evaluation results.  No signs of acute distress noted.  Pt seen and examined on the date of discharge and deemed suitable for discharge to home.  Current medications resumed with Prednisone (tapering dose) and Doxycycline prescribed.  Pt instructed to follow up with PCP (referral to establish care) and Pulmonology for further evaluation.

## 2019-10-30 NOTE — SUBJECTIVE & OBJECTIVE
Interval History: Pt seen and examined, symptoms are improving, however she continues to have wheezing and rhonchi. Possible discharge tomorrow.     Review of Systems   Constitutional: Positive for appetite change and fatigue. Negative for chills and fever.   HENT: Negative for congestion, rhinorrhea and sinus pressure.    Eyes:        Blindness    Respiratory: Positive for shortness of breath and wheezing. Negative for apnea, cough, choking, chest tightness and stridor.    Cardiovascular: Negative for chest pain, palpitations and leg swelling.   Gastrointestinal: Negative for abdominal distention, abdominal pain, diarrhea, nausea and vomiting.   Endocrine: Negative for cold intolerance and heat intolerance.   Genitourinary: Negative for difficulty urinating and hematuria.   Musculoskeletal: Negative for arthralgias and joint swelling.   Skin: Negative for color change, pallor and rash.   Neurological: Positive for weakness. Negative for dizziness, seizures, numbness and headaches.   Psychiatric/Behavioral: Negative for agitation. The patient is not nervous/anxious.      Objective:     Vital Signs (Most Recent):  Temp: 98.6 °F (37 °C) (10/30/19 1600)  Pulse: 82 (10/30/19 1600)  Resp: 18 (10/30/19 1600)  BP: (!) 168/75 (10/30/19 1600)  SpO2: 95 % (10/30/19 1600) Vital Signs (24h Range):  Temp:  [97.8 °F (36.6 °C)-98.9 °F (37.2 °C)] 98.6 °F (37 °C)  Pulse:  [] 82  Resp:  [16-20] 18  SpO2:  [93 %-98 %] 95 %  BP: (118-174)/(58-95) 168/75     Weight: 128.2 kg (282 lb 11.2 oz)  Body mass index is 50.08 kg/m².    Intake/Output Summary (Last 24 hours) at 10/30/2019 1752  Last data filed at 10/30/2019 1215  Gross per 24 hour   Intake 600 ml   Output --   Net 600 ml      Physical Exam   Constitutional: No distress.   Eyes: Right eye exhibits no discharge. Left eye exhibits no discharge.   Blindness    Cardiovascular: Exam reveals no gallop and no friction rub.   No murmur heard.  Pulmonary/Chest: No stridor. No  respiratory distress. She has wheezes. She has rhonchi (throughout ). She has no rales. She exhibits no tenderness.   Abdominal: She exhibits no distension and no mass. There is no tenderness. There is no rebound and no guarding.   Musculoskeletal: She exhibits no edema or deformity.   Neurological: She is alert.   Skin: Skin is warm and dry. Capillary refill takes less than 2 seconds. She is not diaphoretic.   Nursing note and vitals reviewed.      Significant Labs:   CBC:   Recent Labs   Lab 10/29/19  1150 10/30/19  0529   WBC 8.65 11.99   HGB 11.4* 12.0   HCT 35.5* 39.4    248     CMP:   Recent Labs   Lab 10/29/19  1150 10/30/19  0529    141   K 3.3* 4.3    103   CO2 28 25   GLU 98 131*   BUN 9 10   CREATININE 0.9 0.8   CALCIUM 9.3 10.1   PROT 7.0  --    ALBUMIN 3.7  --    BILITOT 0.6  --    ALKPHOS 59  --    AST 43*  --    ALT 27  --    ANIONGAP 11 13   EGFRNONAA >60 >60       Significant Imaging:     Imaging Results          X-Ray Chest AP Portable (Final result)  Result time 10/29/19 12:08:48    Final result by Curry Ordoñez MD (10/29/19 12:08:48)                 Impression:      Stable examination.  No acute radiographic process in the chest.      Electronically signed by: Curry Ordoñez  Date:    10/29/2019  Time:    12:08             Narrative:    EXAMINATION:  XR CHEST AP PORTABLE    CLINICAL HISTORY:  Sepsis;    TECHNIQUE:  Single frontal view of the chest was performed.    COMPARISON:  Chest radiograph 10/26/2019 with multiple priors    FINDINGS:  Cardiac leads project over the chest.  Cardiomediastinal silhouette is unchanged enlarged by technique.  No acute or new pulmonary infiltrate.  No significant effusion pneumothorax.  Osseous structures appear intact.

## 2019-10-30 NOTE — PLAN OF CARE
Iv solu-medrol given scheduled q6h. SOB on exertion. NADN. Tolerating RA well. Will continue to monitor.

## 2019-10-30 NOTE — PROGRESS NOTES
[facility] Hospital Medicine  Progress Note    Patient Name: Jennifer Mcclain  MRN: [id number]  Patient Class: OP- Observation   Admission Date: 10/29/2019  Length of Stay: 0 days  Attending Physician: Barber Nieto MD  Primary Care Provider: Primary Doctor No        Subjective:     Principal Problem:Severe asthma with acute exacerbation        HPI:  Jennifer Mcclain is a 49 y.o. female patient with a PMHx of asthma and sarcoidosis who presents to the Emergency Department for evaluation of SOB which onset gradually 3 weeks ago.  Pt states that she had a respiratory tract infection that triggered her asthma. Pt presented to the ED on 10/26 and was treated for asthma exacerbation, being sent home with a medrol dose pack. Pt reports taking 5 breathing treatments in the past 5.5 hours.  Associated sxs include a productive cough, intermittent fever, sore throat, CP with cough, nausea, diarrhea and leg swelling. Patient denies any HA, dysuria, vomiting, congestion, rhinorrhea, chills, dizziness, recent travel, long car trips, and all other sxs at this time. Prior Tx includes the medrol does pack with minimal sx improvement. ED work-up resulted K+ of 3.3 otherwise labs unremarkable. CXR shows mild pulmonary congestion. Patient admitted to observation for asthma exacerbation under the care of Hospital medicine.     Overview/Hospital Course:  Ms Mcclain is 49 year old female who presented to [facility] with increase SOB of gradually onset 3 weeks ago. Patient has history of Asthma and has seen Dr Mota in clinic in the [facility]. She reports symptoms have started to improved since admission. Patient was evaluated by Pulmonary, as well.     Interval History: Pt seen and examined, symptoms are improving, however she continues to have wheezing and rhonchi. Possible discharge tomorrow.     Review of Systems   Constitutional: Positive for appetite change and fatigue. Negative for chills and fever.   HENT: Negative for  congestion, rhinorrhea and sinus pressure.    Eyes:        Blindness    Respiratory: Positive for shortness of breath and wheezing. Negative for apnea, cough, choking, chest tightness and stridor.    Cardiovascular: Negative for chest pain, palpitations and leg swelling.   Gastrointestinal: Negative for abdominal distention, abdominal pain, diarrhea, nausea and vomiting.   Endocrine: Negative for cold intolerance and heat intolerance.   Genitourinary: Negative for difficulty urinating and hematuria.   Musculoskeletal: Negative for arthralgias and joint swelling.   Skin: Negative for color change, pallor and rash.   Neurological: Positive for weakness. Negative for dizziness, seizures, numbness and headaches.   Psychiatric/Behavioral: Negative for agitation. The patient is not nervous/anxious.      Objective:     Vital Signs (Most Recent):  Temp: 98.6 °F (37 °C) (10/30/19 1600)  Pulse: 82 (10/30/19 1600)  Resp: 18 (10/30/19 1600)  BP: (!) 168/75 (10/30/19 1600)  SpO2: 95 % (10/30/19 1600) Vital Signs (24h Range):  Temp:  [97.8 °F (36.6 °C)-98.9 °F (37.2 °C)] 98.6 °F (37 °C)  Pulse:  [] 82  Resp:  [16-20] 18  SpO2:  [93 %-98 %] 95 %  BP: (118-174)/(58-95) 168/75     Weight: 128.2 kg (282 lb 11.2 oz)  Body mass index is 50.08 kg/m².    Intake/Output Summary (Last 24 hours) at 10/30/2019 1752  Last data filed at 10/30/2019 1215  Gross per 24 hour   Intake 600 ml   Output --   Net 600 ml      Physical Exam   Constitutional: No distress.   Eyes: Right eye exhibits no discharge. Left eye exhibits no discharge.   Blindness    Cardiovascular: Exam reveals no gallop and no friction rub.   No murmur heard.  Pulmonary/Chest: No stridor. No respiratory distress. She has wheezes. She has rhonchi (throughout ). She has no rales. She exhibits no tenderness.   Abdominal: She exhibits no distension and no mass. There is no tenderness. There is no rebound and no guarding.   Musculoskeletal: She exhibits no edema or deformity.    Neurological: She is alert.   Skin: Skin is warm and dry. Capillary refill takes less than 2 seconds. She is not diaphoretic.   Nursing note and vitals reviewed.      Significant Labs:   CBC:   Recent Labs   Lab 10/29/19  1150 10/30/19  0529   WBC 8.65 11.99   HGB 11.4* 12.0   HCT 35.5* 39.4    248     CMP:   Recent Labs   Lab 10/29/19  1150 10/30/19  0529    141   K 3.3* 4.3    103   CO2 28 25   GLU 98 131*   BUN 9 10   CREATININE 0.9 0.8   CALCIUM 9.3 10.1   PROT 7.0  --    ALBUMIN 3.7  --    BILITOT 0.6  --    ALKPHOS 59  --    AST 43*  --    ALT 27  --    ANIONGAP 11 13   EGFRNONAA >60 >60       Significant Imaging:     Imaging Results          X-Ray Chest AP Portable (Final result)  Result time 10/29/19 12:08:48    Final result by Curry Ordoñez MD (10/29/19 12:08:48)                 Impression:      Stable examination.  No acute radiographic process in the chest.      Electronically signed by: Curry Ordoñez  Date:    10/29/2019  Time:    12:08             Narrative:    EXAMINATION:  XR CHEST AP PORTABLE    CLINICAL HISTORY:  Sepsis;    TECHNIQUE:  Single frontal view of the chest was performed.    COMPARISON:  Chest radiograph 10/26/2019 with multiple priors    FINDINGS:  Cardiac leads project over the chest.  Cardiomediastinal silhouette is unchanged enlarged by technique.  No acute or new pulmonary infiltrate.  No significant effusion pneumothorax.  Osseous structures appear intact.                                Assessment/Plan:      * Severe asthma with acute exacerbation  Likely exacerbated by acute illness +URI symptoms .   -Duoneb tx q4 hours scheduled   -Budesonide and arformoterol added   -IV steroids   -Supplemental oxygen   -Empirically doxycyline   -IV magnesium x 1 dose   -Pulmonology following       SIRS (systemic inflammatory response syndrome)  Ramesh due to asthma exacerbation    Cultures pending  Check sputum culture  Doxycyline empirically     Acute bronchitis  Same  plan as above       GEN on CPAP  CPAP qhs       Sarcoidosis  Followed by pulmonology outpatient         VTE Risk Mitigation (From admission, onward)         Ordered     enoxaparin injection 40 mg  Daily      10/29/19 1600                      Corey Rose NP  Department of Hospital Medicine   [facility]

## 2019-10-30 NOTE — ASSESSMENT & PLAN NOTE
Likely exacerbated by acute illness +URI symptoms .   -Duoneb tx q4 hours scheduled   -Budesonide and arformoterol added   -IV steroids   -Supplemental oxygen   -Empirically doxycyline   -IV magnesium x 1 dose   -Pulmonology following

## 2019-10-30 NOTE — PLAN OF CARE
10/30/19 1302   Discharge Assessment   Assessment Type Discharge Planning Assessment   Confirmed/corrected address and phone number on facesheet? Yes   Assessment information obtained from? Patient   Prior to hospitilization cognitive status: Alert/Oriented   Prior to hospitalization functional status: Independent   Current cognitive status: Alert/Oriented   Current Functional Status: Independent   Lives With significant other   Able to Return to Prior Arrangements yes   Is patient able to care for self after discharge? Yes   Who are your caregiver(s) and their phone number(s)? Aron Paige (son) [phone]   Patient's perception of discharge disposition home or selfcare   Readmission Within the Last 30 Days no previous admission in last 30 days   Patient currently being followed by outpatient case management? No   Patient currently receives any other outside agency services? No   Equipment Currently Used at Home other (see comments)  (Blind assist cane)   Do you have any problems affording any of your prescribed medications? No   Is the patient taking medications as prescribed? yes   Does the patient have transportation home? Yes   Transportation Anticipated family or friend will provide   Does the patient receive services at the Coumadin Clinic? No   Discharge Plan A Home with family   DME Needed Upon Discharge  none   Patient/Family in Agreement with Plan yes     Met with pt at bedside for DC assessment. Pt now resides in [facility] corrected address and contact information. No significant CM needs identified at this time. [facility] provided a transitional care folder, information on advanced directives, information on pharmacy bedside delivery, and discharge planning begins on admission with contact information for any needs/questions. Pt opted out of bedside medication delivery. Pt asked that her Rxs be sent to [facility] on [address]. Information below.    [address], [place] [address]  [phone]  Kilo Che LMSW 10/30/2019 1:08 PM

## 2019-10-30 NOTE — HOSPITAL COURSE
Seen an dexamined. On room air , partially better , on IV steroids , Duoneb , LABA/ICS inhalation .

## 2019-10-30 NOTE — SUBJECTIVE & OBJECTIVE
Past Medical History:   Diagnosis Date    Asthma     Blindness     Glaucoma     Psoriasis     knees and ankles    Sarcoidosis        Past Surgical History:   Procedure Laterality Date    Laser SX Right        Review of patient's allergies indicates:  No Known Allergies    Family History     Problem Relation (Age of Onset)    Heart disease Father    Hypertension Maternal Grandmother        Tobacco Use    Smoking status: Never Smoker    Smokeless tobacco: Never Used   Substance and Sexual Activity    Alcohol use: No    Drug use: No    Sexual activity: Not on file         Review of Systems   Constitutional: Positive for activity change and fever. Negative for appetite change.   HENT: Positive for sore throat.    Eyes: Negative.    Respiratory: Positive for cough and wheezing.    Cardiovascular: Positive for chest pain (with cough ) and leg swelling.   Gastrointestinal: Positive for diarrhea and nausea. Negative for abdominal pain.   Endocrine: Negative.    Genitourinary: Negative.  Negative for dysuria, frequency and urgency.   Musculoskeletal: Negative.    Skin: Negative.    Allergic/Immunologic: Negative.    Neurological: Negative.  Negative for dizziness, weakness and light-headedness.   Hematological: Negative.    Psychiatric/Behavioral: Negative.      Objective:     Vital Signs (Most Recent):  Temp: 98.9 °F (37.2 °C) (10/30/19 1220)  Pulse: 92 (10/30/19 1342)  Resp: 18 (10/30/19 1220)  BP: (!) 118/58 (10/30/19 1342)  SpO2: (!) 94 % (10/30/19 1220) Vital Signs (24h Range):  Temp:  [97.6 °F (36.4 °C)-98.9 °F (37.2 °C)] 98.9 °F (37.2 °C)  Pulse:  [] 92  Resp:  [16-28] 18  SpO2:  [93 %-100 %] 94 %  BP: (118-188)/(58-95) 118/58     Weight: 128.2 kg (282 lb 11.2 oz)  Body mass index is 50.08 kg/m².      Intake/Output Summary (Last 24 hours) at 10/30/2019 1439  Last data filed at 10/30/2019 1215  Gross per 24 hour   Intake 600 ml   Output --   Net 600 ml       Physical Exam   Constitutional: She is  oriented to person, place, and time. She appears well-developed and well-nourished. No distress.   HENT:   Head: Normocephalic and atraumatic.   Eyes:   Blind    Neck: Neck supple.   Cardiovascular: Normal rate and regular rhythm.   Pulmonary/Chest: Effort normal. No respiratory distress. She has wheezes.   BS decreased bilaterally    Abdominal: Soft. There is no tenderness.   Musculoskeletal: She exhibits no edema.   Neurological: She is alert and oriented to person, place, and time.   Skin: Skin is warm. No pallor.   Psychiatric: She has a normal mood and affect. Her behavior is normal. Judgment and thought content normal.   Nursing note and vitals reviewed.      Vents:  Oxygen Concentration (%): 21 (10/30/19 1100)    Lines/Drains/Airways     Peripheral Intravenous Line                 Peripheral IV - Single Lumen 10/29/19 1150 22 G Left Wrist 1 day                Significant Labs:    CBC/Anemia Profile:  Recent Labs   Lab 10/29/19  1150 10/30/19  0529   WBC 8.65 11.99   HGB 11.4* 12.0   HCT 35.5* 39.4    248   MCV 87 88   RDW 14.6* 14.5        Chemistries:  Recent Labs   Lab 10/29/19  1150 10/30/19  0529    141   K 3.3* 4.3    103   CO2 28 25   BUN 9 10   CREATININE 0.9 0.8   CALCIUM 9.3 10.1   ALBUMIN 3.7  --    PROT 7.0  --    BILITOT 0.6  --    ALKPHOS 59  --    ALT 27  --    AST 43*  --        Spirometry 2016 personally reviewed     Moderately severe obstruction     Significant Imaging:   CXR: I have reviewed all pertinent results/findings within the past 24 hours and my personal findings are:  clear lungs      CT chest 2017 no parenchymal abnormalities

## 2019-10-31 ENCOUNTER — PATIENT OUTREACH (OUTPATIENT)
Dept: PULMONOLOGY | Facility: CLINIC | Age: 49
End: 2019-10-31

## 2019-10-31 VITALS
RESPIRATION RATE: 18 BRPM | OXYGEN SATURATION: 97 % | SYSTOLIC BLOOD PRESSURE: 167 MMHG | WEIGHT: 282.69 LBS | HEART RATE: 92 BPM | DIASTOLIC BLOOD PRESSURE: 81 MMHG | TEMPERATURE: 99 F | BODY MASS INDEX: 50.09 KG/M2 | HEIGHT: 63 IN

## 2019-10-31 DIAGNOSIS — J45.40 MODERATE PERSISTENT ASTHMA WITHOUT COMPLICATION: Primary | Chronic | ICD-10-CM

## 2019-10-31 LAB
ANION GAP SERPL CALC-SCNC: 11 MMOL/L (ref 8–16)
BASOPHILS # BLD AUTO: 0.01 K/UL (ref 0–0.2)
BASOPHILS NFR BLD: 0.1 % (ref 0–1.9)
BUN SERPL-MCNC: 18 MG/DL (ref 6–20)
CALCIUM SERPL-MCNC: 10.2 MG/DL (ref 8.7–10.5)
CHLORIDE SERPL-SCNC: 103 MMOL/L (ref 95–110)
CO2 SERPL-SCNC: 25 MMOL/L (ref 23–29)
CREAT SERPL-MCNC: 0.9 MG/DL (ref 0.5–1.4)
DIFFERENTIAL METHOD: ABNORMAL
EOSINOPHIL # BLD AUTO: 0 K/UL (ref 0–0.5)
EOSINOPHIL NFR BLD: 0 % (ref 0–8)
ERYTHROCYTE [DISTWIDTH] IN BLOOD BY AUTOMATED COUNT: 14.6 % (ref 11.5–14.5)
EST. GFR  (AFRICAN AMERICAN): >60 ML/MIN/1.73 M^2
EST. GFR  (NON AFRICAN AMERICAN): >60 ML/MIN/1.73 M^2
GLUCOSE SERPL-MCNC: 122 MG/DL (ref 70–110)
HCT VFR BLD AUTO: 39.9 % (ref 37–48.5)
HGB BLD-MCNC: 12.6 G/DL (ref 12–16)
IMM GRANULOCYTES # BLD AUTO: 0.13 K/UL (ref 0–0.04)
IMM GRANULOCYTES NFR BLD AUTO: 0.9 % (ref 0–0.5)
LYMPHOCYTES # BLD AUTO: 1.1 K/UL (ref 1–4.8)
LYMPHOCYTES NFR BLD: 8.2 % (ref 18–48)
MCH RBC QN AUTO: 27.8 PG (ref 27–31)
MCHC RBC AUTO-ENTMCNC: 31.6 G/DL (ref 32–36)
MCV RBC AUTO: 88 FL (ref 82–98)
MONOCYTES # BLD AUTO: 0.8 K/UL (ref 0.3–1)
MONOCYTES NFR BLD: 6.1 % (ref 4–15)
NEUTROPHILS # BLD AUTO: 11.7 K/UL (ref 1.8–7.7)
NEUTROPHILS NFR BLD: 84.7 % (ref 38–73)
NRBC BLD-RTO: 0 /100 WBC
PLATELET # BLD AUTO: 276 K/UL (ref 150–350)
PMV BLD AUTO: 12.2 FL (ref 9.2–12.9)
POTASSIUM SERPL-SCNC: 4 MMOL/L (ref 3.5–5.1)
RBC # BLD AUTO: 4.54 M/UL (ref 4–5.4)
SODIUM SERPL-SCNC: 139 MMOL/L (ref 136–145)
WBC # BLD AUTO: 13.82 K/UL (ref 3.9–12.7)

## 2019-10-31 PROCEDURE — 36415 COLL VENOUS BLD VENIPUNCTURE: CPT

## 2019-10-31 PROCEDURE — 96376 TX/PRO/DX INJ SAME DRUG ADON: CPT

## 2019-10-31 PROCEDURE — 94660 CPAP INITIATION&MGMT: CPT

## 2019-10-31 PROCEDURE — 63600175 PHARM REV CODE 636 W HCPCS: Performed by: NURSE PRACTITIONER

## 2019-10-31 PROCEDURE — 25000242 PHARM REV CODE 250 ALT 637 W/ HCPCS: Performed by: NURSE PRACTITIONER

## 2019-10-31 PROCEDURE — 94761 N-INVAS EAR/PLS OXIMETRY MLT: CPT

## 2019-10-31 PROCEDURE — G0378 HOSPITAL OBSERVATION PER HR: HCPCS

## 2019-10-31 PROCEDURE — 25000003 PHARM REV CODE 250: Performed by: NURSE PRACTITIONER

## 2019-10-31 PROCEDURE — 94640 AIRWAY INHALATION TREATMENT: CPT

## 2019-10-31 PROCEDURE — 85025 COMPLETE CBC W/AUTO DIFF WBC: CPT

## 2019-10-31 PROCEDURE — 96374 THER/PROPH/DIAG INJ IV PUSH: CPT | Mod: 59

## 2019-10-31 PROCEDURE — 96375 TX/PRO/DX INJ NEW DRUG ADDON: CPT

## 2019-10-31 PROCEDURE — 80048 BASIC METABOLIC PNL TOTAL CA: CPT

## 2019-10-31 PROCEDURE — 99900035 HC TECH TIME PER 15 MIN (STAT)

## 2019-10-31 RX ORDER — DOXYCYCLINE HYCLATE 100 MG
100 TABLET ORAL EVERY 12 HOURS
Qty: 10 TABLET | Refills: 0 | Status: SHIPPED | OUTPATIENT
Start: 2019-10-31 | End: 2019-11-05

## 2019-10-31 RX ORDER — PREDNISONE 10 MG/1
TABLET ORAL
Qty: 25 TABLET | Refills: 0 | Status: SHIPPED | OUTPATIENT
Start: 2019-11-01 | End: 2021-07-26

## 2019-10-31 RX ADMIN — IPRATROPIUM BROMIDE AND ALBUTEROL SULFATE 3 ML: .5; 3 SOLUTION RESPIRATORY (INHALATION) at 11:10

## 2019-10-31 RX ADMIN — PREDNISOLONE ACETATE 1 DROP: 10 SUSPENSION/ DROPS OPHTHALMIC at 12:10

## 2019-10-31 RX ADMIN — PREDNISOLONE ACETATE 1 DROP: 10 SUSPENSION/ DROPS OPHTHALMIC at 08:10

## 2019-10-31 RX ADMIN — BUDESONIDE 0.5 MG: 0.5 SUSPENSION RESPIRATORY (INHALATION) at 07:10

## 2019-10-31 RX ADMIN — METHYLPREDNISOLONE SODIUM SUCCINATE 80 MG: 40 INJECTION, POWDER, FOR SOLUTION INTRAMUSCULAR; INTRAVENOUS at 01:10

## 2019-10-31 RX ADMIN — ARFORMOTEROL TARTRATE 15 MCG: 15 SOLUTION RESPIRATORY (INHALATION) at 07:10

## 2019-10-31 RX ADMIN — DOXYCYCLINE HYCLATE 100 MG: 100 TABLET, COATED ORAL at 08:10

## 2019-10-31 RX ADMIN — METHYLPREDNISOLONE SODIUM SUCCINATE 80 MG: 40 INJECTION, POWDER, FOR SOLUTION INTRAMUSCULAR; INTRAVENOUS at 06:10

## 2019-10-31 RX ADMIN — ATROPINE SULFATE 1 DROP: 10 SOLUTION OPHTHALMIC at 08:10

## 2019-10-31 RX ADMIN — CETIRIZINE HYDROCHLORIDE 10 MG: 10 TABLET, FILM COATED ORAL at 08:10

## 2019-10-31 RX ADMIN — HYDRALAZINE HYDROCHLORIDE 10 MG: 20 INJECTION INTRAMUSCULAR; INTRAVENOUS at 04:10

## 2019-10-31 RX ADMIN — IPRATROPIUM BROMIDE AND ALBUTEROL SULFATE 3 ML: .5; 3 SOLUTION RESPIRATORY (INHALATION) at 07:10

## 2019-10-31 RX ADMIN — KETOROLAC TROMETHAMINE 1 DROP: 5 SOLUTION/ DROPS OPHTHALMIC at 12:10

## 2019-10-31 RX ADMIN — IPRATROPIUM BROMIDE AND ALBUTEROL SULFATE 3 ML: .5; 3 SOLUTION RESPIRATORY (INHALATION) at 04:10

## 2019-10-31 RX ADMIN — METHYLPREDNISOLONE SODIUM SUCCINATE 80 MG: 40 INJECTION, POWDER, FOR SOLUTION INTRAMUSCULAR; INTRAVENOUS at 12:10

## 2019-10-31 RX ADMIN — KETOROLAC TROMETHAMINE 1 DROP: 5 SOLUTION/ DROPS OPHTHALMIC at 08:10

## 2019-10-31 RX ADMIN — IPRATROPIUM BROMIDE AND ALBUTEROL SULFATE 3 ML: .5; 3 SOLUTION RESPIRATORY (INHALATION) at 12:10

## 2019-10-31 RX ADMIN — DORZOLAMIDE HYDROCHLORIDE AND TIMOLOL MALEATE 1 DROP: 20; 5 SOLUTION/ DROPS OPHTHALMIC at 08:10

## 2019-10-31 NOTE — PROGRESS NOTES
Pt d/c to home. Telemetry removed. D/c instructions given, verbalized understanding. Pt left unit.

## 2019-10-31 NOTE — PLAN OF CARE
MOON notice delivered to patient and signed on 10/30/19 @ 7:18 pm.     10/31/19 1019   LOVETT Message   Medicare Outpatient and Observation Notification regarding financial responsibility Given to patient/caregiver;Explained to patient/caregiver;Signed/date by patient/caregiver   Date LOVETT was signed 10/30/19   Time LOVETT was signed 0718

## 2019-10-31 NOTE — DISCHARGE SUMMARY
[facility]  Hospital Medicine  Discharge Summary      Patient Name: Jennifer Mcclain  MRN: [id number]  Admission Date: 10/29/2019  Hospital Length of Stay: 0 days  Discharge Date and Time: 10/31/2019  1:55 PM  Attending Physician: Dr. Barber Nieto   Discharging Provider: Malika Foreman NP  Primary Care Provider: Primary Doctor No      HPI:   Jennifer Mcclain is a 49 y.o. female patient with a PMHx of asthma and sarcoidosis who presents to the Emergency Department for evaluation of SOB which onset gradually 3 weeks ago.  Pt states that she had a respiratory tract infection that triggered her asthma. Pt presented to the ED on 10/26 and was treated for asthma exacerbation, being sent home with a medrol dose pack. Pt reports taking 5 breathing treatments in the past 5.5 hours.  Associated sxs include a productive cough, intermittent fever, sore throat, CP with cough, nausea, diarrhea and leg swelling. Patient denies any HA, dysuria, vomiting, congestion, rhinorrhea, chills, dizziness, recent travel, long car trips, and all other sxs at this time. Prior Tx includes the medrol does pack with minimal sx improvement. ED work-up resulted K+ of 3.3 otherwise labs unremarkable. CXR shows mild pulmonary congestion. Patient admitted to observation for asthma exacerbation under the care of Hospital medicine.     * No surgery found *      Hospital Course:   Ms Mcclain is 49 year old female who presented to [facility] with increase SOB of gradually onset 3 weeks ago. Patient has history of Asthma and has seen Dr Mota in clinic in the [facility]. She reports symptoms have started to improved since admission. Patient was evaluated by Pulmonary with recommendations for IV solumedrol , Duoneb , ICS, and LABA continued.  On 10/31/19, pt verbalized symptom improvement on current therapy.  Pt did not qualify for home oxygen per evaluation results.  Mild leukocytosis noted in the setting of steroid use.  No signs of acute distress noted.   Pt seen and examined on the date of discharge and deemed suitable for discharge to home.  Current medications resumed with Prednisone (tapering dose) and Doxycycline prescribed.  Pt instructed to follow up with PCP (referral to establish care) and Pulmonology for further evaluation.      Consults:   Consults (From admission, onward)        Status Ordering Provider     Inpatient consult to Pulmonology  Once     Provider:  Carolina Mcdonald MD    Completed THAD ADDISON          Final Active Diagnoses:    Diagnosis Date Noted POA    PRINCIPAL PROBLEM:  Severe asthma with acute exacerbation [J45.901] 02/13/2018 Yes    Acute bronchitis [J20.9] 02/13/2018 Yes    SIRS (systemic inflammatory response syndrome) [R65.10] 02/13/2018 Yes    GEN on CPAP [G47.33, Z99.89] 04/05/2017 Not Applicable    Sarcoidosis [D86.9] 03/26/2017 Yes      Problems Resolved During this Admission:       Discharged Condition: stable    Disposition: Home or Self Care    Follow Up:  Follow-up Information     Mike Mota MD In 1 week.    Specialty:  Pulmonary Disease  Why:  -hospital follow up   Contact information:  [address]  [place] [id number] [phone]             Beulah Leal MD In 3 days.    Specialty:  Internal Medicine  Why:  -hospital follow up   Contact information:  [address] [place] [id number] [phone]                 Patient Instructions:      Notify your health care provider if you experience any of the following:  temperature >100.4     Notify your health care provider if you experience any of the following:  persistent nausea and vomiting or diarrhea     Notify your health care provider if you experience any of the following:  increased confusion or weakness     Notify your health care provider if you experience any of the following:  persistent dizziness, light-headedness, or visual disturbances     Notify your health care provider if you experience any of the following:   difficulty breathing or increased cough     Activity as tolerated       Significant Diagnostic Studies: Labs:   CMP   Recent Labs   Lab 10/31/19  0545      K 4.0      CO2 25   *   BUN 18   CREATININE 0.9   CALCIUM 10.2   ANIONGAP 11   ESTGFRAFRICA >60   EGFRNONAA >60    and CBC   Recent Labs   Lab 10/31/19  0545   WBC 13.82*   HGB 12.6   HCT 39.9          Pending Diagnostic Studies:     None         Medications:  Reconciled Home Medications:      Medication List      START taking these medications    doxycycline 100 MG tablet  Commonly known as:  VIBRA-TABS  Take 1 tablet (100 mg total) by mouth every 12 (twelve) hours. for 5 days     predniSONE 10 MG tablet  Commonly known as:  DELTASONE  Take 4 tablets by mouth daily for 4 days, then take 2 tablets by mouth daily for 3 days, then complete by taking 1 tablet daily for 3 days        CHANGE how you take these medications    albuterol 90 mcg/actuation inhaler  Commonly known as:  VENTOLIN HFA  Inhale 2 puffs into the lungs every 4 (four) hours as needed for Wheezing.  What changed:  Another medication with the same name was removed. Continue taking this medication, and follow the directions you see here.     cetirizine 10 MG tablet  Commonly known as:  ZYRTEC  Take 1 tablet (10 mg total) by mouth once daily.  What changed:  additional instructions        CONTINUE taking these medications    albuterol-ipratropium 2.5 mg-0.5 mg/3 mL nebulizer solution  Commonly known as:  DUO-NEB  Take 3 mLs by nebulization every 8 (eight) hours as needed for Wheezing. Rescue     atropine 1% 1 % Drop  Commonly known as:  ISOPTO ATROPINE  Place 1 drop into both eyes 2 (two) times daily.     fluticasone furoate-vilanterol 200-25 mcg/dose Dsdv diskus inhaler  Commonly known as:  BREO ELLIPTA  Inhale 1 puff into the lungs once daily. Controller     furosemide 20 MG tablet  Commonly known as:  LASIX  Take 1 tablet (20 mg total) by mouth every other day.      HYDROcodone-acetaminophen 5-325 mg per tablet  Commonly known as:  NORCO  Take 1 tablet by mouth every 4 (four) hours as needed for Pain.     ketorolac 0.5% 0.5 % Drop  Commonly known as:  ACULAR  Place 1 drop into the right eye 4 (four) times daily.     latanoprost 0.005 % ophthalmic solution  Place 1 drop into both eyes every evening.     prednisoLONE acetate 1 % Drps  Commonly known as:  PRED FORTE  Place 1 drop into both eyes 4 (four) times daily.        STOP taking these medications    dorzolamide-timolol 2-0.5% 22.3-6.8 mg/mL ophthalmic solution  Commonly known as:  COSOPT     methylPREDNISolone 4 mg tablet  Commonly known as:  MEDROL DOSEPACK            Indwelling Lines/Drains at time of discharge:   Lines/Drains/Airways     None                 Time spent on the discharge of patient: > 37 minutes  Patient was seen and examined on the date of discharge and determined to be suitable for discharge.         Malika Foreman NP  Department of Hospital Medicine  [facility]

## 2019-10-31 NOTE — PLAN OF CARE
Pt found on CPAP and placed on RA per pt request. Pt maintaining saturation. Will continue to monitor.

## 2019-10-31 NOTE — PLAN OF CARE
Home Oxygen Evaluation    Date Performed: 10/31/2019    1) Patient's Home O2 Sat on room air, while at rest: 100%        If O2 sats on room air at rest are 88% or below, patient qualifies. No additional testing needed. Document N/A in steps 2 and 3. If 89% or above, complete steps 2.      2) Patient's O2 Sat on room air while exercisin%        If O2 sats on room air while exercising remain 89% or above patient does not qualify, no further testing needed Document N/A in step 3. If O2 sats on room air while exercising are 88% or below, continue to step 3.      3) Patient's O2 Sat while exercising on O2:          (Must show improvement from #2 for patients to qualify)    If O2 sats improve on oxygen, patient qualifies for portable oxygen. If not, the patient does not qualify.      Pt stated she gets short of breath while exercising, however, her saturation never dropped below 96%.

## 2019-11-03 LAB
BACTERIA BLD CULT: NORMAL
BACTERIA BLD CULT: NORMAL

## 2019-11-04 LAB
ALLENS TEST: ABNORMAL
DELSYS: ABNORMAL
FIO2: 21
HCO3 UR-SCNC: 23.5 MMOL/L (ref 24–28)
MODE: ABNORMAL
PCO2 BLDA: 38.6 MMHG (ref 35–45)
PH SMN: 7.39 [PH] (ref 7.35–7.45)
PO2 BLDA: 81 MMHG (ref 80–100)
POC BE: -1 MMOL/L
POC SATURATED O2: 96 % (ref 95–100)
SAMPLE: ABNORMAL
SITE: ABNORMAL

## 2019-11-27 ENCOUNTER — TELEPHONE (OUTPATIENT)
Dept: PULMONOLOGY | Facility: CLINIC | Age: 49
End: 2019-11-27

## 2019-11-27 NOTE — TELEPHONE ENCOUNTER
Spoke with pt. Pt stated that she moved to [place] and need a order to  oxygen. Message sent to doctor.

## 2019-11-27 NOTE — TELEPHONE ENCOUNTER
----- Message from Avinash Saab sent at 11/27/2019  4:11 PM CST -----  Contact: Pt  Pt called in regards to cancelling orders for oxygen. Pt can be reached at [phone].

## 2019-12-01 DIAGNOSIS — J45.40 MODERATE PERSISTENT ASTHMA WITHOUT COMPLICATION: ICD-10-CM

## 2019-12-02 ENCOUNTER — TELEPHONE (OUTPATIENT)
Dept: PULMONOLOGY | Facility: CLINIC | Age: 49
End: 2019-12-02

## 2019-12-02 RX ORDER — FLUTICASONE FUROATE AND VILANTEROL TRIFENATATE 200; 25 UG/1; UG/1
POWDER RESPIRATORY (INHALATION)
Qty: 60 EACH | Refills: 0 | Status: ON HOLD | OUTPATIENT
Start: 2019-12-02 | End: 2022-03-04 | Stop reason: SDUPTHER

## 2019-12-02 NOTE — TELEPHONE ENCOUNTER
----- Message from Mike Mota MD sent at 12/2/2019  6:19 AM CST -----  Contact: Pt  2 issues  Cannot discontinue medical therapy ( oxygen) is medically necessary and alternative arrangements have not be instituted in [place], needs new order in [place] before we can discontinue  Alternatively if not using oxgyen and feels not needing it will need test validating non qualification: RA sat or ABg, 6MWD or ONSAt with F2F    ----- Message -----  From: Corina Mckeon MA  Sent: 11/27/2019   4:48 PM CST  To: Elizabeth Lejeune, NP, Mike Mota MD    Pt stated that she is moving to [place] and needs a orders canceling oxygen so they can pick it up because she can't bring them with her to [place].   ----- Message -----  From: Avinash Saab  Sent: 11/27/2019   4:11 PM CST  To: Nakul Mckeon Staff    Pt called in regards to cancelling orders for oxygen. Pt can be reached at [phone].

## 2019-12-02 NOTE — TELEPHONE ENCOUNTER
----- Message from Mike Mota MD sent at 12/2/2019  6:11 AM CST -----   Needs office visit with tests  CXR   [place]  6MWD  ONSAT

## 2019-12-12 ENCOUNTER — TELEPHONE (OUTPATIENT)
Dept: PULMONOLOGY | Facility: CLINIC | Age: 49
End: 2019-12-12

## 2019-12-12 NOTE — TELEPHONE ENCOUNTER
Spoke with pt. Pt stated that she moved to [place] and wanted to see about canceling her oxygen order here and getting records transferred to her new doctor out there. I informed pt that she would have to do some testing to get oxygen order canceled. Informed pt that I would send message to Dr. Doyle and call her back.

## 2019-12-12 NOTE — TELEPHONE ENCOUNTER
----- Message from Mike Mota MD sent at 12/12/2019 10:24 AM CST -----  Contact: Self  Cannot have order across state lines  [place] provider would have to order new oxygen  Regarding current oxygen: she should contact DME for new delivery location if they will accept  Thanks    ----- Message -----  From: Corina Mckeon MA  Sent: 12/12/2019  10:10 AM CST  To: Mike Mota MD    Spoke with pt. Explained to her that she would have to do testing to have oxygen order canceled. Pt stated that she moved to [place] and wants oxygen order transferred to [place]. Her appt with new pulmonologist is 3/3/20. Pt stated that she left her oxygen out here when she moved because she didn't know if she could bring it with her. She had not been using it for the past 6 months. Please advise.  ----- Message -----  From: Rain Pierre  Sent: 12/12/2019   8:22 AM CST  To: Nakul Mckeon Staff    Patient requesting a call back regarding her CPAP. She states that she moved out of state and would like to make sure the information was updated with the supply company. Please call patient back at [phone]

## 2019-12-12 NOTE — TELEPHONE ENCOUNTER
----- Message from Rain Pierre sent at 12/12/2019  8:22 AM CST -----  Contact: Self  Patient requesting a call back regarding her CPAP. She states [place] and would like to make sure the information was updated with the supply company. Please call patient back at [phone]

## 2019-12-13 ENCOUNTER — TELEPHONE (OUTPATIENT)
Dept: PULMONOLOGY | Facility: CLINIC | Age: 49
End: 2019-12-13

## 2019-12-13 NOTE — TELEPHONE ENCOUNTER
----- Message from Jenny Metcalf sent at 12/13/2019  8:59 AM CST -----  Contact: Pt  Pt asked that nurse return her call at ([phone])

## 2019-12-18 ENCOUNTER — TELEPHONE (OUTPATIENT)
Dept: PULMONOLOGY | Facility: CLINIC | Age: 49
End: 2019-12-18

## 2019-12-18 NOTE — TELEPHONE ENCOUNTER
Informed pt that Dr. Doyle will cancel oxygen order here once she is set up with oxygen at her new location. Pt verbalized understanding.

## 2019-12-18 NOTE — TELEPHONE ENCOUNTER
Spoke with pt. Pt informed me that she did speak with [employer] and they told her that they needed authorization from Dr. Doyle to  oxygen. Informed pt that I will send  a message and call her back

## 2019-12-18 NOTE — TELEPHONE ENCOUNTER
----- Message from Lisa Francois sent at 12/18/2019  7:50 AM CST -----  Contact: pt  The pt request a return call, no additional info given and can be reached at [phone]///thxMW

## 2019-12-18 NOTE — TELEPHONE ENCOUNTER
----- Message from Mike Mota MD sent at 12/18/2019 11:44 AM CST -----  Contact: pt  I will cancell once have confirmation has oxygen set up at new location  Thanks    Mike Mota MD    ----- Message -----  From: Corina Mckeon MA  Sent: 12/18/2019   9:00 AM CST  To: Mike Mota MD    Spoke with pt. Pt stated that she spoke with [employer] company and they said that you had to send them a request to  oxygen and  that they can't just go pick it up without authorization.   ----- Message -----  From: Lisa Francois  Sent: 12/18/2019   7:50 AM CST  To: Nakul Mckeon Staff    The pt request a return call, no additional info given and can be reached at [phone]///thxMW

## 2020-08-12 NOTE — ASSESSMENT & PLAN NOTE
Improving, monitor  Likley due to asthma exacerbation, however with worsening URI symptoms and Ua findings.   Cultures pending  Check sputum culture  Continue rocephin and azithromycin at this time, consider deescalation or further continuation pending course.        Path Notes (To The Dermatopathologist): Debulking prior to definitive surgical treatment with Mohs surgery. Please evaluate specimen for more deeply invasive disease.

## 2021-01-14 ENCOUNTER — TELEPHONE (OUTPATIENT)
Dept: PULMONOLOGY | Facility: CLINIC | Age: 51
End: 2021-01-14

## 2021-03-17 PROBLEM — R06.02 SOB (SHORTNESS OF BREATH): Status: RESOLVED | Noted: 2019-10-29 | Resolved: 2021-03-17

## 2021-03-17 PROBLEM — J45.901 SEVERE ASTHMA WITH ACUTE EXACERBATION: Status: RESOLVED | Noted: 2018-02-13 | Resolved: 2021-03-17

## 2021-03-17 PROBLEM — J20.9 ACUTE BRONCHITIS: Status: RESOLVED | Noted: 2018-02-13 | Resolved: 2021-03-17

## 2021-03-17 PROBLEM — R65.10 SIRS (SYSTEMIC INFLAMMATORY RESPONSE SYNDROME): Status: RESOLVED | Noted: 2018-02-13 | Resolved: 2021-03-17

## 2021-07-26 ENCOUNTER — HOSPITAL ENCOUNTER (EMERGENCY)
Facility: HOSPITAL | Age: 51
Discharge: HOME | End: 2021-07-26
Attending: EMERGENCY MEDICINE
Payer: MEDICARE

## 2021-07-26 VITALS
RESPIRATION RATE: 18 BRPM | OXYGEN SATURATION: 98 % | BODY MASS INDEX: 46.68 KG/M2 | SYSTOLIC BLOOD PRESSURE: 148 MMHG | TEMPERATURE: 99 F | DIASTOLIC BLOOD PRESSURE: 65 MMHG | HEIGHT: 63 IN | HEART RATE: 102 BPM | WEIGHT: 263.44 LBS

## 2021-07-26 DIAGNOSIS — J45.41 MODERATE PERSISTENT ASTHMA WITH EXACERBATION: Primary | ICD-10-CM

## 2021-07-26 DIAGNOSIS — H54.3 BLINDNESS OF BOTH EYES: ICD-10-CM

## 2021-07-26 DIAGNOSIS — J45.40 MODERATE PERSISTENT ASTHMA WITHOUT COMPLICATION: ICD-10-CM

## 2021-07-26 DIAGNOSIS — R06.02 SHORTNESS OF BREATH: ICD-10-CM

## 2021-07-26 DIAGNOSIS — Z86.2 HISTORY OF SARCOIDOSIS: ICD-10-CM

## 2021-07-26 LAB
ALBUMIN SERPL BCP-MCNC: 3.8 G/DL (ref 3.5–5.2)
ALP SERPL-CCNC: 83 U/L (ref 55–135)
ALT SERPL W/O P-5'-P-CCNC: 65 U/L (ref 10–44)
ANION GAP SERPL CALC-SCNC: 11 MMOL/L (ref 8–16)
AST SERPL-CCNC: 61 U/L (ref 10–40)
BASOPHILS # BLD AUTO: 0.02 K/UL (ref 0–0.2)
BASOPHILS NFR BLD: 0.2 % (ref 0–1.9)
BILIRUB SERPL-MCNC: 0.7 MG/DL (ref 0.1–1)
BNP SERPL-MCNC: <10 PG/ML (ref 0–99)
BUN SERPL-MCNC: 8 MG/DL (ref 6–20)
CALCIUM SERPL-MCNC: 9.5 MG/DL (ref 8.7–10.5)
CHLORIDE SERPL-SCNC: 103 MMOL/L (ref 95–110)
CO2 SERPL-SCNC: 24 MMOL/L (ref 23–29)
CREAT SERPL-MCNC: 0.8 MG/DL (ref 0.5–1.4)
CTP QC/QA: YES
DIFFERENTIAL METHOD: ABNORMAL
EOSINOPHIL # BLD AUTO: 0.4 K/UL (ref 0–0.5)
EOSINOPHIL NFR BLD: 3.2 % (ref 0–8)
ERYTHROCYTE [DISTWIDTH] IN BLOOD BY AUTOMATED COUNT: 14.4 % (ref 11.5–14.5)
EST. GFR  (AFRICAN AMERICAN): >60 ML/MIN/1.73 M^2
EST. GFR  (NON AFRICAN AMERICAN): >60 ML/MIN/1.73 M^2
GLUCOSE SERPL-MCNC: 107 MG/DL (ref 70–110)
HCT VFR BLD AUTO: 37.1 % (ref 37–48.5)
HCV AB SERPL QL IA: NEGATIVE
HEP C VIRUS HOLD SPECIMEN: NORMAL
HGB BLD-MCNC: 11.6 G/DL (ref 12–16)
IMM GRANULOCYTES # BLD AUTO: 0.07 K/UL (ref 0–0.04)
IMM GRANULOCYTES NFR BLD AUTO: 0.6 % (ref 0–0.5)
LYMPHOCYTES # BLD AUTO: 1.2 K/UL (ref 1–4.8)
LYMPHOCYTES NFR BLD: 9.9 % (ref 18–48)
MCH RBC QN AUTO: 27.6 PG (ref 27–31)
MCHC RBC AUTO-ENTMCNC: 31.3 G/DL (ref 32–36)
MCV RBC AUTO: 88 FL (ref 82–98)
MONOCYTES # BLD AUTO: 0.9 K/UL (ref 0.3–1)
MONOCYTES NFR BLD: 7.6 % (ref 4–15)
NEUTROPHILS # BLD AUTO: 9.7 K/UL (ref 1.8–7.7)
NEUTROPHILS NFR BLD: 78.5 % (ref 38–73)
NRBC BLD-RTO: 0 /100 WBC
PLATELET # BLD AUTO: 96 K/UL (ref 150–450)
PMV BLD AUTO: 11.9 FL (ref 9.2–12.9)
POTASSIUM SERPL-SCNC: 4.2 MMOL/L (ref 3.5–5.1)
PROCALCITONIN SERPL IA-MCNC: 0.04 NG/ML
PROT SERPL-MCNC: 7.3 G/DL (ref 6–8.4)
RBC # BLD AUTO: 4.2 M/UL (ref 4–5.4)
SARS-COV-2 RDRP RESP QL NAA+PROBE: NEGATIVE
SODIUM SERPL-SCNC: 138 MMOL/L (ref 136–145)
STOMATOCYTES BLD QL SMEAR: PRESENT
TARGETS BLD QL SMEAR: ABNORMAL
TROPONIN I SERPL DL<=0.01 NG/ML-MCNC: <0.006 NG/ML (ref 0–0.03)
WBC # BLD AUTO: 12.29 K/UL (ref 3.9–12.7)

## 2021-07-26 PROCEDURE — 99285 EMERGENCY DEPT VISIT HI MDM: CPT | Mod: 25

## 2021-07-26 PROCEDURE — 94640 AIRWAY INHALATION TREATMENT: CPT

## 2021-07-26 PROCEDURE — 85025 COMPLETE CBC W/AUTO DIFF WBC: CPT | Performed by: EMERGENCY MEDICINE

## 2021-07-26 PROCEDURE — 86803 HEPATITIS C AB TEST: CPT | Performed by: EMERGENCY MEDICINE

## 2021-07-26 PROCEDURE — 96374 THER/PROPH/DIAG INJ IV PUSH: CPT

## 2021-07-26 PROCEDURE — 25000003 PHARM REV CODE 250: Performed by: EMERGENCY MEDICINE

## 2021-07-26 PROCEDURE — 93005 ELECTROCARDIOGRAM TRACING: CPT

## 2021-07-26 PROCEDURE — 93010 EKG 12-LEAD: ICD-10-PCS | Mod: ,,, | Performed by: INTERNAL MEDICINE

## 2021-07-26 PROCEDURE — 93010 ELECTROCARDIOGRAM REPORT: CPT | Mod: ,,, | Performed by: INTERNAL MEDICINE

## 2021-07-26 PROCEDURE — 83880 ASSAY OF NATRIURETIC PEPTIDE: CPT | Performed by: EMERGENCY MEDICINE

## 2021-07-26 PROCEDURE — 84145 PROCALCITONIN (PCT): CPT | Performed by: EMERGENCY MEDICINE

## 2021-07-26 PROCEDURE — 25000242 PHARM REV CODE 250 ALT 637 W/ HCPCS

## 2021-07-26 PROCEDURE — 80053 COMPREHEN METABOLIC PANEL: CPT | Performed by: EMERGENCY MEDICINE

## 2021-07-26 PROCEDURE — 63600175 PHARM REV CODE 636 W HCPCS: Performed by: EMERGENCY MEDICINE

## 2021-07-26 PROCEDURE — U0002 COVID-19 LAB TEST NON-CDC: HCPCS | Performed by: EMERGENCY MEDICINE

## 2021-07-26 PROCEDURE — 25000242 PHARM REV CODE 250 ALT 637 W/ HCPCS: Performed by: EMERGENCY MEDICINE

## 2021-07-26 PROCEDURE — 84484 ASSAY OF TROPONIN QUANT: CPT | Performed by: EMERGENCY MEDICINE

## 2021-07-26 RX ORDER — METHYLPREDNISOLONE SOD SUCC 125 MG
125 VIAL (EA) INJECTION
Status: COMPLETED | OUTPATIENT
Start: 2021-07-26 | End: 2021-07-26

## 2021-07-26 RX ORDER — PREDNISONE 20 MG/1
40 TABLET ORAL DAILY
Qty: 10 TABLET | Refills: 0 | Status: SHIPPED | OUTPATIENT
Start: 2021-07-26 | End: 2021-07-31

## 2021-07-26 RX ORDER — ACETAMINOPHEN 500 MG
1000 TABLET ORAL
Status: COMPLETED | OUTPATIENT
Start: 2021-07-26 | End: 2021-07-26

## 2021-07-26 RX ORDER — IPRATROPIUM BROMIDE AND ALBUTEROL SULFATE 2.5; .5 MG/3ML; MG/3ML
3 SOLUTION RESPIRATORY (INHALATION)
Status: DISPENSED | OUTPATIENT
Start: 2021-07-26 | End: 2021-07-26

## 2021-07-26 RX ORDER — IPRATROPIUM BROMIDE AND ALBUTEROL SULFATE 2.5; .5 MG/3ML; MG/3ML
3 SOLUTION RESPIRATORY (INHALATION)
Status: DISCONTINUED | OUTPATIENT
Start: 2021-07-26 | End: 2021-07-26

## 2021-07-26 RX ORDER — IPRATROPIUM BROMIDE AND ALBUTEROL SULFATE 2.5; .5 MG/3ML; MG/3ML
SOLUTION RESPIRATORY (INHALATION)
Status: COMPLETED
Start: 2021-07-26 | End: 2021-07-26

## 2021-07-26 RX ORDER — IPRATROPIUM BROMIDE AND ALBUTEROL SULFATE 2.5; .5 MG/3ML; MG/3ML
3 SOLUTION RESPIRATORY (INHALATION)
Status: COMPLETED | OUTPATIENT
Start: 2021-07-26 | End: 2021-07-26

## 2021-07-26 RX ORDER — FLUTICASONE PROPIONATE 50 MCG
1 SPRAY, SUSPENSION (ML) NASAL 2 TIMES DAILY PRN
Qty: 15 G | Refills: 0 | Status: SHIPPED | OUTPATIENT
Start: 2021-07-26

## 2021-07-26 RX ORDER — CETIRIZINE HYDROCHLORIDE 10 MG/1
10 TABLET ORAL DAILY
Qty: 30 TABLET | Refills: 1 | Status: ON HOLD | OUTPATIENT
Start: 2021-07-26 | End: 2022-03-04 | Stop reason: HOSPADM

## 2021-07-26 RX ORDER — ALBUTEROL SULFATE 90 UG/1
1-2 AEROSOL, METERED RESPIRATORY (INHALATION) EVERY 6 HOURS PRN
Qty: 8 G | Refills: 1 | Status: ON HOLD | OUTPATIENT
Start: 2021-07-26 | End: 2022-03-04 | Stop reason: SDUPTHER

## 2021-07-26 RX ADMIN — IPRATROPIUM BROMIDE AND ALBUTEROL SULFATE 3 ML: .5; 3 SOLUTION RESPIRATORY (INHALATION) at 11:07

## 2021-07-26 RX ADMIN — IPRATROPIUM BROMIDE AND ALBUTEROL SULFATE 3 ML: .5; 3 SOLUTION RESPIRATORY (INHALATION) at 07:07

## 2021-07-26 RX ADMIN — ACETAMINOPHEN 1000 MG: 500 TABLET ORAL at 08:07

## 2021-07-26 RX ADMIN — METHYLPREDNISOLONE SODIUM SUCCINATE 125 MG: 125 INJECTION, POWDER, FOR SOLUTION INTRAMUSCULAR; INTRAVENOUS at 08:07

## 2022-03-03 ENCOUNTER — HOSPITAL ENCOUNTER (OUTPATIENT)
Facility: HOSPITAL | Age: 52
Discharge: HOME | End: 2022-03-04
Attending: EMERGENCY MEDICINE | Admitting: INTERNAL MEDICINE
Payer: MEDICARE

## 2022-03-03 DIAGNOSIS — H54.8 LEGAL BLINDNESS: ICD-10-CM

## 2022-03-03 DIAGNOSIS — G47.33 OSA ON CPAP: ICD-10-CM

## 2022-03-03 DIAGNOSIS — R07.9 CHEST PAIN: ICD-10-CM

## 2022-03-03 DIAGNOSIS — R06.02 SOB (SHORTNESS OF BREATH): ICD-10-CM

## 2022-03-03 DIAGNOSIS — J45.901 MODERATE ASTHMA WITH EXACERBATION, UNSPECIFIED WHETHER PERSISTENT: Primary | ICD-10-CM

## 2022-03-03 DIAGNOSIS — J45.40 MODERATE PERSISTENT ASTHMA WITHOUT COMPLICATION: ICD-10-CM

## 2022-03-03 LAB
ALBUMIN SERPL BCP-MCNC: 3.6 G/DL (ref 3.5–5.2)
ALP SERPL-CCNC: 68 U/L (ref 55–135)
ALT SERPL W/O P-5'-P-CCNC: 28 U/L (ref 10–44)
ANION GAP SERPL CALC-SCNC: 12 MMOL/L (ref 8–16)
AST SERPL-CCNC: 22 U/L (ref 10–40)
BASOPHILS # BLD AUTO: 0.02 K/UL (ref 0–0.2)
BASOPHILS NFR BLD: 0.3 % (ref 0–1.9)
BILIRUB SERPL-MCNC: 0.5 MG/DL (ref 0.1–1)
BNP SERPL-MCNC: <10 PG/ML (ref 0–99)
BUN SERPL-MCNC: 12 MG/DL (ref 6–20)
CALCIUM SERPL-MCNC: 9.8 MG/DL (ref 8.7–10.5)
CHLORIDE SERPL-SCNC: 106 MMOL/L (ref 95–110)
CO2 SERPL-SCNC: 23 MMOL/L (ref 23–29)
CREAT SERPL-MCNC: 0.8 MG/DL (ref 0.5–1.4)
CTP QC/QA: YES
CTP QC/QA: YES
DIFFERENTIAL METHOD: ABNORMAL
EOSINOPHIL # BLD AUTO: 0.7 K/UL (ref 0–0.5)
EOSINOPHIL NFR BLD: 11.1 % (ref 0–8)
ERYTHROCYTE [DISTWIDTH] IN BLOOD BY AUTOMATED COUNT: 14.7 % (ref 11.5–14.5)
EST. GFR  (AFRICAN AMERICAN): >60 ML/MIN/1.73 M^2
EST. GFR  (NON AFRICAN AMERICAN): >60 ML/MIN/1.73 M^2
GLUCOSE SERPL-MCNC: 96 MG/DL (ref 70–110)
HCT VFR BLD AUTO: 35.3 % (ref 37–48.5)
HGB BLD-MCNC: 11.1 G/DL (ref 12–16)
IMM GRANULOCYTES # BLD AUTO: 0.01 K/UL (ref 0–0.04)
IMM GRANULOCYTES NFR BLD AUTO: 0.2 % (ref 0–0.5)
LYMPHOCYTES # BLD AUTO: 1.9 K/UL (ref 1–4.8)
LYMPHOCYTES NFR BLD: 29.2 % (ref 18–48)
MCH RBC QN AUTO: 27.4 PG (ref 27–31)
MCHC RBC AUTO-ENTMCNC: 31.4 G/DL (ref 32–36)
MCV RBC AUTO: 87 FL (ref 82–98)
MONOCYTES # BLD AUTO: 0.6 K/UL (ref 0.3–1)
MONOCYTES NFR BLD: 9.6 % (ref 4–15)
NEUTROPHILS # BLD AUTO: 3.3 K/UL (ref 1.8–7.7)
NEUTROPHILS NFR BLD: 49.6 % (ref 38–73)
NRBC BLD-RTO: 0 /100 WBC
PLATELET # BLD AUTO: 219 K/UL (ref 150–450)
PMV BLD AUTO: 11.6 FL (ref 9.2–12.9)
POC MOLECULAR INFLUENZA A AGN: NEGATIVE
POC MOLECULAR INFLUENZA B AGN: NEGATIVE
POTASSIUM SERPL-SCNC: 4.5 MMOL/L (ref 3.5–5.1)
PROT SERPL-MCNC: 6.8 G/DL (ref 6–8.4)
RBC # BLD AUTO: 4.05 M/UL (ref 4–5.4)
SARS-COV-2 RDRP RESP QL NAA+PROBE: NEGATIVE
SODIUM SERPL-SCNC: 141 MMOL/L (ref 136–145)
TROPONIN I SERPL DL<=0.01 NG/ML-MCNC: <0.006 NG/ML (ref 0–0.03)
WBC # BLD AUTO: 6.65 K/UL (ref 3.9–12.7)

## 2022-03-03 PROCEDURE — 63600175 PHARM REV CODE 636 W HCPCS: Performed by: NURSE PRACTITIONER

## 2022-03-03 PROCEDURE — 83880 ASSAY OF NATRIURETIC PEPTIDE: CPT | Performed by: NURSE PRACTITIONER

## 2022-03-03 PROCEDURE — 99285 EMERGENCY DEPT VISIT HI MDM: CPT | Mod: 25

## 2022-03-03 PROCEDURE — 96372 THER/PROPH/DIAG INJ SC/IM: CPT | Mod: 59

## 2022-03-03 PROCEDURE — 63600175 PHARM REV CODE 636 W HCPCS: Performed by: EMERGENCY MEDICINE

## 2022-03-03 PROCEDURE — 85025 COMPLETE CBC W/AUTO DIFF WBC: CPT | Performed by: NURSE PRACTITIONER

## 2022-03-03 PROCEDURE — 93010 EKG 12-LEAD: ICD-10-PCS | Mod: ,,, | Performed by: INTERNAL MEDICINE

## 2022-03-03 PROCEDURE — 84484 ASSAY OF TROPONIN QUANT: CPT | Performed by: NURSE PRACTITIONER

## 2022-03-03 PROCEDURE — 25000242 PHARM REV CODE 250 ALT 637 W/ HCPCS: Performed by: EMERGENCY MEDICINE

## 2022-03-03 PROCEDURE — U0002 COVID-19 LAB TEST NON-CDC: HCPCS | Performed by: EMERGENCY MEDICINE

## 2022-03-03 PROCEDURE — 93005 ELECTROCARDIOGRAM TRACING: CPT

## 2022-03-03 PROCEDURE — 94640 AIRWAY INHALATION TREATMENT: CPT

## 2022-03-03 PROCEDURE — 25000242 PHARM REV CODE 250 ALT 637 W/ HCPCS: Performed by: NURSE PRACTITIONER

## 2022-03-03 PROCEDURE — 80053 COMPREHEN METABOLIC PANEL: CPT | Performed by: NURSE PRACTITIONER

## 2022-03-03 PROCEDURE — 93010 ELECTROCARDIOGRAM REPORT: CPT | Mod: ,,, | Performed by: INTERNAL MEDICINE

## 2022-03-03 PROCEDURE — 96365 THER/PROPH/DIAG IV INF INIT: CPT

## 2022-03-03 PROCEDURE — G0378 HOSPITAL OBSERVATION PER HR: HCPCS

## 2022-03-03 RX ORDER — MAGNESIUM SULFATE 1 G/100ML
1 INJECTION INTRAVENOUS
Status: COMPLETED | OUTPATIENT
Start: 2022-03-03 | End: 2022-03-03

## 2022-03-03 RX ORDER — ALBUTEROL SULFATE 0.83 MG/ML
10 SOLUTION RESPIRATORY (INHALATION) CONTINUOUS
Status: DISPENSED | OUTPATIENT
Start: 2022-03-03 | End: 2022-03-03

## 2022-03-03 RX ORDER — IPRATROPIUM BROMIDE AND ALBUTEROL SULFATE 2.5; .5 MG/3ML; MG/3ML
3 SOLUTION RESPIRATORY (INHALATION)
Status: COMPLETED | OUTPATIENT
Start: 2022-03-03 | End: 2022-03-03

## 2022-03-03 RX ORDER — METHYLPREDNISOLONE SOD SUCC 125 MG
125 VIAL (EA) INJECTION
Status: COMPLETED | OUTPATIENT
Start: 2022-03-03 | End: 2022-03-03

## 2022-03-03 RX ADMIN — IPRATROPIUM BROMIDE AND ALBUTEROL SULFATE 3 ML: 2.5; .5 SOLUTION RESPIRATORY (INHALATION) at 06:03

## 2022-03-03 RX ADMIN — METHYLPREDNISOLONE SODIUM SUCCINATE 125 MG: 125 INJECTION, POWDER, FOR SOLUTION INTRAMUSCULAR; INTRAVENOUS at 07:03

## 2022-03-03 RX ADMIN — ALBUTEROL SULFATE 10 MG: 2.5 SOLUTION RESPIRATORY (INHALATION) at 08:03

## 2022-03-03 RX ADMIN — MAGNESIUM SULFATE 1 G: 1 INJECTION INTRAVENOUS at 08:03

## 2022-03-04 VITALS
SYSTOLIC BLOOD PRESSURE: 133 MMHG | HEIGHT: 63 IN | RESPIRATION RATE: 22 BRPM | TEMPERATURE: 98 F | WEIGHT: 254.44 LBS | DIASTOLIC BLOOD PRESSURE: 72 MMHG | OXYGEN SATURATION: 98 % | BODY MASS INDEX: 45.08 KG/M2 | HEART RATE: 77 BPM

## 2022-03-04 LAB
ALBUMIN SERPL BCP-MCNC: 3.5 G/DL (ref 3.5–5.2)
ANION GAP SERPL CALC-SCNC: 12 MMOL/L (ref 8–16)
BASOPHILS # BLD AUTO: 0.02 K/UL (ref 0–0.2)
BASOPHILS NFR BLD: 0.2 % (ref 0–1.9)
BUN SERPL-MCNC: 16 MG/DL (ref 6–20)
CALCIUM SERPL-MCNC: 9.9 MG/DL (ref 8.7–10.5)
CHLORIDE SERPL-SCNC: 105 MMOL/L (ref 95–110)
CO2 SERPL-SCNC: 23 MMOL/L (ref 23–29)
CREAT SERPL-MCNC: 1 MG/DL (ref 0.5–1.4)
DIFFERENTIAL METHOD: ABNORMAL
EOSINOPHIL # BLD AUTO: 0.1 K/UL (ref 0–0.5)
EOSINOPHIL NFR BLD: 0.6 % (ref 0–8)
ERYTHROCYTE [DISTWIDTH] IN BLOOD BY AUTOMATED COUNT: 14.9 % (ref 11.5–14.5)
EST. GFR  (AFRICAN AMERICAN): >60 ML/MIN/1.73 M^2
EST. GFR  (NON AFRICAN AMERICAN): >60 ML/MIN/1.73 M^2
GLUCOSE SERPL-MCNC: 170 MG/DL (ref 70–110)
HCT VFR BLD AUTO: 35.7 % (ref 37–48.5)
HGB BLD-MCNC: 11.1 G/DL (ref 12–16)
IGE SERPL-ACNC: 85 IU/ML (ref 0–100)
IMM GRANULOCYTES # BLD AUTO: 0.04 K/UL (ref 0–0.04)
IMM GRANULOCYTES NFR BLD AUTO: 0.5 % (ref 0–0.5)
LYMPHOCYTES # BLD AUTO: 0.7 K/UL (ref 1–4.8)
LYMPHOCYTES NFR BLD: 8.2 % (ref 18–48)
MCH RBC QN AUTO: 27.2 PG (ref 27–31)
MCHC RBC AUTO-ENTMCNC: 31.1 G/DL (ref 32–36)
MCV RBC AUTO: 88 FL (ref 82–98)
MONOCYTES # BLD AUTO: 0.1 K/UL (ref 0.3–1)
MONOCYTES NFR BLD: 1.7 % (ref 4–15)
NEUTROPHILS # BLD AUTO: 7.1 K/UL (ref 1.8–7.7)
NEUTROPHILS NFR BLD: 88.8 % (ref 38–73)
NRBC BLD-RTO: 0 /100 WBC
PHOSPHATE SERPL-MCNC: 3.5 MG/DL (ref 2.7–4.5)
PLATELET # BLD AUTO: 216 K/UL (ref 150–450)
PMV BLD AUTO: 11.6 FL (ref 9.2–12.9)
POTASSIUM SERPL-SCNC: 4.2 MMOL/L (ref 3.5–5.1)
RBC # BLD AUTO: 4.08 M/UL (ref 4–5.4)
SODIUM SERPL-SCNC: 140 MMOL/L (ref 136–145)
WBC # BLD AUTO: 8.03 K/UL (ref 3.9–12.7)

## 2022-03-04 PROCEDURE — 25000242 PHARM REV CODE 250 ALT 637 W/ HCPCS: Performed by: INTERNAL MEDICINE

## 2022-03-04 PROCEDURE — 94640 AIRWAY INHALATION TREATMENT: CPT

## 2022-03-04 PROCEDURE — 99214 PR OFFICE/OUTPT VISIT, EST, LEVL IV, 30-39 MIN: ICD-10-PCS | Mod: ,,, | Performed by: INTERNAL MEDICINE

## 2022-03-04 PROCEDURE — 99900035 HC TECH TIME PER 15 MIN (STAT)

## 2022-03-04 PROCEDURE — 36415 COLL VENOUS BLD VENIPUNCTURE: CPT | Performed by: INTERNAL MEDICINE

## 2022-03-04 PROCEDURE — 25000003 PHARM REV CODE 250: Performed by: INTERNAL MEDICINE

## 2022-03-04 PROCEDURE — 96375 TX/PRO/DX INJ NEW DRUG ADDON: CPT

## 2022-03-04 PROCEDURE — 27000190 HC CPAP FULL FACE MASK W/VALVE

## 2022-03-04 PROCEDURE — 99214 OFFICE O/P EST MOD 30 MIN: CPT | Mod: ,,, | Performed by: INTERNAL MEDICINE

## 2022-03-04 PROCEDURE — 94761 N-INVAS EAR/PLS OXIMETRY MLT: CPT

## 2022-03-04 PROCEDURE — G0378 HOSPITAL OBSERVATION PER HR: HCPCS

## 2022-03-04 PROCEDURE — 63600175 PHARM REV CODE 636 W HCPCS: Performed by: INTERNAL MEDICINE

## 2022-03-04 PROCEDURE — 85025 COMPLETE CBC W/AUTO DIFF WBC: CPT | Performed by: INTERNAL MEDICINE

## 2022-03-04 PROCEDURE — 96372 THER/PROPH/DIAG INJ SC/IM: CPT | Performed by: INTERNAL MEDICINE

## 2022-03-04 PROCEDURE — 94660 CPAP INITIATION&MGMT: CPT

## 2022-03-04 PROCEDURE — 82785 ASSAY OF IGE: CPT | Performed by: INTERNAL MEDICINE

## 2022-03-04 PROCEDURE — 80069 RENAL FUNCTION PANEL: CPT | Performed by: INTERNAL MEDICINE

## 2022-03-04 RX ORDER — TALC
6 POWDER (GRAM) TOPICAL NIGHTLY PRN
Status: DISCONTINUED | OUTPATIENT
Start: 2022-03-04 | End: 2022-03-04 | Stop reason: HOSPADM

## 2022-03-04 RX ORDER — GLUCAGON 1 MG
1 KIT INJECTION
Status: DISCONTINUED | OUTPATIENT
Start: 2022-03-04 | End: 2022-03-04 | Stop reason: HOSPADM

## 2022-03-04 RX ORDER — ARFORMOTEROL TARTRATE 15 UG/2ML
15 SOLUTION RESPIRATORY (INHALATION) 2 TIMES DAILY
Status: DISCONTINUED | OUTPATIENT
Start: 2022-03-04 | End: 2022-03-04 | Stop reason: HOSPADM

## 2022-03-04 RX ORDER — FLUTICASONE FUROATE AND VILANTEROL TRIFENATATE 200; 25 UG/1; UG/1
POWDER RESPIRATORY (INHALATION)
Qty: 60 EACH | Refills: 0 | Status: SHIPPED | OUTPATIENT
Start: 2022-03-04

## 2022-03-04 RX ORDER — SIMETHICONE 80 MG
1 TABLET,CHEWABLE ORAL 4 TIMES DAILY PRN
Status: DISCONTINUED | OUTPATIENT
Start: 2022-03-04 | End: 2022-03-04 | Stop reason: HOSPADM

## 2022-03-04 RX ORDER — ALBUTEROL SULFATE 90 UG/1
1-2 AEROSOL, METERED RESPIRATORY (INHALATION) EVERY 6 HOURS PRN
Qty: 8 G | Refills: 1 | OUTPATIENT
Start: 2022-03-04 | End: 2022-03-04 | Stop reason: SDUPTHER

## 2022-03-04 RX ORDER — FORMOTEROL FUMARATE DIHYDRATE 20 UG/2ML
20 SOLUTION RESPIRATORY (INHALATION) EVERY 12 HOURS
Status: DISCONTINUED | OUTPATIENT
Start: 2022-03-04 | End: 2022-03-04 | Stop reason: CLARIF

## 2022-03-04 RX ORDER — PREDNISONE 20 MG/1
40 TABLET ORAL DAILY
Qty: 10 TABLET | Refills: 0 | Status: SHIPPED | OUTPATIENT
Start: 2022-03-04 | End: 2022-03-09

## 2022-03-04 RX ORDER — POLYETHYLENE GLYCOL 3350 17 G/17G
17 POWDER, FOR SOLUTION ORAL DAILY
Status: DISCONTINUED | OUTPATIENT
Start: 2022-03-04 | End: 2022-03-04 | Stop reason: HOSPADM

## 2022-03-04 RX ORDER — BUDESONIDE 0.5 MG/2ML
0.5 INHALANT ORAL EVERY 12 HOURS
Status: DISCONTINUED | OUTPATIENT
Start: 2022-03-04 | End: 2022-03-04 | Stop reason: HOSPADM

## 2022-03-04 RX ORDER — ONDANSETRON 8 MG/1
8 TABLET, ORALLY DISINTEGRATING ORAL EVERY 8 HOURS PRN
Status: DISCONTINUED | OUTPATIENT
Start: 2022-03-04 | End: 2022-03-04 | Stop reason: HOSPADM

## 2022-03-04 RX ORDER — MONTELUKAST SODIUM 10 MG/1
10 TABLET ORAL NIGHTLY
Qty: 30 TABLET | Refills: 0 | Status: SHIPPED | OUTPATIENT
Start: 2022-03-04 | End: 2022-03-14 | Stop reason: SDUPTHER

## 2022-03-04 RX ORDER — IBUPROFEN 200 MG
24 TABLET ORAL
Status: DISCONTINUED | OUTPATIENT
Start: 2022-03-04 | End: 2022-03-04 | Stop reason: HOSPADM

## 2022-03-04 RX ORDER — PROMETHAZINE HYDROCHLORIDE 25 MG/1
25 TABLET ORAL EVERY 6 HOURS PRN
Status: DISCONTINUED | OUTPATIENT
Start: 2022-03-04 | End: 2022-03-04 | Stop reason: HOSPADM

## 2022-03-04 RX ORDER — KETOROLAC TROMETHAMINE 5 MG/ML
1 SOLUTION OPHTHALMIC 4 TIMES DAILY
Status: DISCONTINUED | OUTPATIENT
Start: 2022-03-04 | End: 2022-03-04 | Stop reason: HOSPADM

## 2022-03-04 RX ORDER — ALBUTEROL SULFATE 0.83 MG/ML
2.5 SOLUTION RESPIRATORY (INHALATION) EVERY 4 HOURS PRN
Status: DISCONTINUED | OUTPATIENT
Start: 2022-03-04 | End: 2022-03-04 | Stop reason: HOSPADM

## 2022-03-04 RX ORDER — IBUPROFEN 200 MG
16 TABLET ORAL
Status: DISCONTINUED | OUTPATIENT
Start: 2022-03-04 | End: 2022-03-04 | Stop reason: HOSPADM

## 2022-03-04 RX ORDER — ACETAMINOPHEN 325 MG/1
650 TABLET ORAL EVERY 4 HOURS PRN
Status: DISCONTINUED | OUTPATIENT
Start: 2022-03-04 | End: 2022-03-04 | Stop reason: HOSPADM

## 2022-03-04 RX ORDER — MAG HYDROX/ALUMINUM HYD/SIMETH 200-200-20
30 SUSPENSION, ORAL (FINAL DOSE FORM) ORAL 4 TIMES DAILY PRN
Status: DISCONTINUED | OUTPATIENT
Start: 2022-03-04 | End: 2022-03-04 | Stop reason: HOSPADM

## 2022-03-04 RX ORDER — LANOLIN ALCOHOL/MO/W.PET/CERES
400 CREAM (GRAM) TOPICAL DAILY
Status: DISCONTINUED | OUTPATIENT
Start: 2022-03-04 | End: 2022-03-04 | Stop reason: HOSPADM

## 2022-03-04 RX ORDER — CETIRIZINE HYDROCHLORIDE 10 MG/1
10 TABLET ORAL DAILY
Status: DISCONTINUED | OUTPATIENT
Start: 2022-03-04 | End: 2022-03-04 | Stop reason: HOSPADM

## 2022-03-04 RX ORDER — SODIUM CHLORIDE 0.9 % (FLUSH) 0.9 %
10 SYRINGE (ML) INJECTION EVERY 8 HOURS PRN
Status: DISCONTINUED | OUTPATIENT
Start: 2022-03-04 | End: 2022-03-04 | Stop reason: HOSPADM

## 2022-03-04 RX ORDER — ALBUTEROL SULFATE 90 UG/1
1-2 AEROSOL, METERED RESPIRATORY (INHALATION) EVERY 6 HOURS PRN
Qty: 8.5 G | Refills: 1 | Status: SHIPPED | OUTPATIENT
Start: 2022-03-04 | End: 2023-03-04

## 2022-03-04 RX ORDER — ENOXAPARIN SODIUM 100 MG/ML
40 INJECTION SUBCUTANEOUS EVERY 12 HOURS
Status: DISCONTINUED | OUTPATIENT
Start: 2022-03-04 | End: 2022-03-04 | Stop reason: HOSPADM

## 2022-03-04 RX ORDER — PREDNISOLONE ACETATE 10 MG/ML
1 SUSPENSION/ DROPS OPHTHALMIC 4 TIMES DAILY
Status: DISCONTINUED | OUTPATIENT
Start: 2022-03-04 | End: 2022-03-04 | Stop reason: HOSPADM

## 2022-03-04 RX ORDER — NALOXONE HCL 0.4 MG/ML
0.02 VIAL (ML) INJECTION
Status: DISCONTINUED | OUTPATIENT
Start: 2022-03-04 | End: 2022-03-04 | Stop reason: HOSPADM

## 2022-03-04 RX ADMIN — ALBUTEROL SULFATE 2.5 MG: 2.5 SOLUTION RESPIRATORY (INHALATION) at 12:03

## 2022-03-04 RX ADMIN — CETIRIZINE HYDROCHLORIDE 10 MG: 10 TABLET, FILM COATED ORAL at 09:03

## 2022-03-04 RX ADMIN — BUDESONIDE 0.5 MG: 0.5 INHALANT ORAL at 07:03

## 2022-03-04 RX ADMIN — ALBUTEROL SULFATE 2.5 MG: 2.5 SOLUTION RESPIRATORY (INHALATION) at 01:03

## 2022-03-04 RX ADMIN — ENOXAPARIN SODIUM 40 MG: 100 INJECTION SUBCUTANEOUS at 09:03

## 2022-03-04 RX ADMIN — Medication 400 MG: at 09:03

## 2022-03-04 RX ADMIN — POLYETHYLENE GLYCOL 3350 17 G: 17 POWDER, FOR SOLUTION ORAL at 09:03

## 2022-03-04 RX ADMIN — METHYLPREDNISOLONE SODIUM SUCCINATE 80 MG: 40 INJECTION, POWDER, FOR SOLUTION INTRAMUSCULAR; INTRAVENOUS at 06:03

## 2022-03-04 RX ADMIN — ALBUTEROL SULFATE 2.5 MG: 2.5 SOLUTION RESPIRATORY (INHALATION) at 07:03

## 2022-03-04 RX ADMIN — ARFORMOTEROL TARTRATE 15 MCG: 15 SOLUTION RESPIRATORY (INHALATION) at 07:03

## 2022-03-04 NOTE — DISCHARGE SUMMARY
'[facility] ([facility] Medicine  Discharge Summary      Patient Name: Jennifer Mcclain  MRN: [id number]  Patient Class: OP- Observation  Admission Date: 3/3/2022  Hospital Length of Stay: 0 days  Discharge Date and Time:  03/04/2022 2:39 PM  Attending Physician: Josselyn Martinez MD   Discharging Provider: Ashleigh West NP  Primary Care Provider: Primary Doctor No      HPI:   Ms. Mcclain came to the ED for chest tightness, shortness of breath, and wheezing over the last two days.  She has a history of asthma and GEN.  She is currently visiting her sons in [place] but lives in [place].  She previously lived in [place] and was being seen by Dr. Mota.  She uses daily scheduled and as needed inhalers but no supplemental oxygen.  She was diagnosed with sleep apnea and nocturnal hypoxemia previously.  She denies chest pain but her symptoms worsen on exertion.  No noted weight changes or leg welling.      * No surgery found *      Hospital Course:   Patient was placed into observation for asthma exacerbation secondary to environmental changes from recent relocation from [place]. Was given scheduled budesonide and formoterol treatments, albuterol PRN, and methylprednisone IV q 8 hours. Labs and Vitals reviewed. Patient had no acute events overnight. Home O2 evaluation completed this morning with room air o2 sats 98% at rest with drop to 92% with activity. Patient is stable and to f/u outpatient. Pulmonology on 3/7 as scheduled with Dr. Mcdonald. Referral placed with [facility] NP at home for follow up given blindness and transportation issues.  D/c home with prednisone x 5 days, Singulair, albuterol and breo ellipta.        Goals of Care Treatment Preferences:  Code Status: Full Code      Consults:   Consults (From admission, onward)        Status Ordering Provider     Inpatient consult to Pulmonology  Once        Provider:  Mike Mota MD    Completed SCARLETT MONET.          * Moderate  persistent asthma without complication  Symptoms most suggestive of asthma exacerbation.  She traveled her from [place] in the last couple weeks - Asthma could have been triggered by environmental change.  Scheduled Budesonide and Formoterol nebulizer treatments with Albuterol treatments as needed.  Methylprednisolone 80 mg every 8 hours.  Consult to pulmonary Medicine for assistance optimizing care.    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    GEN on CPAP  CPAP nightly with pressure 10 and 40% fiO2.    03/04/2022  Continue CPAP HS  F/u pulm outpatient    Diastolic dysfunction  BNP is normal without signs of peripheral edema.  Troponin normal and no signs of ischemia on EKG.    Sarcoidosis  Uncertain if there is pulmonary involvement.  No nodules on her last CT of the chest and ACE level has been normal.  Discuss with Pulmonary medicine.    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    Why: As Scheduled           Primary Care Provider Follow up in 1 week(s).    Why: As Scheduled                     Patient Instructions:      Ambulatory referral/consult to Pulmonology   Standing Status: Future   Referral Priority: Routine Referral Type:  Consultation   Referral Reason: Specialty Services Required   Requested Specialty: Pulmonary Disease   Number of Visits Requested: 1       Significant Diagnostic Studies: Labs:   BMP:   Recent Labs   Lab 03/03/22  1855 03/04/22  0516   GLU 96 170*    140   K 4.5 4.2    105   CO2 23 23   BUN 12 16   CREATININE 0.8 1.0   CALCIUM 9.8 9.9   , CMP   Recent Labs   Lab 03/03/22  1855 03/04/22  0516    140   K 4.5 4.2    105   CO2 23 23   GLU 96 170*   BUN 12 16   CREATININE 0.8 1.0   CALCIUM 9.8 9.9   PROT 6.8  --    ALBUMIN 3.6 3.5   BILITOT 0.5  --    ALKPHOS 68  --    AST 22  --    ALT 28  --    ANIONGAP 12 12   ESTGFRAFRICA >60 >60   EGFRNONAA >60 >60    and CBC   Recent Labs   Lab 03/03/22 1855 03/04/22  0516   WBC 6.65 8.03   HGB 11.1* 11.1*   HCT 35.3* 35.7*    216       Pending Diagnostic Studies:     Procedure Component Value Units Date/Time    IgE [[id number]] Collected: 03/04/22 1040    Order Status: Sent Lab Status: In process Updated: 03/04/22 1412    Specimen: Blood          Medications:  Reconciled Home Medications:      Medication List      START taking these medications    montelukast 10 mg tablet  Commonly known as: SINGULAIR  Take 1 tablet (10 mg total) by mouth every evening.     predniSONE 20 MG tablet  Commonly known as: DELTASONE  Take 2 tablets (40 mg total) by mouth once daily. for 5 days        CHANGE how you take these medications    BREO ELLIPTA 200-25 mcg/dose Dsdv diskus inhaler  Generic drug: fluticasone furoate-vilanteroL  INHALE 1 PUFF BY MOUTH ONCE A DAY  What changed: See the new instructions.        CONTINUE taking these medications    albuterol 90 mcg/actuation inhaler  Commonly known as: PROVENTIL/VENTOLIN HFA  Inhale 1-2 puffs into the lungs every 6 (six) hours as needed for Wheezing or Shortness of Breath. Rescue     fluticasone propionate 50 mcg/actuation nasal spray  Commonly known as: FLONASE  1 spray (50 mcg total) by Each Nostril route 2 (two)  times daily as needed for Rhinitis or Allergies.     ketorolac 0.5% 0.5 % Drop  Commonly known as: ACULAR  Place 1 drop into the right eye 4 (four) times daily.     prednisoLONE acetate 1 % Drps  Commonly known as: PRED FORTE  Place 1 drop into both eyes 4 (four) times daily.        STOP taking these medications    cetirizine 10 MG tablet  Commonly known as: ZYRTEC            Indwelling Lines/Drains at time of discharge:   Lines/Drains/Airways     None                 Time spent on the discharge of patient: >35 minutes         Ashleigh West NP  Department of Hospital Medicine  [facility] Tele[facility] ([facility])

## 2022-03-04 NOTE — HOSPITAL COURSE
Patient was placed into observation for asthma exacerbation secondary to environmental changes from recent relocation from [place]. Was given scheduled budesonide and formoterol treatments, albuterol PRN, and methylprednisone IV q 8 hours. Labs and Vitals reviewed. Patient had no acute events overnight. Home O2 evaluation completed this morning with room air o2 sats 98% at rest with drop to 92% with activity. Patient is stable and to f/u outpatient. Pulmonology on 3/7 as scheduled with Dr. Mcdonald. Patient to f/u with [facility] or [facility] for PCP establishment.  D/c home with prednisone x 5 days, Singulair, albuterol and breo ellipta.

## 2022-03-04 NOTE — H&P
[facility] Medicine  History & Physical    Patient Name: Jennifer Mcclain  MRN: [id number]  Patient Class: OP- Observation  Admission Date: 3/3/2022  Attending Physician: Josselyn Martinez MD   Primary Care Provider: Primary Doctor No         Patient information was obtained from patient and ER records.     Subjective:     Principal Problem:Moderate persistent asthma without complication    Chief Complaint:   Chief Complaint   Patient presents with    Shortness of Breath     X 3 days; wheezing x 1 day; PMHx of asthma        HPI: Ms. Mcclain came to the ED for chest tightness, shortness of breath, and wheezing over the last two days.  She has a history of asthma and GEN.  She is currently visiting her sons in [place] but lives in [place].  She previously lived in [place] and was being seen by Dr. Mota.  She uses daily scheduled and as needed inhalers but no supplemental oxygen.  She was diagnosed with sleep apnea and nocturnal hypoxemia previously.  She denies chest pain but her symptoms worsen on exertion.  No noted weight changes or leg welling.      Past Medical History:   Diagnosis Date    Asthma     Blindness     Glaucoma     Psoriasis     knees and ankles    Sarcoidosis        Past Surgical History:   Procedure Laterality Date    Laser SX Right        Review of patient's allergies indicates:  No Known Allergies    No current facility-administered medications on file prior to encounter.     Current Outpatient Medications on File Prior to Encounter   Medication Sig    cetirizine (ZYRTEC) 10 MG tablet Take 1 tablet (10 mg total) by mouth once daily.    albuterol (PROVENTIL/VENTOLIN HFA) 90 mcg/actuation inhaler Inhale 1-2 puffs into the lungs every 6 (six) hours as needed for Wheezing or Shortness of Breath. Rescue    BREO ELLIPTA 200-25 mcg/dose DsDv diskus inhaler INHALE 1 PUFF ONCE A DAY    fluticasone propionate (FLONASE) 50 mcg/actuation nasal spray 1 spray (50 mcg  total) by Each Nostril route 2 (two) times daily as needed for Rhinitis or Allergies.    ketorolac 0.5% (ACULAR) 0.5 % Drop Place 1 drop into the right eye 4 (four) times daily.    prednisoLONE acetate (PRED FORTE) 1 % DrpS Place 1 drop into both eyes 4 (four) times daily.     Family History       Problem Relation (Age of Onset)    Heart disease Father    Hypertension Maternal Grandmother          Tobacco Use    Smoking status: Never Smoker    Smokeless tobacco: Never Used   Substance and Sexual Activity    Alcohol use: No    Drug use: No    Sexual activity: Not on file     Review of Systems   Constitutional:  Negative for chills and fever.   HENT:  Negative for congestion and sore throat.    Eyes:  Negative for visual disturbance.   Respiratory:  Positive for cough, shortness of breath and wheezing.    Cardiovascular:  Negative for chest pain, palpitations and leg swelling.   Gastrointestinal:  Negative for abdominal pain, blood in stool, constipation, diarrhea, nausea and vomiting.   Genitourinary:  Negative for dysuria and hematuria.   Musculoskeletal:  Negative for arthralgias and back pain.   Skin:  Negative for rash and wound.   Neurological:  Negative for dizziness, weakness, light-headedness and numbness.   Hematological:  Negative for adenopathy.   Objective:     Vital Signs (Most Recent):  Temp: 98.5 °F (36.9 °C) (03/04/22 0043)  Pulse: 95 (03/04/22 0457)  Resp: 19 (03/04/22 0457)  BP: (!) 124/56 (03/04/22 0457)  SpO2: 99 % (03/04/22 0457)   Vital Signs (24h Range):  Temp:  [98.5 °F (36.9 °C)-98.6 °F (37 °C)] 98.5 °F (36.9 °C)  Pulse:  [] 95  Resp:  [19-28] 19  SpO2:  [94 %-100 %] 99 %  BP: (111-154)/(54-64) 124/56     Weight: 115.4 kg (254 lb 6.6 oz)  Body mass index is 45.07 kg/m².    Physical Exam  Vitals and nursing note reviewed.   Constitutional:       General: She is not in acute distress.     Appearance: She is well-developed.   HENT:      Head: Normocephalic and atraumatic.   Eyes:       Conjunctiva/sclera: Conjunctivae normal.      Pupils: Pupils are equal, round, and reactive to light.   Neck:      Thyroid: No thyromegaly.      Vascular: No JVD.   Cardiovascular:      Rate and Rhythm: Normal rate and regular rhythm.      Heart sounds: No murmur heard.    No friction rub. No gallop.   Pulmonary:      Breath sounds: Wheezing present. No rales.      Comments: Mild tachypnea.  Reduced breath sounds through with end-expiratory wheezing bilaterally.  Abdominal:      General: Bowel sounds are normal. There is no distension.      Palpations: Abdomen is soft.      Tenderness: There is no abdominal tenderness. There is no guarding or rebound.   Musculoskeletal:         General: No deformity. Normal range of motion.      Cervical back: Neck supple.   Lymphadenopathy:      Cervical: No cervical adenopathy.   Skin:     General: Skin is warm and dry.      Findings: No rash.   Neurological:      Mental Status: She is alert and oriented to person, place, and time.      Deep Tendon Reflexes: Reflexes are normal and symmetric.   Psychiatric:         Behavior: Behavior normal.         Thought Content: Thought content normal.         Judgment: Judgment normal.         CRANIAL NERVES     CN III, IV, VI   Pupils are equal, round, and reactive to light.     Significant Labs: All pertinent labs within the past 24 hours have been reviewed.    Significant Imaging: I have reviewed all pertinent imaging results/findings within the past 24 hours.    Assessment/Plan:     * Moderate persistent asthma without complication  Symptoms most suggestive of asthma exacerbation.  She traveled her from [place] in the last couple weeks - Asthma could have been triggered by environmental change.  Scheduled Budesonide and Formoterol nebulizer treatments with Albuterol treatments as needed.  Methylprednisolone 80 mg every 8 hours.  Consult to pulmonary Medicine for assistance optimizing care.    GEN on CPAP  CPAP nightly with pressure 10 and  40% fiO2.    Diastolic dysfunction  BNP is normal without signs of peripheral edema.  Troponin normal and no signs of ischemia on EKG.    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    Sarcoidosis  Uncertain if there is pulmonary involvement.  No nodules on her last CT of the chest and ACE level has been normal.  Discuss with Pulmonary medicine.      VTE Risk Mitigation (From admission, onward)         Ordered     enoxaparin injection 40 mg  Every 12 hours         03/04/22 0040     IP VTE HIGH RISK PATIENT  Once         03/04/22 0040     Place sequential compression device  Until discontinued         03/04/22 0040                   Curry Lewis MD  Department of Hospital Medicine   '[facility] - Tele[facility] ([facility])

## 2022-03-04 NOTE — ASSESSMENT & PLAN NOTE
Symptoms most suggestive of asthma exacerbation.  She traveled her from [place] in the last couple weeks - Asthma could have been triggered by environmental change.  Scheduled Budesonide and Formoterol nebulizer treatments with Albuterol treatments as needed.  Methylprednisolone 80 mg every 8 hours.  Consult to pulmonary Medicine for assistance optimizing care.

## 2022-03-04 NOTE — PLAN OF CARE
Received order to do O2 challenge pt was on room air sitting in bed her spo2 was 98 hr 105. Pt has limited walking skills had her sit on side of bed and her spo2 decrease to 92% hr 107 pt did not present with any distress during this exercise.

## 2022-03-04 NOTE — CONSULTS
[facility] ([facility])  Pulmonology  Consult Note    Patient Name: Jennifer Mcclain  MRN: [id number]  Admission Date: 3/3/2022  Hospital Length of Stay: 0 days  Code Status: Full Code  Attending Physician: Josselyn Martinez MD  Primary Care Provider: Primary Doctor No   Principal Problem: Moderate persistent asthma without complication    [unfilled]  Subjective:     HPI:  Jennifer Mcclain is 51 y.o.  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, GEN CPAP Asthma  Moved to [place] recently  Presented with SOB, wheezing  Treated with Steriods,, bronchdilators responding well  Home meds: BREO, CHEYANNE, nebuliser        Past Medical History:   Diagnosis Date    Asthma     Blindness     Glaucoma     Psoriasis     knees and ankles    Sarcoidosis        Past Surgical History:   Procedure Laterality Date    Laser SX Right        Review of patient's allergies indicates:  No Known Allergies    Family History       Problem Relation (Age of Onset)    Heart disease Father    Hypertension Maternal Grandmother          Tobacco Use    Smoking status: Never Smoker    Smokeless tobacco: Never Used   Substance and Sexual Activity    Alcohol use: No    Drug use: No    Sexual activity: Not on file         Review of Systems   Respiratory:  Positive for shortness of breath and wheezing.    All other systems reviewed and are negative.  Objective:     Vital Signs (Most Recent):  Temp: 97.6 °F (36.4 °C) (03/04/22 0707)  Pulse: 86 (03/04/22 0738)  Resp: 20 (03/04/22 0738)  BP: 131/68 (03/04/22 0707)  SpO2: 98 % (03/04/22 0735) Vital Signs (24h Range):  Temp:  [97.6 °F (36.4 °C)-98.6 °F (37 °C)] 97.6 °F (36.4 °C)  Pulse:  [] 86  Resp:  [18-28] 20  SpO2:  [94 %-100 %] 98 %  BP: (111-154)/(54-68) 131/68     Weight: 115.4 kg (254 lb 6.6 oz)  Body mass index is 45.07 kg/m².      Intake/Output Summary (Last 24 hours) at 3/4/2022 0859  Last data filed at 3/3/2022 2138  Gross per 24 hour   Intake 100  Report received from RN. Assumed care of pt. Aware of POC, awaiting results. Ambulated to bathroom without assistance, gait steady.   ml   Output --   Net 100 ml       Physical Exam  Vitals and nursing note reviewed.   Constitutional:       General: She is not in acute distress.     Appearance: She is well-developed.   HENT:      Head: Normocephalic and atraumatic.   Eyes:      Conjunctiva/sclera: Conjunctivae normal.      Pupils: Pupils are equal, round, and reactive to light.   Neck:      Thyroid: No thyromegaly.      Vascular: No JVD.   Cardiovascular:      Rate and Rhythm: Normal rate and regular rhythm.      Heart sounds: No murmur heard.    No friction rub. No gallop.   Pulmonary:      Breath sounds: Wheezing present. No rales.      Comments: Mild tachypnea.  Reduced breath sounds through with end-expiratory wheezing bilaterally.  Abdominal:      General: Bowel sounds are normal. There is no distension.      Palpations: Abdomen is soft.      Tenderness: There is no abdominal tenderness. There is no guarding or rebound.   Musculoskeletal:         General: No deformity. Normal range of motion.      Cervical back: Neck supple.   Lymphadenopathy:      Cervical: No cervical adenopathy.   Skin:     General: Skin is warm and dry.      Findings: No rash.   Neurological:      Mental Status: She is alert and oriented to person, place, and time.      Deep Tendon Reflexes: Reflexes are normal and symmetric.   Psychiatric:         Behavior: Behavior normal.         Thought Content: Thought content normal.         Judgment: Judgment normal.       Vents:  Oxygen Concentration (%): 40 (03/04/22 0735)    Lines/Drains/Airways       Peripheral Intravenous Line  Duration                  Peripheral IV - Single Lumen 03/03/22 1855 20 G Right Hand <1 day                    Significant Labs:    CBC/Anemia Profile:  Recent Labs   Lab 03/03/22 1855 03/04/22  0516   WBC 6.65 8.03   HGB 11.1* 11.1*   HCT 35.3* 35.7*    216   MCV 87 88   RDW 14.7* 14.9*        Chemistries:  Recent Labs   Lab 03/03/22 1855 03/04/22  0516    140   K 4.5 4.2    105    CO2 23 23   BUN 12 16   CREATININE 0.8 1.0   CALCIUM 9.8 9.9   ALBUMIN 3.6 3.5   PROT 6.8  --    BILITOT 0.5  --    ALKPHOS 68  --    ALT 28  --    AST 22  --    PHOS  --  3.5       ABGs: No results for input(s): PH, PCO2, HCO3, POCSATURATED, BE in the last 48 hours.  Bilirubin:   Recent Labs   Lab 03/03/22  1855   BILITOT 0.5     Cardiac Markers: No results for input(s): CKMB, TROPONINT, MYOGLOBIN in the last 48 hours.  POCT Glucose: No results for input(s): POCTGLUCOSE in the last 48 hours.  Respiratory Culture: No results for input(s): GSRESP, RESPIRATORYC in the last 48 hours.  All pertinent labs within the past 24 hours have been reviewed.    Significant Imaging:   I have reviewed all pertinent imaging results/findings within the past 24 hours.      ABG  No results for input(s): PH, PO2, PCO2, HCO3, BE in the last 168 hours.  Assessment/Plan:     * Moderate persistent asthma without complication  Bronchodilators  IV steriods  Arformoterol  LMWH  Monitor glucose    May need home O2 eval    GEN on CPAP  Adherence with CPAP    Sarcoidosis  Stable        Thank you for your consult. I will follow-up with patient. Please contact us if you have any additional questions.     Mike Mota MD  Pulmonology  [facility] ([facility])

## 2022-03-04 NOTE — SUBJECTIVE & OBJECTIVE
Past Medical History:   Diagnosis Date    Asthma     Blindness     Glaucoma     Psoriasis     knees and ankles    Sarcoidosis        Past Surgical History:   Procedure Laterality Date    Laser SX Right        Review of patient's allergies indicates:  No Known Allergies    No current facility-administered medications on file prior to encounter.     Current Outpatient Medications on File Prior to Encounter   Medication Sig    cetirizine (ZYRTEC) 10 MG tablet Take 1 tablet (10 mg total) by mouth once daily.    albuterol (PROVENTIL/VENTOLIN HFA) 90 mcg/actuation inhaler Inhale 1-2 puffs into the lungs every 6 (six) hours as needed for Wheezing or Shortness of Breath. Rescue    BREO ELLIPTA 200-25 mcg/dose DsDv diskus inhaler INHALE 1 PUFF ONCE A DAY    fluticasone propionate (FLONASE) 50 mcg/actuation nasal spray 1 spray (50 mcg total) by Each Nostril route 2 (two) times daily as needed for Rhinitis or Allergies.    ketorolac 0.5% (ACULAR) 0.5 % Drop Place 1 drop into the right eye 4 (four) times daily.    prednisoLONE acetate (PRED FORTE) 1 % DrpS Place 1 drop into both eyes 4 (four) times daily.     Family History       Problem Relation (Age of Onset)    Heart disease Father    Hypertension Maternal Grandmother          Tobacco Use    Smoking status: Never Smoker    Smokeless tobacco: Never Used   Substance and Sexual Activity    Alcohol use: No    Drug use: No    Sexual activity: Not on file     Review of Systems   Constitutional:  Negative for chills and fever.   HENT:  Negative for congestion and sore throat.    Eyes:  Negative for visual disturbance.   Respiratory:  Positive for cough, shortness of breath and wheezing.    Cardiovascular:  Negative for chest pain, palpitations and leg swelling.   Gastrointestinal:  Negative for abdominal pain, blood in stool, constipation, diarrhea, nausea and vomiting.   Genitourinary:  Negative for dysuria and hematuria.   Musculoskeletal:  Negative for arthralgias and back pain.    Skin:  Negative for rash and wound.   Neurological:  Negative for dizziness, weakness, light-headedness and numbness.   Hematological:  Negative for adenopathy.   Objective:     Vital Signs (Most Recent):  Temp: 98.5 °F (36.9 °C) (03/04/22 0043)  Pulse: 95 (03/04/22 0457)  Resp: 19 (03/04/22 0457)  BP: (!) 124/56 (03/04/22 0457)  SpO2: 99 % (03/04/22 0457)   Vital Signs (24h Range):  Temp:  [98.5 °F (36.9 °C)-98.6 °F (37 °C)] 98.5 °F (36.9 °C)  Pulse:  [] 95  Resp:  [19-28] 19  SpO2:  [94 %-100 %] 99 %  BP: (111-154)/(54-64) 124/56     Weight: 115.4 kg (254 lb 6.6 oz)  Body mass index is 45.07 kg/m².    Physical Exam  Vitals and nursing note reviewed.   Constitutional:       General: She is not in acute distress.     Appearance: She is well-developed.   HENT:      Head: Normocephalic and atraumatic.   Eyes:      Conjunctiva/sclera: Conjunctivae normal.      Pupils: Pupils are equal, round, and reactive to light.   Neck:      Thyroid: No thyromegaly.      Vascular: No JVD.   Cardiovascular:      Rate and Rhythm: Normal rate and regular rhythm.      Heart sounds: No murmur heard.    No friction rub. No gallop.   Pulmonary:      Breath sounds: Wheezing present. No rales.      Comments: Mild tachypnea.  Reduced breath sounds through with end-expiratory wheezing bilaterally.  Abdominal:      General: Bowel sounds are normal. There is no distension.      Palpations: Abdomen is soft.      Tenderness: There is no abdominal tenderness. There is no guarding or rebound.   Musculoskeletal:         General: No deformity. Normal range of motion.      Cervical back: Neck supple.   Lymphadenopathy:      Cervical: No cervical adenopathy.   Skin:     General: Skin is warm and dry.      Findings: No rash.   Neurological:      Mental Status: She is alert and oriented to person, place, and time.      Deep Tendon Reflexes: Reflexes are normal and symmetric.   Psychiatric:         Behavior: Behavior normal.         Thought  Content: Thought content normal.         Judgment: Judgment normal.         CRANIAL NERVES     CN III, IV, VI   Pupils are equal, round, and reactive to light.     Significant Labs: All pertinent labs within the past 24 hours have been reviewed.    Significant Imaging: I have reviewed all pertinent imaging results/findings within the past 24 hours.

## 2022-03-04 NOTE — PLAN OF CARE
[facility] - Telemetry (Hospital)  Discharge Final Note    Primary Care Provider: Primary Doctor No    Expected Discharge Date: 3/4/2022    Final Discharge Note (most recent)     Final Note - 03/04/22 1516        Final Note    Assessment Type Final Discharge Note     Anticipated Discharge Disposition Home or Self Care     Hospital Resources/Appts/Education Provided Appointments scheduled by Navigator/Coordinator;Appointments scheduled and added to AVS        Post-Acute Status    Discharge Delays None known at this time                 Important Message from Medicare             Contact Info     [facility]        Next Steps: Follow up    Instructions: As Scheduled    Primary Care Provider        Next Steps: Follow up in 1 week(s)    Instructions: As Scheduled          Patient's insurance will not cover her out of the service area (She lives in [place] and her [facility] managed medicare includes [place] medicaid) and patient cannot afford a follow up with Pulmonary (Dr. Mcdonald 3-7-22)as well as PCP.  She is unsure is she is going to move in with her son in [place] or [place].  Patient was set up with an NP at home.  Patient left before CM could explain NP at home.   Message left on patient's cell phone.

## 2022-03-04 NOTE — PROGRESS NOTES
Pharmacist Renal Dose Adjustment Note    Jennifer Mcclain is a 51 y.o. female being treated with the medication enoxaparin    Patient Data:    Vital Signs (Most Recent):  Temp: 98.5 °F (36.9 °C) (03/04/22 0043)  Pulse: 99 (03/04/22 0043)  Resp: 20 (03/04/22 0043)  BP: 127/61 (03/04/22 0043)  SpO2: 96 % (03/04/22 0043) Vital Signs (72h Range):  Temp:  [98.5 °F (36.9 °C)-98.6 °F (37 °C)]   Pulse:  []   Resp:  [20-28]   BP: (111-154)/(54-64)   SpO2:  [94 %-100 %]      Recent Labs   Lab 03/03/22 1855   CREATININE 0.8     Serum creatinine: 0.8 mg/dL 03/03/22 1855  Estimated creatinine clearance: 99.6 mL/min    Enoxaparin 40 mg q24h will be changed to enoxaparin 40 mg q12h for CrCl >30 mL/min and BMI >40.     Pharmacist's Name: Mell Smyth  Pharmacist's Extension: [phone]

## 2022-03-04 NOTE — ED NOTES
Mag and breathing tx complete. Pt states breathing feels better than before but she is still wheezing. Audible wheezes heard

## 2022-03-04 NOTE — HPI
Ms. Mcclain came to the ED for chest tightness, shortness of breath, and wheezing over the last two days.  She has a history of asthma and GEN.  She is currently visiting her sons in [place] but lives in [place].  She previously lived in [place] and was being seen by Dr. Mota.  She uses daily scheduled and as needed inhalers but no supplemental oxygen.  She was diagnosed with sleep apnea and nocturnal hypoxemia previously.  She denies chest pain but her symptoms worsen on exertion.  No noted weight changes or leg welling.

## 2022-03-04 NOTE — ED PROVIDER NOTES
SCRIBE #1 NOTE: I, Steve Colby, am scribing for, and in the presence of, Tree Piedra MD. I have scribed the entire note.       History     Chief Complaint   Patient presents with    Shortness of Breath     X 3 days; wheezing x 1 day; PMHx of asthma     Review of patient's allergies indicates:  No Known Allergies      History of Present Illness     HPI    3/3/2022, 7:54 PM  History obtained from the patient      History of Present Illness: Jennifer Mcclain is a 51 y.o. female patient with a PMHx of Asthma, Blindness, and Glaucoma  who presents to the Emergency Department for evaluation of SOB which started 3 days ago. Pt is visiting from [place] and noticed she's experiencing similar past symptoms of prior asthma attacks. Pt complains of SOB, palpations, coughing, and intermittent wheezing. Pt. Is prescribed Albuterol and Breo for asthma, but is currently out of both medications.  Symptoms are constant and moderate in severity. No mitigating or exacerbating factors reported.  Patient denies any fever, chest pain, N/V, HA and all other sxs at this time. No further complaints or concerns at this time.       Arrival mode: Personal vehicle   PCP: Primary Doctor No        Past Medical History:  Past Medical History:   Diagnosis Date    Asthma     Blindness     Glaucoma     Psoriasis     knees and ankles    Sarcoidosis        Past Surgical History:  Past Surgical History:   Procedure Laterality Date    Laser SX Right          Family History:  Family History   Problem Relation Age of Onset    Heart disease Father     Hypertension Maternal Grandmother        Social History:  Social History     Tobacco Use    Smoking status: Never Smoker    Smokeless tobacco: Never Used   Substance and Sexual Activity    Alcohol use: No    Drug use: No    Sexual activity: Not on file        Review of Systems     Review of Systems   Constitutional: Negative for fever.   HENT: Negative for sore throat.    Respiratory: Positive  "for cough, shortness of breath and wheezing.    Cardiovascular: Positive for palpitations. Negative for chest pain.   Gastrointestinal: Negative for nausea and vomiting.   Genitourinary: Negative for dysuria.   Musculoskeletal: Negative for back pain.   Skin: Negative for rash.   Neurological: Negative for weakness and headaches.   Hematological: Does not bruise/bleed easily.   All other systems reviewed and are negative.       Physical Exam     Initial Vitals [03/03/22 1804]   BP Pulse Resp Temp SpO2   (!) 154/63 91 20 98.6 °F (37 °C) 97 %      MAP       --          Physical Exam  Nursing Notes and Vital Signs Reviewed.  Constitutional: Patient is in no acute distress. Well-developed and well-nourished.  Head: Atraumatic. Normocephalic.  Eyes: PERRL. EOM intact. Conjunctivae are not pale. No scleral icterus.  ENT: Mucous membranes are moist. Oropharynx is clear and symmetric.    Neck: Supple. Full ROM. No lymphadenopathy.  Cardiovascular: Regular rate. Regular rhythm. No murmurs, rubs, or gallops. Distal pulses are 2+ and symmetric.  Pulmonary/Chest: No respiratory distress.Bilateral expiratory wheezing. No rales.  Abdominal: Soft and non-distended.  There is no tenderness.  No rebound, guarding, or rigidity. Good bowel sounds.  Genitourinary: No CVA tenderness  Musculoskeletal: Moves all extremities. No obvious deformities. No edema. No calf tenderness.  Skin: Warm and dry.  Neurological:  Alert, awake, and appropriate.  Normal speech.  No acute focal neurological deficits are appreciated.  Psychiatric: Normal affect. Good eye contact. Appropriate in content.     ED Course   Procedures  ED Vital Signs:  Vitals:    03/03/22 1804 03/03/22 1837 03/03/22 1840 03/03/22 1843   BP: (!) 154/63      Pulse: 91 92 92 92   Resp: 20 (!) 24 (!) 24 (!) 24   Temp: 98.6 °F (37 °C)      TempSrc: Oral      SpO2: 97% 98% 98% 98%   Weight:       Height: 5' 3" (1.6 m)       03/03/22 1945 03/03/22 2030 03/03/22 2037 03/03/22 2100   BP: " 133/64 (!) 119/55  (!) 115/54   Pulse: 90 88 87 93   Resp: (!) 24 (!) 22 (!) 22 (!) 21   Temp:       TempSrc:       SpO2: 97% 96% 95% 100%   Weight:  115.4 kg (254 lb 6.6 oz)     Height:        03/03/22 2130 03/03/22 2200 03/03/22 2230 03/03/22 2300   BP: (!) 118/58 (!) 122/59 122/64 (!) 111/54   Pulse: 105 110 106 102   Resp: (!) 21 (!) 23 (!) 28 (!) 23   Temp:       TempSrc:       SpO2: 100% (!) 94% 96% 97%   Weight:       Height:        03/03/22 2330   BP: (!) 117/55   Pulse: 105   Resp: (!) 25   Temp:    TempSrc:    SpO2: 97%   Weight:    Height:        Abnormal Lab Results:  Labs Reviewed   CBC W/ AUTO DIFFERENTIAL - Abnormal; Notable for the following components:       Result Value    Hemoglobin 11.1 (*)     Hematocrit 35.3 (*)     MCHC 31.4 (*)     RDW 14.7 (*)     Eos # 0.7 (*)     Eosinophil % 11.1 (*)     All other components within normal limits   COMPREHENSIVE METABOLIC PANEL   B-TYPE NATRIURETIC PEPTIDE   TROPONIN I   SARS-COV-2 RDRP GENE    Narrative:     This test utilizes isothermal nucleic acid amplification   technology to detect the SARS-CoV-2 RdRp nucleic acid segment.   The analytical sensitivity (limit of detection) is 125 genome   equivalents/mL.   A POSITIVE result implies infection with the SARS-CoV-2 virus;   the patient is presumed to be contagious.     A NEGATIVE result means that SARS-CoV-2 nucleic acids are not   present above the limit of detection. A NEGATIVE result should be   treated as presumptive. It does not rule out the possibility of   COVID-19 and should not be the sole basis for treatment decisions.   If COVID-19 is strongly suspected based on clinical and exposure   history, re-testing using an alternate molecular assay should be   considered.   This test is only for use under the Food and Drug   Administration s Emergency Use Authorization (EUA).   Commercial kits are provided by [employer].   Performance characteristics of the EUA have been independently   verified  by [facility] Department of   Pathology and Laboratory Medicine.   _________________________________________________________________   The authorized Fact Sheet for Healthcare Providers and the authorized Fact   Sheet for Patients of the ID NOW COVID-19 are available on the FDA   website:     https://www.fda.gov/media/120273/download  https://www.fda.gov/media/901305/download           POCT INFLUENZA A/B MOLECULAR        All Lab Results:  Results for orders placed or performed during the hospital encounter of 03/03/22   CBC auto differential   Result Value Ref Range    WBC 6.65 3.90 - 12.70 K/uL    RBC 4.05 4.00 - 5.40 M/uL    Hemoglobin 11.1 (L) 12.0 - 16.0 g/dL    Hematocrit 35.3 (L) 37.0 - 48.5 %    MCV 87 82 - 98 fL    MCH 27.4 27.0 - 31.0 pg    MCHC 31.4 (L) 32.0 - 36.0 g/dL    RDW 14.7 (H) 11.5 - 14.5 %    Platelets 219 150 - 450 K/uL    MPV 11.6 9.2 - 12.9 fL    Immature Granulocytes 0.2 0.0 - 0.5 %    Gran # (ANC) 3.3 1.8 - 7.7 K/uL    Immature Grans (Abs) 0.01 0.00 - 0.04 K/uL    Lymph # 1.9 1.0 - 4.8 K/uL    Mono # 0.6 0.3 - 1.0 K/uL    Eos # 0.7 (H) 0.0 - 0.5 K/uL    Baso # 0.02 0.00 - 0.20 K/uL    nRBC 0 0 /100 WBC    Gran % 49.6 38.0 - 73.0 %    Lymph % 29.2 18.0 - 48.0 %    Mono % 9.6 4.0 - 15.0 %    Eosinophil % 11.1 (H) 0.0 - 8.0 %    Basophil % 0.3 0.0 - 1.9 %    Differential Method Automated    Comprehensive metabolic panel   Result Value Ref Range    Sodium 141 136 - 145 mmol/L    Potassium 4.5 3.5 - 5.1 mmol/L    Chloride 106 95 - 110 mmol/L    CO2 23 23 - 29 mmol/L    Glucose 96 70 - 110 mg/dL    BUN 12 6 - 20 mg/dL    Creatinine 0.8 0.5 - 1.4 mg/dL    Calcium 9.8 8.7 - 10.5 mg/dL    Total Protein 6.8 6.0 - 8.4 g/dL    Albumin 3.6 3.5 - 5.2 g/dL    Total Bilirubin 0.5 0.1 - 1.0 mg/dL    Alkaline Phosphatase 68 55 - 135 U/L    AST 22 10 - 40 U/L    ALT 28 10 - 44 U/L    Anion Gap 12 8 - 16 mmol/L    eGFR if African American >60 >60 mL/min/1.73 m^2    eGFR if non  >60  >60 mL/min/1.73 m^2   Brain natriuretic peptide   Result Value Ref Range    BNP <10 0 - 99 pg/mL   Troponin I   Result Value Ref Range    Troponin I <0.006 0.000 - 0.026 ng/mL   POCT COVID-19 Rapid Screening   Result Value Ref Range    POC Rapid COVID Negative Negative     Acceptable Yes    POCT Influenza A/B Molecular   Result Value Ref Range    POC Molecular Influenza A Ag Negative Negative, Not Reported    POC Molecular Influenza B Ag Negative Negative, Not Reported     Acceptable Yes        Imaging Results:  Imaging Results          X-Ray Chest AP Portable (Final result)  Result time 03/03/22 19:50:32    Final result by Gt Hand MD (03/03/22 19:50:32)                 Impression:      Similar findings to prior exam.      Electronically signed by: Yazan Del Real  Date:    03/03/2022  Time:    19:50             Narrative:    EXAMINATION:  XR CHEST AP PORTABLE    CLINICAL HISTORY:  shortness of breath;    TECHNIQUE:  Single frontal view of the chest was performed.    COMPARISON:  Prior    FINDINGS:  Perihilar interstitial opacities with low lung volumes.  Element of pulmonary edema not excluded.    Prominent cardiac silhouette    Bones are intact.                                 The EKG was ordered, reviewed, and independently interpreted by the ED provider.  Interpretation time: 18:05  Rate: 88 BPM  Rhythm: normal sinus rhythm  Interpretation: No Acute ST changes. No STEMI.             The Emergency Provider reviewed the vital signs and test results, which are outlined above.     ED Discussion     10:13 PM: Re-evaluated pt.  Pt states she is having no improvement of sxs; on exam, pt is still having bilateral expiratory wheezing.  D/w pt all pertinent results. D/w pt any concerns expressed at this time. Answered all questions. Pt expresses understanding at this time.    10:31PM: Discussed case with Danika Rodriguez NP (Hospital Medicine). Dr. Lewis agrees with current care and  management of pt and accepts admission.   Admitting Service: Hospital Medicine  Admitting Physician: Dr. Lewis  Admit to: OBS Tele    10:35 PM: Re-evaluated pt. I have discussed test results, shared treatment plan, and the need for admission with patient and family at bedside. Pt and family express understanding at this time and agree with all information. All questions answered. Pt and family have no further questions or concerns at this time. Pt is ready for admit.         Medical Decision Making:   Clinical Tests:   Lab Tests: Ordered and Reviewed  Radiological Study: Ordered and Reviewed  Medical Tests: Ordered and Reviewed           ED Medication(s):  Medications   albuterol nebulizer solution 10 mg (10 mg Nebulization New Bag 3/3/22 2037)   albuterol-ipratropium 2.5 mg-0.5 mg/3 mL nebulizer solution 3 mL (3 mLs Nebulization Given 3/3/22 1843)   methylPREDNISolone sodium succinate injection 125 mg (125 mg Intramuscular Given 3/3/22 1932)   magnesium sulfate in dextrose IVPB (premix) 1 g (0 g Intravenous Stopped 3/3/22 2138)       New Prescriptions    No medications on file               Scribe Attestation:   Scribe #1: I performed the above scribed service and the documentation accurately describes the services I performed. I attest to the accuracy of the note.     Attending:   Physician Attestation Statement for Scribe #1: I, Tree Piedra MD, personally performed the services described in this documentation, as scribed by Steve Colby, in my presence, and it is both accurate and complete.           Clinical Impression       ICD-10-CM ICD-9-CM   1. Moderate asthma with exacerbation, unspecified whether persistent  J45.901 493.92   2. SOB (shortness of breath)  R06.02 786.05       Disposition:   Disposition: Placed in Observation (Tele)  Condition: Fair       Tree Piedra MD  03/04/22 0006

## 2022-03-04 NOTE — ASSESSMENT & PLAN NOTE
BNP is normal without signs of peripheral edema.  Troponin normal and no signs of ischemia on EKG.

## 2022-03-04 NOTE — SUBJECTIVE & OBJECTIVE
Past Medical History:   Diagnosis Date    Asthma     Blindness     Glaucoma     Psoriasis     knees and ankles    Sarcoidosis        Past Surgical History:   Procedure Laterality Date    Laser SX Right        Review of patient's allergies indicates:  No Known Allergies    Family History       Problem Relation (Age of Onset)    Heart disease Father    Hypertension Maternal Grandmother          Tobacco Use    Smoking status: Never Smoker    Smokeless tobacco: Never Used   Substance and Sexual Activity    Alcohol use: No    Drug use: No    Sexual activity: Not on file         Review of Systems   Respiratory:  Positive for shortness of breath and wheezing.    All other systems reviewed and are negative.  Objective:     Vital Signs (Most Recent):  Temp: 97.6 °F (36.4 °C) (03/04/22 0707)  Pulse: 86 (03/04/22 0738)  Resp: 20 (03/04/22 0738)  BP: 131/68 (03/04/22 0707)  SpO2: 98 % (03/04/22 0735) Vital Signs (24h Range):  Temp:  [97.6 °F (36.4 °C)-98.6 °F (37 °C)] 97.6 °F (36.4 °C)  Pulse:  [] 86  Resp:  [18-28] 20  SpO2:  [94 %-100 %] 98 %  BP: (111-154)/(54-68) 131/68     Weight: 115.4 kg (254 lb 6.6 oz)  Body mass index is 45.07 kg/m².      Intake/Output Summary (Last 24 hours) at 3/4/2022 0859  Last data filed at 3/3/2022 2138  Gross per 24 hour   Intake 100 ml   Output --   Net 100 ml       Physical Exam  Vitals and nursing note reviewed.   Constitutional:       General: She is not in acute distress.     Appearance: She is well-developed.   HENT:      Head: Normocephalic and atraumatic.   Eyes:      Conjunctiva/sclera: Conjunctivae normal.      Pupils: Pupils are equal, round, and reactive to light.   Neck:      Thyroid: No thyromegaly.      Vascular: No JVD.   Cardiovascular:      Rate and Rhythm: Normal rate and regular rhythm.      Heart sounds: No murmur heard.    No friction rub. No gallop.   Pulmonary:      Breath sounds: Wheezing present. No rales.      Comments: Mild tachypnea.  Reduced breath sounds  through with end-expiratory wheezing bilaterally.  Abdominal:      General: Bowel sounds are normal. There is no distension.      Palpations: Abdomen is soft.      Tenderness: There is no abdominal tenderness. There is no guarding or rebound.   Musculoskeletal:         General: No deformity. Normal range of motion.      Cervical back: Neck supple.   Lymphadenopathy:      Cervical: No cervical adenopathy.   Skin:     General: Skin is warm and dry.      Findings: No rash.   Neurological:      Mental Status: She is alert and oriented to person, place, and time.      Deep Tendon Reflexes: Reflexes are normal and symmetric.   Psychiatric:         Behavior: Behavior normal.         Thought Content: Thought content normal.         Judgment: Judgment normal.       Vents:  Oxygen Concentration (%): 40 (03/04/22 0735)    Lines/Drains/Airways       Peripheral Intravenous Line  Duration                  Peripheral IV - Single Lumen 03/03/22 1855 20 G Right Hand <1 day                    Significant Labs:    CBC/Anemia Profile:  Recent Labs   Lab 03/03/22 1855 03/04/22  0516   WBC 6.65 8.03   HGB 11.1* 11.1*   HCT 35.3* 35.7*    216   MCV 87 88   RDW 14.7* 14.9*        Chemistries:  Recent Labs   Lab 03/03/22 1855 03/04/22  0516    140   K 4.5 4.2    105   CO2 23 23   BUN 12 16   CREATININE 0.8 1.0   CALCIUM 9.8 9.9   ALBUMIN 3.6 3.5   PROT 6.8  --    BILITOT 0.5  --    ALKPHOS 68  --    ALT 28  --    AST 22  --    PHOS  --  3.5       ABGs: No results for input(s): PH, PCO2, HCO3, POCSATURATED, BE in the last 48 hours.  Bilirubin:   Recent Labs   Lab 03/03/22 1855   BILITOT 0.5     Cardiac Markers: No results for input(s): CKMB, TROPONINT, MYOGLOBIN in the last 48 hours.  POCT Glucose: No results for input(s): POCTGLUCOSE in the last 48 hours.  Respiratory Culture: No results for input(s): GSRESP, RESPIRATORYC in the last 48 hours.  All pertinent labs within the past 24 hours have been  reviewed.    Significant Imaging:   I have reviewed all pertinent imaging results/findings within the past 24 hours.

## 2022-03-04 NOTE — ASSESSMENT & PLAN NOTE
Uncertain if there is pulmonary involvement.  No nodules on her last CT of the chest and ACE level has been normal.  Discuss with Pulmonary medicine.    03/04/2022  Follow up with pulmonology outpatient for further work up  Not candidate for home o2- room air 98%

## 2022-03-04 NOTE — ASSESSMENT & PLAN NOTE
Uncertain if there is pulmonary involvement.  No nodules on her last CT of the chest and ACE level has been normal.  Discuss with Pulmonary medicine.

## 2022-03-04 NOTE — HPI
Jennifer Mcclain is 51 y.o.  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

## 2022-03-04 NOTE — ASSESSMENT & PLAN NOTE
Symptoms most suggestive of asthma exacerbation.  She traveled her from [place] in the last couple weeks - Asthma could have been triggered by environmental change.  Scheduled Budesonide and Formoterol nebulizer treatments with Albuterol treatments as needed.  Methylprednisolone 80 mg every 8 hours.  Consult to pulmonary Medicine for assistance optimizing care.    03/04/2022   Significant improvement overnight  Albuterol and breo refills  Prednisone PO x 5 days  F/u pulmonology outpatient

## 2022-03-04 NOTE — FIRST PROVIDER EVALUATION
"Medical screening exam completed.  I have conducted a focused provider triage encounter, findings are as follows:    Brief history of present illness:  Pt presents with c/o shortness of breath and wheezing x2-3 days.  Last used inhaler yesterday    Vitals:    03/03/22 1804   BP: (!) 154/63   BP Location: Right arm   Patient Position: Sitting   Pulse: 91   Resp: 20   Temp: 98.6 °F (37 °C)   TempSrc: Oral   SpO2: 97%   Weight: (S)   Comment: bed weight   Height: 5' 3" (1.6 m)       Pertinent physical exam:      Brief workup plan:      Preliminary workup initiated; this workup will be continued and followed by the physician or advanced practice provider that is assigned to the patient when roomed.  "

## 2022-03-04 NOTE — PLAN OF CARE
[facility] - Telemetry (Hospital)  Initial Discharge Assessment       Primary Care Provider: Primary Doctor No    Admission Diagnosis: SOB (shortness of breath) [R06.02]  Chest pain [R07.9]  Moderate asthma with exacerbation, unspecified whether persistent [J45.901]    Admission Date: 3/3/2022  Expected Discharge Date: 3/4/2022    Discharge Barriers Identified: Other (see comments) (will have to pay for follow up)    Payor: [facility] MANAGED MCARE / Plan: [facility] DUAL COMPLETE HMO SNP / Product Type: Medicare Advantage /     Extended Emergency Contact Information  Primary Emergency Contact: Aron Paige   [place]  Mobile Phone: [phone]  Relation: Son    Discharge Plan A: Home with family         [facility]  [id number] [place] [address] [address] [place] [address]  Phone: [phone] Fax: [phone]    [id number] - [place] - [address] [facility] AT [facility]  [address] [address]  [place] [address]  Phone: [phone] Fax: [phone]    [id number] - [place], [place] - [address] AT [facility]  [place] [address]  Phone: [phone] Fax: [phone]      Initial Assessment (most recent)     Adult Discharge Assessment - 03/04/22 1329        Discharge Assessment    Assessment Type Discharge Planning Assessment     Confirmed/corrected address, phone number and insurance Yes     Confirmed Demographics Correct on Facesheet     Source of Information patient     When was your last doctors appointment? --   2 years ago    Communicated CARYN with patient/caregiver Yes     Reason For Admission Wheezing; asthma exacerbation     Lives With significant other     Facility Arrived From: son's home in [place]     Do you expect to return to your current living situation? No     Do you have help at home or someone to help you manage your care at home? Yes     Who are your caregiver(s) and  their phone number(s)? bakari Webster     Prior to hospitilization cognitive status: Alert/Oriented     Current cognitive status: Alert/Oriented     Walking or Climbing Stairs Difficulty ambulation difficulty, requires equipment     Mobility Management patient is blind, uses a blind walking cane     Dressing/Bathing Difficulty bathing difficulty, assistance 1 person     Dressing/Bathing Management Needs assistance of one person to get her set up     Equipment Currently Used at Home other (see comments);CPAP   blind walking cane    Readmission within 30 days? No     Patient currently being followed by outpatient case management? No     Do you currently have service(s) that help you manage your care at home? No     Do you take prescription medications? Yes     Do you have prescription coverage? Yes     Coverage [facility]     Do you have any problems affording any of your prescribed medications? No     Is the patient taking medications as prescribed? yes     Who is going to help you get home at discharge? son     How do you get to doctors appointments? family or friend will provide     Are you on dialysis? No     Do you take coumadin? No     Discharge Plan A Home with family     DME Needed Upon Discharge  none     Discharge Barriers Identified Other (see comments)   will have to pay for follow up       Relationship/Environment    Name(s) of Who Lives With Patient Significant other                  Met with patient. Patient is blind and uses a blind walking cane for ambulation.  She also has a CPaP for GEN.  Patient lives in [place] with her significant other. She is here in [place] visiting her son and will discharge to his home.  She is unsure how long she will be here before going back to [place].  She is thinking of moving here to live with her son.  She agreed to make a follow up appointment with Pulmonology.  She also agreed to set up with a PCP.  No other needs identified.    Updated white board with case  manager's name and number. Transitional Care Folder, Discharge Planning Begins on Admission pamphlet, [facility] pamph[facility], Advance Directive information given to patient along with the contact information given.Instructed patient or family to call with any questions or concerns.

## 2022-03-05 NOTE — PLAN OF CARE
Discharge instructions received and reviewed with pt and family at bedside.  Pt voiced understanding and all questions answered to satisfaction.  Stressed importance to making and keeping all follow up appointments.  Medications sent to pt pharmacy and reviewed with pt.  Tele monitor removed and brought to monitor tech.  IV d/c'd with tip intact, pressure dressing applied.  Pt transported to front of hospital via w/c by PCT to be discharged home with home health.

## 2022-03-07 ENCOUNTER — TELEPHONE (OUTPATIENT)
Dept: PULMONOLOGY | Facility: CLINIC | Age: 52
End: 2022-03-07
Payer: MEDICARE

## 2022-03-10 ENCOUNTER — PES CALL (OUTPATIENT)
Dept: ADMINISTRATIVE | Facility: CLINIC | Age: 52
End: 2022-03-10
Payer: MEDICARE

## 2022-03-14 ENCOUNTER — CARE AT HOME (OUTPATIENT)
Dept: HOME HEALTH SERVICES | Facility: CLINIC | Age: 52
End: 2022-03-14
Payer: MEDICARE

## 2022-03-14 VITALS — RESPIRATION RATE: 18 BRPM | OXYGEN SATURATION: 93 % | TEMPERATURE: 98 F | HEART RATE: 83 BPM

## 2022-03-14 DIAGNOSIS — J45.40 MODERATE PERSISTENT ASTHMA WITHOUT COMPLICATION: Primary | ICD-10-CM

## 2022-03-14 DIAGNOSIS — J45.901 MODERATE ASTHMA WITH EXACERBATION, UNSPECIFIED WHETHER PERSISTENT: ICD-10-CM

## 2022-03-14 DIAGNOSIS — H54.8 LEGAL BLINDNESS: ICD-10-CM

## 2022-03-14 DIAGNOSIS — G47.33 OSA ON CPAP: ICD-10-CM

## 2022-03-14 PROCEDURE — 99350 HOME/RES VST EST HIGH MDM 60: CPT | Mod: ,,, | Performed by: NURSE PRACTITIONER

## 2022-03-14 PROCEDURE — 99350 PR HOME VISIT,ESTAB PATIENT,LEVEL IV: ICD-10-PCS | Mod: ,,, | Performed by: NURSE PRACTITIONER

## 2022-03-14 RX ORDER — MONTELUKAST SODIUM 10 MG/1
10 TABLET ORAL NIGHTLY
Qty: 30 TABLET | Refills: 0 | Status: SHIPPED | OUTPATIENT
Start: 2022-03-14 | End: 2022-04-07

## 2022-03-15 NOTE — PROGRESS NOTES
"[facility]  Medical Home Visit    Visit Date: 3/14/2022  Encounter Provider: Iam DAHL  PCP:  Primary Doctor No    Subjective:      Patient ID: Jennifer Mcclain is a 51 y.o. female.    Consult Requested By:  Ashleigh West  Reason for Consult: Establish Care    Jennifer is being seen at home due to physical debility that presents a taxing effort to leave the home, to mitigate high risk of hospital readmission and/or due to the limited availability of reliable or safe options for transportation to the point of access to the provider. Prior to treatment on this visit the chart was reviewed and patient verbal consent was obtained.      Chief Complaint: Transitional Care      Patient is being seen today to establish care. Patient has rupinder multiple visits to the ER for asthma exacerbation. Patient does not have a PCP in [place], She is constantly moving around between homes with family members here in [place], in [place] and in [place]. Upon arrival patient is seated on the sofa, AAOx3 in no acute distress. Patient is legally blind, she is also obese. She reports that she saw Dr Mota before but it was "a lomg time ago".  VSS and patient reports that she is compliant with medications.    Review of Systems   Constitutional: Positive for fatigue. Negative for appetite change, chills and fever.   HENT: Negative.    Eyes: Positive for visual disturbance (patient is legally blind).   Respiratory: Positive for shortness of breath and wheezing.    Cardiovascular: Negative for chest pain, palpitations and leg swelling.   Gastrointestinal: Negative for abdominal pain, constipation and diarrhea.   Endocrine: Negative.    Genitourinary: Positive for difficulty urinating.   Musculoskeletal: Negative.    Skin: Negative.    Allergic/Immunologic: Negative.    Neurological: Negative for dizziness, syncope and light-headedness.   Hematological: Negative.    Psychiatric/Behavioral: Negative for agitation, " hallucinations and sleep disturbance. The patient is not nervous/anxious.        Assessments:  · Environmental: Patient lives in a townhouse with family. There are no steps at the entrance, lighting is adequate and temperature is comfortable  · Functional Status: Patient is independent with ADLs, patient is legally blind  · Safety: Fall Precautions  · Nutritional: Adequate food in the home  · Home Health/DME/Supplies: No Home Health, Patient has stick for guidance with ambulation    Objective:   Physical Exam  Constitutional:       General: She is not in acute distress.     Appearance: She is obese.   HENT:      Head: Normocephalic.      Nose: Nose normal.      Mouth/Throat:      Mouth: Mucous membranes are moist.   Eyes:      Comments: Patient is blind in both eyes    Cardiovascular:      Rate and Rhythm: Normal rate and regular rhythm.      Pulses: Normal pulses.      Heart sounds: Normal heart sounds.   Pulmonary:      Effort: Pulmonary effort is normal. No respiratory distress.      Comments: Diminished breath sounds throughout  Abdominal:      General: Bowel sounds are normal.      Palpations: Abdomen is soft.   Musculoskeletal:         General: Normal range of motion.      Cervical back: Normal range of motion.   Skin:     General: Skin is warm and dry.   Neurological:      General: No focal deficit present.      Mental Status: She is alert.   Psychiatric:         Mood and Affect: Mood normal.         Behavior: Behavior normal.         Thought Content: Thought content normal.         Vitals:    03/14/22 1920   Pulse: 83   Resp: 18   Temp: 98.3 °F (36.8 °C)   SpO2: (!) 93%   PainSc: 0-No pain     There is no height or weight on file to calculate BMI.    Assessment:     1. Moderate persistent asthma without complication    2. Moderate asthma with exacerbation, unspecified whether persistent    3. GEN on CPAP    4. 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    Diagnoses and all orders for this visit:    Moderate persistent asthma without complication  -     montelukast (SINGULAIR) 10 mg tablet; Take 1 tablet (10 mg total) by mouth every evening.    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   - Follow all instructions, recommendations as discussed.  - Maintain Safety Precautions at all times.  - Attend all medical appointments as scheduled.  - For worsening symptoms: call Primary Care Physician or Nurse Practitioner.  - For emergencies, call 911 or immediately report to the nearest emergency room.    Were controlled substances prescribed?  No    Follow Up Appointments:   No future appointments.    Signature:
